# Patient Record
Sex: MALE | Race: WHITE | NOT HISPANIC OR LATINO | Employment: OTHER | URBAN - METROPOLITAN AREA
[De-identification: names, ages, dates, MRNs, and addresses within clinical notes are randomized per-mention and may not be internally consistent; named-entity substitution may affect disease eponyms.]

---

## 2017-03-31 ENCOUNTER — HISTORICAL (OUTPATIENT)
Dept: ADMINISTRATIVE | Facility: HOSPITAL | Age: 77
End: 2017-03-31

## 2017-06-07 ENCOUNTER — HISTORICAL (OUTPATIENT)
Dept: ADMINISTRATIVE | Facility: HOSPITAL | Age: 77
End: 2017-06-07

## 2017-06-07 LAB
ALBUMIN SERPL-MCNC: 3.3 GM/DL (ref 3.4–5)
APPEARANCE, UA: CLEAR
BACTERIA SPEC CULT: ABNORMAL /HPF
BILIRUB UR QL STRIP: NEGATIVE
BNP BLD-MCNC: 210 PG/ML (ref 0–125)
BUN SERPL-MCNC: 29 MG/DL (ref 7–18)
CALCIUM SERPL-MCNC: 9.3 MG/DL (ref 8.5–10.1)
CHLORIDE SERPL-SCNC: 107 MMOL/L (ref 98–107)
CO2 SERPL-SCNC: 24 MMOL/L (ref 21–32)
COLOR UR: YELLOW
CREAT SERPL-MCNC: 1.78 MG/DL (ref 0.7–1.3)
CREAT UR-MCNC: 152 MG/DL
ERYTHROCYTE [DISTWIDTH] IN BLOOD BY AUTOMATED COUNT: 13.7 % (ref 11.5–17)
GLUCOSE (UA): ABNORMAL
GLUCOSE SERPL-MCNC: 188 MG/DL (ref 74–106)
HCT VFR BLD AUTO: 38.2 % (ref 42–52)
HGB BLD-MCNC: 12.3 GM/DL (ref 14–18)
HGB UR QL STRIP: ABNORMAL
KETONES UR QL STRIP: NEGATIVE
LEUKOCYTE ESTERASE UR QL STRIP: NEGATIVE
MAGNESIUM SERPL-MCNC: 2.5 MG/DL (ref 1.8–2.4)
MCH RBC QN AUTO: 29.6 PG (ref 27–31)
MCHC RBC AUTO-ENTMCNC: 32.2 GM/DL (ref 33–36)
MCV RBC AUTO: 91.8 FL (ref 80–94)
NITRITE UR QL STRIP: NEGATIVE
PH UR STRIP: 5.5 [PH] (ref 5–9)
PHOSPHATE SERPL-MCNC: 3.2 MG/DL (ref 2.5–4.9)
PLATELET # BLD AUTO: 195 X10(3)/MCL (ref 130–400)
PMV BLD AUTO: 11 FL (ref 9.4–12.4)
POTASSIUM SERPL-SCNC: 4.5 MMOL/L (ref 3.5–5.1)
PROT UR QL STRIP: ABNORMAL
PROT UR STRIP-MCNC: 117.5 MG/DL
PROT/CREAT UR-RTO: 0.8 MG/DL
PTH-INTACT SERPL-MCNC: 31.6 PG/DL (ref 14–72)
RBC # BLD AUTO: 4.16 X10(6)/MCL (ref 4.7–6.1)
RBC #/AREA URNS HPF: ABNORMAL /[HPF]
SODIUM SERPL-SCNC: 144 MMOL/L (ref 136–145)
SP GR UR STRIP: 1.02 (ref 1–1.03)
SQUAMOUS EPITHELIAL, UA: ABNORMAL
URATE SERPL-MCNC: 4.3 MG/DL (ref 2.6–7.2)
UROBILINOGEN UR STRIP-ACNC: 0.2
WBC # SPEC AUTO: 7.9 X10(3)/MCL (ref 4.5–11.5)
WBC #/AREA URNS HPF: ABNORMAL /HPF

## 2017-06-29 ENCOUNTER — HISTORICAL (OUTPATIENT)
Dept: LAB | Facility: HOSPITAL | Age: 77
End: 2017-06-29

## 2017-06-29 LAB
ALBUMIN SERPL-MCNC: 3.1 GM/DL (ref 3.4–5)
ALBUMIN/GLOB SERPL: 0.9 RATIO (ref 1.1–2)
ALP SERPL-CCNC: 120 UNIT/L (ref 50–136)
ALT SERPL-CCNC: 27 UNIT/L (ref 12–78)
AST SERPL-CCNC: 13 UNIT/L (ref 15–37)
BILIRUB SERPL-MCNC: 0.3 MG/DL (ref 0.2–1)
BILIRUBIN DIRECT+TOT PNL SERPL-MCNC: 0.1 MG/DL (ref 0–0.5)
BILIRUBIN DIRECT+TOT PNL SERPL-MCNC: 0.2 MG/DL (ref 0–0.8)
BUN SERPL-MCNC: 34 MG/DL (ref 7–18)
CALCIUM SERPL-MCNC: 9 MG/DL (ref 8.5–10.1)
CHLORIDE SERPL-SCNC: 106 MMOL/L (ref 98–107)
CHOLEST SERPL-MCNC: 175 MG/DL (ref 0–200)
CHOLEST/HDLC SERPL: 5.1 {RATIO} (ref 0–5)
CO2 SERPL-SCNC: 26 MMOL/L (ref 21–32)
CREAT SERPL-MCNC: 1.96 MG/DL (ref 0.7–1.3)
EST. AVERAGE GLUCOSE BLD GHB EST-MCNC: 194 MG/DL
GLOBULIN SER-MCNC: 3.6 GM/DL (ref 2.4–3.5)
GLUCOSE SERPL-MCNC: 214 MG/DL (ref 74–106)
HBA1C MFR BLD: 8.4 % (ref 4.2–6.3)
HDLC SERPL-MCNC: 34 MG/DL (ref 35–60)
LDLC SERPL CALC-MCNC: 72 MG/DL (ref 0–129)
POTASSIUM SERPL-SCNC: 4.8 MMOL/L (ref 3.5–5.1)
PROT SERPL-MCNC: 6.7 GM/DL (ref 6.4–8.2)
SODIUM SERPL-SCNC: 141 MMOL/L (ref 136–145)
TRIGL SERPL-MCNC: 347 MG/DL (ref 30–150)
VLDLC SERPL CALC-MCNC: 69 MG/DL

## 2017-11-08 ENCOUNTER — HISTORICAL (OUTPATIENT)
Dept: ADMINISTRATIVE | Facility: HOSPITAL | Age: 77
End: 2017-11-08

## 2017-11-08 LAB
EST. AVERAGE GLUCOSE BLD GHB EST-MCNC: 157 MG/DL
HBA1C MFR BLD: 7.1 % (ref 4.2–6.3)

## 2017-11-30 ENCOUNTER — HISTORICAL (OUTPATIENT)
Dept: ADMINISTRATIVE | Facility: HOSPITAL | Age: 77
End: 2017-11-30

## 2017-11-30 LAB
ALBUMIN SERPL-MCNC: 3.4 GM/DL (ref 3.4–5)
ALBUMIN/GLOB SERPL: 1 RATIO (ref 1.1–2)
ALP SERPL-CCNC: 83 UNIT/L (ref 50–136)
ALT SERPL-CCNC: 42 UNIT/L (ref 12–78)
AST SERPL-CCNC: 16 UNIT/L (ref 15–37)
BILIRUB SERPL-MCNC: 0.5 MG/DL (ref 0.2–1)
BILIRUBIN DIRECT+TOT PNL SERPL-MCNC: 0.1 MG/DL (ref 0–0.5)
BILIRUBIN DIRECT+TOT PNL SERPL-MCNC: 0.4 MG/DL (ref 0–0.8)
BUN SERPL-MCNC: 28 MG/DL (ref 7–18)
CALCIUM SERPL-MCNC: 9.1 MG/DL (ref 8.5–10.1)
CHLORIDE SERPL-SCNC: 108 MMOL/L (ref 98–107)
CHOLEST SERPL-MCNC: 165 MG/DL (ref 0–200)
CHOLEST/HDLC SERPL: 4.2 {RATIO} (ref 0–5)
CO2 SERPL-SCNC: 26 MMOL/L (ref 21–32)
CREAT SERPL-MCNC: 1.75 MG/DL (ref 0.7–1.3)
GLOBULIN SER-MCNC: 3.4 GM/DL (ref 2.4–3.5)
GLUCOSE SERPL-MCNC: 156 MG/DL (ref 74–106)
HDLC SERPL-MCNC: 39 MG/DL (ref 35–60)
LDLC SERPL CALC-MCNC: 84 MG/DL (ref 0–129)
POTASSIUM SERPL-SCNC: 4.7 MMOL/L (ref 3.5–5.1)
PROT SERPL-MCNC: 6.8 GM/DL (ref 6.4–8.2)
SODIUM SERPL-SCNC: 142 MMOL/L (ref 136–145)
TRIGL SERPL-MCNC: 209 MG/DL (ref 30–150)
VLDLC SERPL CALC-MCNC: 42 MG/DL

## 2018-05-11 ENCOUNTER — HISTORICAL (OUTPATIENT)
Dept: ADMINISTRATIVE | Facility: HOSPITAL | Age: 78
End: 2018-05-11

## 2018-05-11 LAB
ABS NEUT (OLG): 4.92 X10(3)/MCL (ref 2.1–9.2)
ALBUMIN SERPL-MCNC: 3.1 GM/DL (ref 3.4–5)
ALBUMIN/GLOB SERPL: 0.9 {RATIO}
ALP SERPL-CCNC: 109 UNIT/L (ref 50–136)
ALT SERPL-CCNC: 30 UNIT/L (ref 12–78)
AST SERPL-CCNC: 10 UNIT/L (ref 15–37)
BASOPHILS # BLD AUTO: 0 X10(3)/MCL (ref 0–0.2)
BASOPHILS NFR BLD AUTO: 1 %
BILIRUB SERPL-MCNC: 0.3 MG/DL (ref 0.2–1)
BILIRUBIN DIRECT+TOT PNL SERPL-MCNC: 0.1 MG/DL (ref 0–0.2)
BILIRUBIN DIRECT+TOT PNL SERPL-MCNC: 0.2 MG/DL (ref 0–0.8)
BUN SERPL-MCNC: 29 MG/DL (ref 7–18)
CALCIUM SERPL-MCNC: 8.9 MG/DL (ref 8.5–10.1)
CHLORIDE SERPL-SCNC: 109 MMOL/L (ref 98–107)
CHOLEST SERPL-MCNC: 154 MG/DL (ref 0–200)
CHOLEST/HDLC SERPL: 3.4 {RATIO} (ref 0–5)
CO2 SERPL-SCNC: 26 MMOL/L (ref 21–32)
CREAT SERPL-MCNC: 1.6 MG/DL (ref 0.7–1.3)
EOSINOPHIL # BLD AUTO: 0.2 X10(3)/MCL (ref 0–0.9)
EOSINOPHIL NFR BLD AUTO: 2 %
ERYTHROCYTE [DISTWIDTH] IN BLOOD BY AUTOMATED COUNT: 14 % (ref 11.5–17)
EST. AVERAGE GLUCOSE BLD GHB EST-MCNC: 169 MG/DL
GLOBULIN SER-MCNC: 3.5 GM/DL (ref 2.4–3.5)
GLUCOSE SERPL-MCNC: 154 MG/DL (ref 74–106)
HBA1C MFR BLD: 7.5 % (ref 4.2–6.3)
HCT VFR BLD AUTO: 39.2 % (ref 42–52)
HDLC SERPL-MCNC: 45 MG/DL (ref 35–60)
HGB BLD-MCNC: 12.8 GM/DL (ref 14–18)
LDLC SERPL CALC-MCNC: 94 MG/DL (ref 0–129)
LYMPHOCYTES # BLD AUTO: 1.4 X10(3)/MCL (ref 0.6–4.6)
LYMPHOCYTES NFR BLD AUTO: 19 %
MCH RBC QN AUTO: 30.7 PG (ref 27–31)
MCHC RBC AUTO-ENTMCNC: 32.7 GM/DL (ref 33–36)
MCV RBC AUTO: 94 FL (ref 80–94)
MONOCYTES # BLD AUTO: 0.7 X10(3)/MCL (ref 0.1–1.3)
MONOCYTES NFR BLD AUTO: 10 %
NEUTROPHILS # BLD AUTO: 4.92 X10(3)/MCL (ref 1.4–7.9)
NEUTROPHILS NFR BLD AUTO: 67 %
PLATELET # BLD AUTO: 198 X10(3)/MCL (ref 130–400)
PMV BLD AUTO: 10.1 FL (ref 9.4–12.4)
POTASSIUM SERPL-SCNC: 4.2 MMOL/L (ref 3.5–5.1)
PROT SERPL-MCNC: 6.6 GM/DL (ref 6.4–8.2)
RBC # BLD AUTO: 4.17 X10(6)/MCL (ref 4.7–6.1)
SODIUM SERPL-SCNC: 143 MMOL/L (ref 136–145)
TRIGL SERPL-MCNC: 76 MG/DL (ref 30–150)
VLDLC SERPL CALC-MCNC: 15 MG/DL
WBC # SPEC AUTO: 7.3 X10(3)/MCL (ref 4.5–11.5)

## 2018-06-07 ENCOUNTER — HISTORICAL (OUTPATIENT)
Dept: ADMINISTRATIVE | Facility: HOSPITAL | Age: 78
End: 2018-06-07

## 2018-06-07 LAB
ALBUMIN SERPL-MCNC: 3.3 GM/DL (ref 3.4–5)
ALBUMIN SERPL-MCNC: 3.3 GM/DL (ref 3.4–5)
ALBUMIN/GLOB SERPL: 0.9 {RATIO}
ALP SERPL-CCNC: 89 UNIT/L (ref 50–136)
ALT SERPL-CCNC: 35 UNIT/L (ref 12–78)
APPEARANCE, UA: CLEAR
AST SERPL-CCNC: 13 UNIT/L (ref 15–37)
BACTERIA SPEC CULT: ABNORMAL /HPF
BILIRUB SERPL-MCNC: 0.4 MG/DL (ref 0.2–1)
BILIRUB UR QL STRIP: NEGATIVE
BILIRUBIN DIRECT+TOT PNL SERPL-MCNC: 0.1 MG/DL (ref 0–0.2)
BILIRUBIN DIRECT+TOT PNL SERPL-MCNC: 0.3 MG/DL (ref 0–0.8)
BUN SERPL-MCNC: 29 MG/DL (ref 7–18)
BUN SERPL-MCNC: 29 MG/DL (ref 7–18)
CALCIUM SERPL-MCNC: 8.8 MG/DL (ref 8.5–10.1)
CALCIUM SERPL-MCNC: 8.9 MG/DL (ref 8.5–10.1)
CHLORIDE SERPL-SCNC: 106 MMOL/L (ref 98–107)
CHLORIDE SERPL-SCNC: 106 MMOL/L (ref 98–107)
CHOLEST SERPL-MCNC: 178 MG/DL (ref 0–200)
CHOLEST/HDLC SERPL: 3.8 {RATIO} (ref 0–5)
CO2 SERPL-SCNC: 24 MMOL/L (ref 21–32)
CO2 SERPL-SCNC: 25 MMOL/L (ref 21–32)
COLOR UR: YELLOW
CREAT SERPL-MCNC: 1.64 MG/DL (ref 0.7–1.3)
CREAT SERPL-MCNC: 1.68 MG/DL (ref 0.7–1.3)
CREAT UR-MCNC: 86 MG/DL
ERYTHROCYTE [DISTWIDTH] IN BLOOD BY AUTOMATED COUNT: 13.5 % (ref 11.5–17)
EST. AVERAGE GLUCOSE BLD GHB EST-MCNC: 169 MG/DL
GLOBULIN SER-MCNC: 3.6 GM/DL (ref 2.4–3.5)
GLUCOSE (UA): ABNORMAL
GLUCOSE SERPL-MCNC: 180 MG/DL (ref 74–106)
GLUCOSE SERPL-MCNC: 180 MG/DL (ref 74–106)
HBA1C MFR BLD: 7.5 % (ref 4.2–6.3)
HCT VFR BLD AUTO: 41.8 % (ref 42–52)
HDLC SERPL-MCNC: 47 MG/DL (ref 35–60)
HGB BLD-MCNC: 13.3 GM/DL (ref 14–18)
HGB UR QL STRIP: NEGATIVE
KETONES UR QL STRIP: NEGATIVE
LDLC SERPL CALC-MCNC: 87 MG/DL (ref 0–129)
LEUKOCYTE ESTERASE UR QL STRIP: NEGATIVE
MCH RBC QN AUTO: 29.4 PG (ref 27–31)
MCHC RBC AUTO-ENTMCNC: 31.8 GM/DL (ref 33–36)
MCV RBC AUTO: 92.3 FL (ref 80–94)
NITRITE UR QL STRIP: NEGATIVE
PH UR STRIP: 5 [PH] (ref 5–9)
PHOSPHATE SERPL-MCNC: 3.1 MG/DL (ref 2.5–4.9)
PLATELET # BLD AUTO: 203 X10(3)/MCL (ref 130–400)
PMV BLD AUTO: 9.8 FL (ref 9.4–12.4)
POTASSIUM SERPL-SCNC: 4.8 MMOL/L (ref 3.5–5.1)
POTASSIUM SERPL-SCNC: 4.9 MMOL/L (ref 3.5–5.1)
PROT SERPL-MCNC: 6.9 GM/DL (ref 6.4–8.2)
PROT UR QL STRIP: ABNORMAL
PROT UR STRIP-MCNC: 67 MG/DL
PTH-INTACT SERPL-MCNC: 74.4 PG/ML (ref 18.4–80.1)
RBC # BLD AUTO: 4.53 X10(6)/MCL (ref 4.7–6.1)
RBC #/AREA URNS HPF: ABNORMAL /[HPF]
SODIUM SERPL-SCNC: 139 MMOL/L (ref 136–145)
SODIUM SERPL-SCNC: 140 MMOL/L (ref 136–145)
SP GR UR STRIP: 1.01 (ref 1–1.03)
SQUAMOUS EPITHELIAL, UA: ABNORMAL
TRIGL SERPL-MCNC: 222 MG/DL (ref 30–150)
TSH SERPL-ACNC: 1.39 MIU/L (ref 0.36–3.74)
URATE SERPL-MCNC: 4 MG/DL (ref 2.6–7.2)
UROBILINOGEN UR STRIP-ACNC: 0.2
VLDLC SERPL CALC-MCNC: 44 MG/DL
WBC # SPEC AUTO: 6 X10(3)/MCL (ref 4.5–11.5)
WBC #/AREA URNS HPF: ABNORMAL /HPF

## 2018-09-18 ENCOUNTER — HISTORICAL (OUTPATIENT)
Dept: ADMINISTRATIVE | Facility: HOSPITAL | Age: 78
End: 2018-09-18

## 2018-09-18 LAB
EST. AVERAGE GLUCOSE BLD GHB EST-MCNC: 220 MG/DL
HBA1C MFR BLD: 9.3 % (ref 4.2–6.3)

## 2018-12-06 ENCOUNTER — HISTORICAL (OUTPATIENT)
Dept: ADMINISTRATIVE | Facility: HOSPITAL | Age: 78
End: 2018-12-06

## 2018-12-06 LAB
BUN SERPL-MCNC: 32 MG/DL (ref 7–18)
CALCIUM SERPL-MCNC: 9.3 MG/DL (ref 8.5–10.1)
CHLORIDE SERPL-SCNC: 107 MMOL/L (ref 98–107)
CO2 SERPL-SCNC: 25 MMOL/L (ref 21–32)
CREAT SERPL-MCNC: 1.71 MG/DL (ref 0.7–1.3)
CREAT/UREA NIT SERPL: 18.7
EST. AVERAGE GLUCOSE BLD GHB EST-MCNC: 223 MG/DL
GLUCOSE SERPL-MCNC: 197 MG/DL (ref 74–106)
HBA1C MFR BLD: 9.4 % (ref 4.2–6.3)
POTASSIUM SERPL-SCNC: 4.4 MMOL/L (ref 3.5–5.1)
SODIUM SERPL-SCNC: 140 MMOL/L (ref 136–145)

## 2019-03-08 ENCOUNTER — HISTORICAL (OUTPATIENT)
Dept: ADMINISTRATIVE | Facility: HOSPITAL | Age: 79
End: 2019-03-08

## 2019-03-08 LAB
EST. AVERAGE GLUCOSE BLD GHB EST-MCNC: 169 MG/DL
HBA1C MFR BLD: 7.5 % (ref 4.2–6.3)

## 2019-03-12 ENCOUNTER — HISTORICAL (OUTPATIENT)
Dept: LAB | Facility: HOSPITAL | Age: 79
End: 2019-03-12

## 2019-03-12 LAB
CREAT UR-MCNC: 267 MG/DL
MICROALBUMIN UR-MCNC: 59.3 MG/DL
MICROALBUMIN/CREAT RATIO PNL UR: 222.1 MG/GM CR (ref 0–30)

## 2019-04-05 ENCOUNTER — HISTORICAL (OUTPATIENT)
Dept: ADMINISTRATIVE | Facility: HOSPITAL | Age: 79
End: 2019-04-05

## 2019-04-05 LAB
ALBUMIN SERPL-MCNC: 3.4 GM/DL (ref 3.4–5)
ALBUMIN/GLOB SERPL: 1 RATIO (ref 1.1–2)
ALP SERPL-CCNC: 85 UNIT/L (ref 50–136)
ALT SERPL-CCNC: 33 UNIT/L (ref 12–78)
AST SERPL-CCNC: 16 UNIT/L (ref 15–37)
BILIRUB SERPL-MCNC: 0.4 MG/DL (ref 0.2–1)
BILIRUBIN DIRECT+TOT PNL SERPL-MCNC: 0.1 MG/DL (ref 0–0.5)
BILIRUBIN DIRECT+TOT PNL SERPL-MCNC: 0.3 MG/DL (ref 0–0.8)
BUN SERPL-MCNC: 41 MG/DL (ref 7–18)
CALCIUM SERPL-MCNC: 9.3 MG/DL (ref 8.5–10.1)
CHLORIDE SERPL-SCNC: 111 MMOL/L (ref 98–107)
CHOLEST SERPL-MCNC: 137 MG/DL (ref 0–200)
CHOLEST/HDLC SERPL: 3.7 {RATIO} (ref 0–5)
CO2 SERPL-SCNC: 23 MMOL/L (ref 21–32)
CREAT SERPL-MCNC: 2.2 MG/DL (ref 0.7–1.3)
GLOBULIN SER-MCNC: 3.4 GM/DL (ref 2.4–3.5)
GLUCOSE SERPL-MCNC: 131 MG/DL (ref 74–106)
HDLC SERPL-MCNC: 37 MG/DL (ref 35–60)
LDLC SERPL CALC-MCNC: 75 MG/DL (ref 0–129)
POTASSIUM SERPL-SCNC: 4.5 MMOL/L (ref 3.5–5.1)
PROT SERPL-MCNC: 6.8 GM/DL (ref 6.4–8.2)
SODIUM SERPL-SCNC: 141 MMOL/L (ref 136–145)
TRIGL SERPL-MCNC: 127 MG/DL (ref 30–150)
VLDLC SERPL CALC-MCNC: 25 MG/DL

## 2019-05-15 ENCOUNTER — HISTORICAL (OUTPATIENT)
Dept: ADMINISTRATIVE | Facility: HOSPITAL | Age: 79
End: 2019-05-15

## 2019-05-15 LAB
ALBUMIN SERPL-MCNC: 3.1 GM/DL (ref 3.4–5)
BUN SERPL-MCNC: 27 MG/DL (ref 7–18)
CALCIUM SERPL-MCNC: 9 MG/DL (ref 8.5–10.1)
CHLORIDE SERPL-SCNC: 107 MMOL/L (ref 98–107)
CO2 SERPL-SCNC: 26 MMOL/L (ref 21–32)
CREAT SERPL-MCNC: 1.92 MG/DL (ref 0.7–1.3)
GLUCOSE SERPL-MCNC: 244 MG/DL (ref 74–106)
PHOSPHATE SERPL-MCNC: 3.5 MG/DL (ref 2.5–4.9)
POTASSIUM SERPL-SCNC: 4.4 MMOL/L (ref 3.5–5.1)
SODIUM SERPL-SCNC: 139 MMOL/L (ref 136–145)

## 2019-06-25 ENCOUNTER — HISTORICAL (OUTPATIENT)
Dept: LAB | Facility: HOSPITAL | Age: 79
End: 2019-06-25

## 2019-06-25 LAB
BUN SERPL-MCNC: 30 MG/DL (ref 7–18)
CALCIUM SERPL-MCNC: 9.4 MG/DL (ref 8.5–10.1)
CHLORIDE SERPL-SCNC: 112 MMOL/L (ref 98–107)
CO2 SERPL-SCNC: 25 MMOL/L (ref 21–32)
CREAT SERPL-MCNC: 1.97 MG/DL (ref 0.7–1.3)
CREAT/UREA NIT SERPL: 15
GLUCOSE SERPL-MCNC: 112 MG/DL (ref 74–106)
POTASSIUM SERPL-SCNC: 4.5 MMOL/L (ref 3.5–5.1)
SODIUM SERPL-SCNC: 144 MMOL/L (ref 136–145)

## 2019-08-10 ENCOUNTER — HISTORICAL (OUTPATIENT)
Dept: LAB | Facility: HOSPITAL | Age: 79
End: 2019-08-10

## 2019-08-10 LAB
CHOLEST SERPL-MCNC: 130 MG/DL (ref 0–199)
CHOLEST/HDLC SERPL: 3 MG/DL (ref 0–8)
HDLC SERPL-MCNC: 42 MG/DL
LDLC SERPL CALC-MCNC: 65 MG/DL (ref 0–129)
TRIGL SERPL-MCNC: 114 MG/DL (ref 0–149)
VLDLC SERPL CALC-MCNC: 23 MG/DL

## 2019-08-14 ENCOUNTER — HISTORICAL (OUTPATIENT)
Dept: LAB | Facility: HOSPITAL | Age: 79
End: 2019-08-14

## 2019-08-14 LAB
ABS NEUT (OLG): 4.47 X10(3)/MCL (ref 2.1–9.2)
BASOPHILS # BLD AUTO: 0.1 X10(3)/MCL (ref 0–0.2)
BASOPHILS NFR BLD AUTO: 1 %
EOSINOPHIL # BLD AUTO: 0.2 X10(3)/MCL (ref 0–0.9)
EOSINOPHIL NFR BLD AUTO: 3 %
ERYTHROCYTE [DISTWIDTH] IN BLOOD BY AUTOMATED COUNT: 14.4 % (ref 11.5–17)
EST. AVERAGE GLUCOSE BLD GHB EST-MCNC: 134 MG/DL
HBA1C MFR BLD: 6.3 % (ref 4.2–6.3)
HCT VFR BLD AUTO: 41.8 % (ref 42–52)
HGB BLD-MCNC: 13.2 GM/DL (ref 14–18)
LYMPHOCYTES # BLD AUTO: 1.6 X10(3)/MCL (ref 0.6–4.6)
LYMPHOCYTES NFR BLD AUTO: 22 %
MCH RBC QN AUTO: 29.8 PG (ref 27–31)
MCHC RBC AUTO-ENTMCNC: 31.6 GM/DL (ref 33–36)
MCV RBC AUTO: 94.4 FL (ref 80–94)
MONOCYTES # BLD AUTO: 0.7 X10(3)/MCL (ref 0.1–1.3)
MONOCYTES NFR BLD AUTO: 10 %
NEUTROPHILS # BLD AUTO: 4.47 X10(3)/MCL (ref 2.1–9.2)
NEUTROPHILS NFR BLD AUTO: 63 %
PLATELET # BLD AUTO: 213 X10(3)/MCL (ref 130–400)
PMV BLD AUTO: 11 FL (ref 9.4–12.4)
RBC # BLD AUTO: 4.43 X10(6)/MCL (ref 4.7–6.1)
WBC # SPEC AUTO: 7.1 X10(3)/MCL (ref 4.5–11.5)

## 2019-11-04 LAB
LEFT EYE DM RETINOPATHY: NEGATIVE
RIGHT EYE DM RETINOPATHY: NEGATIVE

## 2019-12-17 ENCOUNTER — HISTORICAL (OUTPATIENT)
Dept: ADMINISTRATIVE | Facility: HOSPITAL | Age: 79
End: 2019-12-17

## 2019-12-17 LAB
ALBUMIN SERPL-MCNC: 3.2 GM/DL (ref 3.4–5)
ALBUMIN/GLOB SERPL: 0.9 RATIO (ref 1.1–2)
ALP SERPL-CCNC: 83 UNIT/L (ref 50–136)
ALT SERPL-CCNC: 51 UNIT/L (ref 12–78)
AST SERPL-CCNC: 19 UNIT/L (ref 15–37)
BILIRUB SERPL-MCNC: 0.5 MG/DL (ref 0.2–1)
BILIRUBIN DIRECT+TOT PNL SERPL-MCNC: 0.1 MG/DL (ref 0–0.5)
BILIRUBIN DIRECT+TOT PNL SERPL-MCNC: 0.4 MG/DL (ref 0–0.8)
BUN SERPL-MCNC: 24 MG/DL (ref 7–18)
CALCIUM SERPL-MCNC: 9.6 MG/DL (ref 8.5–10.1)
CHLORIDE SERPL-SCNC: 107 MMOL/L (ref 98–107)
CO2 SERPL-SCNC: 28 MMOL/L (ref 21–32)
CREAT SERPL-MCNC: 1.69 MG/DL (ref 0.7–1.3)
EST. AVERAGE GLUCOSE BLD GHB EST-MCNC: 166 MG/DL
GLOBULIN SER-MCNC: 3.5 GM/DL (ref 2.4–3.5)
GLUCOSE SERPL-MCNC: 122 MG/DL (ref 74–106)
HBA1C MFR BLD: 7.4 % (ref 4.2–6.3)
POTASSIUM SERPL-SCNC: 4.8 MMOL/L (ref 3.5–5.1)
PROT SERPL-MCNC: 6.7 GM/DL (ref 6.4–8.2)
SODIUM SERPL-SCNC: 141 MMOL/L (ref 136–145)

## 2020-06-05 ENCOUNTER — HISTORICAL (OUTPATIENT)
Dept: LAB | Facility: HOSPITAL | Age: 80
End: 2020-06-05

## 2020-06-05 LAB
ABS NEUT (OLG): 5.92 X10(3)/MCL (ref 2.1–9.2)
ALBUMIN SERPL-MCNC: 3.6 GM/DL (ref 3.4–4.8)
ALBUMIN/GLOB SERPL: 1.1 RATIO (ref 1.1–2)
ALP SERPL-CCNC: 73 UNIT/L (ref 40–150)
ALT SERPL-CCNC: 22 UNIT/L (ref 0–55)
APPEARANCE, UA: ABNORMAL
AST SERPL-CCNC: 13 UNIT/L (ref 5–34)
BACTERIA SPEC CULT: ABNORMAL /HPF
BILIRUB SERPL-MCNC: 0.8 MG/DL (ref 0.2–1.2)
BILIRUB UR QL STRIP: NEGATIVE
BILIRUBIN DIRECT+TOT PNL SERPL-MCNC: 0.3 MG/DL (ref 0–0.5)
BILIRUBIN DIRECT+TOT PNL SERPL-MCNC: 0.5 MG/DL (ref 0–0.8)
BUN SERPL-MCNC: 30 MG/DL (ref 8.4–25.7)
CALCIUM SERPL-MCNC: 9.9 MG/DL (ref 8.8–10)
CHLORIDE SERPL-SCNC: 108 MMOL/L (ref 98–107)
CHOLEST SERPL-MCNC: 136 MG/DL
CHOLEST/HDLC SERPL: 4 {RATIO} (ref 0–5)
CO2 SERPL-SCNC: 22 MMOL/L (ref 23–31)
COLOR UR: YELLOW
CREAT SERPL-MCNC: 2.07 MG/DL (ref 0.72–1.25)
CREAT UR-MCNC: 386.6 MG/DL (ref 58–161)
ERYTHROCYTE [DISTWIDTH] IN BLOOD BY AUTOMATED COUNT: 14.5 % (ref 11.5–17)
EST. AVERAGE GLUCOSE BLD GHB EST-MCNC: 137 MG/DL
GLOBULIN SER-MCNC: 3.2 GM/DL (ref 2.4–3.5)
GLUCOSE (UA): NEGATIVE
GLUCOSE SERPL-MCNC: 118 MG/DL (ref 82–115)
HBA1C MFR BLD: 6.4 %
HCT VFR BLD AUTO: 39.2 % (ref 42–52)
HDLC SERPL-MCNC: 34 MG/DL (ref 40–60)
HGB BLD-MCNC: 12.8 GM/DL (ref 14–18)
HGB UR QL STRIP: NEGATIVE
HYALINE CASTS #/AREA URNS LPF: ABNORMAL /LPF
KETONES UR QL STRIP: ABNORMAL
LDLC SERPL CALC-MCNC: 76 MG/DL (ref 50–140)
LEUKOCYTE ESTERASE UR QL STRIP: NEGATIVE
MAGNESIUM SERPL-MCNC: 1.77 MG/DL (ref 1.6–2.6)
MCH RBC QN AUTO: 30.1 PG (ref 27–31)
MCHC RBC AUTO-ENTMCNC: 32.7 GM/DL (ref 33–36)
MCV RBC AUTO: 92.2 FL (ref 80–94)
NITRITE UR QL STRIP: NEGATIVE
NRBC BLD AUTO-RTO: 0 % (ref 0–0.2)
PH UR STRIP: 5 [PH] (ref 5–7)
PHOSPHATE SERPL-MCNC: 3.3 MG/DL (ref 2.3–4.7)
PLATELET # BLD AUTO: 220 X10(3)/MCL (ref 130–400)
PMV BLD AUTO: 10.4 FL (ref 7.4–10.4)
POTASSIUM SERPL-SCNC: 3.8 MMOL/L (ref 3.5–5.1)
PROT SERPL-MCNC: 6.8 GM/DL (ref 5.8–7.6)
PROT UR QL STRIP: ABNORMAL
PROT UR STRIP-MCNC: 173 MG/DL
PTH-INTACT SERPL-MCNC: 89.7 PG/ML (ref 8.7–77.1)
RBC # BLD AUTO: 4.25 X10(6)/MCL (ref 4.7–6.1)
RBC #/AREA URNS HPF: 0 /[HPF]
SODIUM SERPL-SCNC: 140 MMOL/L (ref 136–145)
SP GR UR STRIP: >=1.03 (ref 1–1.03)
SQUAMOUS EPITHELIAL, UA: ABNORMAL /LPF
TRIGL SERPL-MCNC: 132 MG/DL (ref 0–150)
TSH SERPL-ACNC: 1.81 UIU/ML (ref 0.35–4.94)
URATE SERPL-MCNC: 5 MG/DL (ref 3.5–7.2)
UROBILINOGEN UR STRIP-ACNC: NEGATIVE
VLDLC SERPL CALC-MCNC: 26 MG/DL
WBC # SPEC AUTO: 9.1 X10(3)/MCL (ref 4.5–11.5)
WBC #/AREA URNS HPF: ABNORMAL /HPF

## 2020-07-01 ENCOUNTER — HISTORICAL (OUTPATIENT)
Dept: ADMINISTRATIVE | Facility: HOSPITAL | Age: 80
End: 2020-07-01

## 2020-07-01 LAB
ALBUMIN SERPL-MCNC: 3.5 GM/DL (ref 3.4–5)
ALBUMIN/GLOB SERPL: 1.2 RATIO (ref 1.1–2)
ALP SERPL-CCNC: 76 UNIT/L (ref 40–150)
ALT SERPL-CCNC: 26 UNIT/L (ref 0–55)
AST SERPL-CCNC: 14 UNIT/L (ref 5–34)
BILIRUB SERPL-MCNC: 0.6 MG/DL
BILIRUBIN DIRECT+TOT PNL SERPL-MCNC: 0.2 MG/DL (ref 0–0.5)
BILIRUBIN DIRECT+TOT PNL SERPL-MCNC: 0.4 MG/DL (ref 0–0.8)
BUN SERPL-MCNC: 26.1 MG/DL (ref 8.4–25.7)
CALCIUM SERPL-MCNC: 9 MG/DL (ref 8.8–10)
CHLORIDE SERPL-SCNC: 108 MMOL/L (ref 98–107)
CHOLEST SERPL-MCNC: 128 MG/DL
CHOLEST/HDLC SERPL: 4 {RATIO} (ref 0–5)
CO2 SERPL-SCNC: 26 MMOL/L (ref 23–31)
CREAT SERPL-MCNC: 1.69 MG/DL (ref 0.73–1.18)
GLOBULIN SER-MCNC: 2.8 GM/DL (ref 2.4–3.5)
GLUCOSE SERPL-MCNC: 93 MG/DL (ref 82–115)
HDLC SERPL-MCNC: 32 MG/DL (ref 35–60)
LDLC SERPL CALC-MCNC: 76 MG/DL (ref 50–140)
POTASSIUM SERPL-SCNC: 4.2 MMOL/L (ref 3.5–5.1)
PROT SERPL-MCNC: 6.3 GM/DL (ref 5.8–7.6)
SODIUM SERPL-SCNC: 142 MMOL/L (ref 136–145)
TRIGL SERPL-MCNC: 98 MG/DL (ref 34–140)
VLDLC SERPL CALC-MCNC: 20 MG/DL

## 2020-07-13 ENCOUNTER — HISTORICAL (OUTPATIENT)
Dept: ADMINISTRATIVE | Facility: HOSPITAL | Age: 80
End: 2020-07-13

## 2020-07-13 LAB
EST. AVERAGE GLUCOSE BLD GHB EST-MCNC: 142.7 MG/DL
HBA1C MFR BLD: 6.6 %

## 2020-11-16 ENCOUNTER — HISTORICAL (OUTPATIENT)
Dept: LAB | Facility: HOSPITAL | Age: 80
End: 2020-11-16

## 2020-11-16 LAB
ALBUMIN SERPL-MCNC: 3.5 GM/DL (ref 3.4–4.8)
ALBUMIN/GLOB SERPL: 1 RATIO (ref 1.1–2)
ALP SERPL-CCNC: 90 UNIT/L (ref 40–150)
ALT SERPL-CCNC: 44 UNIT/L (ref 0–55)
AST SERPL-CCNC: 23 UNIT/L (ref 5–34)
BILIRUB SERPL-MCNC: 0.3 MG/DL (ref 0.2–1.2)
BILIRUBIN DIRECT+TOT PNL SERPL-MCNC: 0.1 MG/DL (ref 0–0.5)
BILIRUBIN DIRECT+TOT PNL SERPL-MCNC: 0.2 MG/DL (ref 0–0.8)
BUN SERPL-MCNC: 38 MG/DL (ref 8.4–25.7)
CALCIUM SERPL-MCNC: 9.8 MG/DL (ref 8.8–10)
CHLORIDE SERPL-SCNC: 109 MMOL/L (ref 98–107)
CHOLEST SERPL-MCNC: 113 MG/DL
CHOLEST/HDLC SERPL: 3 {RATIO} (ref 0–5)
CO2 SERPL-SCNC: 24 MMOL/L (ref 23–31)
CREAT SERPL-MCNC: 2.16 MG/DL (ref 0.72–1.25)
EST. AVERAGE GLUCOSE BLD GHB EST-MCNC: 142.7 MG/DL
GLOBULIN SER-MCNC: 3.5 GM/DL (ref 2.4–3.5)
GLUCOSE SERPL-MCNC: 115 MG/DL (ref 82–115)
HBA1C MFR BLD: 6.6 %
HDLC SERPL-MCNC: 38 MG/DL (ref 40–60)
LDLC SERPL CALC-MCNC: 58 MG/DL (ref 50–140)
POTASSIUM SERPL-SCNC: 4.5 MMOL/L (ref 3.5–5.1)
PROT SERPL-MCNC: 7 GM/DL (ref 5.8–7.6)
SODIUM SERPL-SCNC: 142 MMOL/L (ref 136–145)
TRIGL SERPL-MCNC: 83 MG/DL (ref 0–150)
VLDLC SERPL CALC-MCNC: 17 MG/DL

## 2021-02-01 ENCOUNTER — HISTORICAL (OUTPATIENT)
Dept: LAB | Facility: HOSPITAL | Age: 81
End: 2021-02-01

## 2021-02-01 LAB
ALBUMIN SERPL-MCNC: 3.2 GM/DL (ref 3.4–4.8)
ALBUMIN/GLOB SERPL: 0.9 RATIO (ref 1.1–2)
ALP SERPL-CCNC: 75 UNIT/L (ref 40–150)
ALT SERPL-CCNC: 31 UNIT/L (ref 0–55)
AST SERPL-CCNC: 16 UNIT/L (ref 5–34)
BILIRUB SERPL-MCNC: 0.5 MG/DL (ref 0.2–1.2)
BILIRUBIN DIRECT+TOT PNL SERPL-MCNC: 0.2 MG/DL (ref 0–0.5)
BILIRUBIN DIRECT+TOT PNL SERPL-MCNC: 0.3 MG/DL (ref 0–0.8)
BUN SERPL-MCNC: 27.6 MG/DL (ref 8.4–25.7)
CALCIUM SERPL-MCNC: 9.1 MG/DL (ref 8.8–10)
CHLORIDE SERPL-SCNC: 110 MMOL/L (ref 98–107)
CHOLEST SERPL-MCNC: 119 MG/DL
CHOLEST/HDLC SERPL: 4 {RATIO} (ref 0–5)
CO2 SERPL-SCNC: 25 MMOL/L (ref 23–31)
CREAT SERPL-MCNC: 1.52 MG/DL (ref 0.72–1.25)
GLOBULIN SER-MCNC: 3.5 GM/DL (ref 2.4–3.5)
GLUCOSE SERPL-MCNC: 121 MG/DL (ref 82–115)
HDLC SERPL-MCNC: 34 MG/DL (ref 40–60)
LDLC SERPL CALC-MCNC: 62 MG/DL (ref 50–140)
POTASSIUM SERPL-SCNC: 4.4 MMOL/L (ref 3.5–5.1)
PROT SERPL-MCNC: 6.7 GM/DL (ref 5.8–7.6)
SODIUM SERPL-SCNC: 142 MMOL/L (ref 136–145)
TRIGL SERPL-MCNC: 114 MG/DL (ref 0–150)
VLDLC SERPL CALC-MCNC: 23 MG/DL

## 2021-02-12 ENCOUNTER — HISTORICAL (OUTPATIENT)
Dept: LAB | Facility: HOSPITAL | Age: 81
End: 2021-02-12

## 2021-02-12 LAB
BUN SERPL-MCNC: 25.6 MG/DL (ref 8.4–25.7)
CALCIUM SERPL-MCNC: 9.3 MG/DL (ref 8.8–10)
CHLORIDE SERPL-SCNC: 106 MMOL/L (ref 98–107)
CO2 SERPL-SCNC: 25 MMOL/L (ref 23–31)
CREAT SERPL-MCNC: 1.56 MG/DL (ref 0.72–1.25)
CREAT/UREA NIT SERPL: 16
EST. AVERAGE GLUCOSE BLD GHB EST-MCNC: 139.8 MG/DL
GLUCOSE SERPL-MCNC: 135 MG/DL (ref 82–115)
HBA1C MFR BLD: 6.5 %
POTASSIUM SERPL-SCNC: 4.2 MMOL/L (ref 3.5–5.1)
SODIUM SERPL-SCNC: 140 MMOL/L (ref 136–145)

## 2021-06-04 ENCOUNTER — HISTORICAL (OUTPATIENT)
Dept: LAB | Facility: HOSPITAL | Age: 81
End: 2021-06-04

## 2021-06-04 LAB
ABS NEUT (OLG): 5.66 X10(3)/MCL (ref 2.1–9.2)
ALBUMIN SERPL-MCNC: 3.4 GM/DL (ref 3.4–4.8)
ALBUMIN/GLOB SERPL: 0.9 RATIO (ref 1.1–2)
ALP SERPL-CCNC: 69 UNIT/L (ref 40–150)
ALT SERPL-CCNC: 22 UNIT/L (ref 0–55)
APPEARANCE, UA: CLEAR
AST SERPL-CCNC: 14 UNIT/L (ref 5–34)
BACTERIA SPEC CULT: ABNORMAL
BILIRUB SERPL-MCNC: 0.7 MG/DL (ref 0.2–1.2)
BILIRUB UR QL STRIP: NEGATIVE
BILIRUBIN DIRECT+TOT PNL SERPL-MCNC: 0.3 MG/DL (ref 0–0.5)
BILIRUBIN DIRECT+TOT PNL SERPL-MCNC: 0.4 MG/DL (ref 0–0.8)
BUN SERPL-MCNC: 41 MG/DL (ref 8.4–25.7)
CALCIUM SERPL-MCNC: 9.7 MG/DL (ref 8.8–10)
CHLORIDE SERPL-SCNC: 110 MMOL/L (ref 98–107)
CHOLEST SERPL-MCNC: 109 MG/DL
CHOLEST/HDLC SERPL: 3 {RATIO} (ref 0–5)
CO2 SERPL-SCNC: 23 MMOL/L (ref 23–31)
COLOR UR: YELLOW
CREAT SERPL-MCNC: 2.37 MG/DL (ref 0.72–1.25)
CREAT UR-MCNC: 147.3 MG/DL (ref 58–161)
DEPRECATED CALCIDIOL+CALCIFEROL SERPL-MC: 34.7 NG/ML (ref 30–80)
ERYTHROCYTE [DISTWIDTH] IN BLOOD BY AUTOMATED COUNT: 14.2 % (ref 11.5–17)
EST. AVERAGE GLUCOSE BLD GHB EST-MCNC: 139.8 MG/DL
GLOBULIN SER-MCNC: 3.6 GM/DL (ref 2.4–3.5)
GLUCOSE (UA): NEGATIVE
GLUCOSE SERPL-MCNC: 113 MG/DL (ref 82–115)
HBA1C MFR BLD: 6.5 %
HCT VFR BLD AUTO: 41 % (ref 42–52)
HDLC SERPL-MCNC: 33 MG/DL (ref 40–60)
HGB BLD-MCNC: 13.2 GM/DL (ref 14–18)
HGB UR QL STRIP: NEGATIVE
HYALINE CASTS #/AREA URNS LPF: ABNORMAL /LPF
KETONES UR QL STRIP: NEGATIVE
LDLC SERPL CALC-MCNC: 57 MG/DL (ref 50–140)
LEUKOCYTE ESTERASE UR QL STRIP: NEGATIVE
MAGNESIUM SERPL-MCNC: 1.86 MG/DL (ref 1.6–2.6)
MCH RBC QN AUTO: 29.9 PG (ref 27–31)
MCHC RBC AUTO-ENTMCNC: 32.2 GM/DL (ref 33–36)
MCV RBC AUTO: 93 FL (ref 80–94)
MUCOUS THREADS URNS QL MICRO: ABNORMAL /LPF
NITRITE UR QL STRIP: NEGATIVE
NRBC BLD AUTO-RTO: 0 % (ref 0–0.2)
PH UR STRIP: 5.5 [PH] (ref 5–7)
PHOSPHATE SERPL-MCNC: 3.1 MG/DL (ref 2.3–4.7)
PLATELET # BLD AUTO: 209 X10(3)/MCL (ref 130–400)
PMV BLD AUTO: 9.9 FL (ref 7.4–10.4)
POTASSIUM SERPL-SCNC: 4.6 MMOL/L (ref 3.5–5.1)
PROT SERPL-MCNC: 7 GM/DL (ref 5.8–7.6)
PROT UR QL STRIP: ABNORMAL
PROT UR STRIP-MCNC: 43.4 MG/DL
PROT/CREAT UR-RTO: 294.6 MG/GM CR
PTH-INTACT SERPL-MCNC: 75.5 PG/ML (ref 8.7–77.1)
RBC # BLD AUTO: 4.41 X10(6)/MCL (ref 4.7–6.1)
RBC #/AREA URNS HPF: 0 /[HPF]
SODIUM SERPL-SCNC: 142 MMOL/L (ref 136–145)
SP GR UR STRIP: 1.02 (ref 1–1.03)
SQUAMOUS EPITHELIAL, UA: ABNORMAL /LPF
TRIGL SERPL-MCNC: 96 MG/DL (ref 0–150)
TSH SERPL-ACNC: 1.36 UIU/ML (ref 0.35–4.94)
URATE SERPL-MCNC: 5.9 MG/DL (ref 3.5–7.2)
UROBILINOGEN UR STRIP-ACNC: NEGATIVE
VLDLC SERPL CALC-MCNC: 19 MG/DL
WBC # SPEC AUTO: 8.4 X10(3)/MCL (ref 4.5–11.5)
WBC #/AREA URNS HPF: 0 /[HPF]

## 2021-07-08 ENCOUNTER — HISTORICAL (OUTPATIENT)
Dept: LAB | Facility: HOSPITAL | Age: 81
End: 2021-07-08

## 2021-07-08 LAB
ALBUMIN SERPL-MCNC: 3.5 GM/DL (ref 3.4–4.8)
BUN SERPL-MCNC: 42.6 MG/DL (ref 8.4–25.7)
CALCIUM SERPL-MCNC: 10.1 MG/DL (ref 8.8–10)
CHLORIDE SERPL-SCNC: 109 MMOL/L (ref 98–107)
CO2 SERPL-SCNC: 21 MMOL/L (ref 23–31)
CREAT SERPL-MCNC: 1.95 MG/DL (ref 0.72–1.25)
GLUCOSE SERPL-MCNC: 93 MG/DL (ref 82–115)
PHOSPHATE SERPL-MCNC: 3.7 MG/DL (ref 2.3–4.7)
POTASSIUM SERPL-SCNC: 4.3 MMOL/L (ref 3.5–5.1)
SODIUM SERPL-SCNC: 140 MMOL/L (ref 136–145)

## 2021-10-12 ENCOUNTER — HISTORICAL (OUTPATIENT)
Dept: LAB | Facility: HOSPITAL | Age: 81
End: 2021-10-12

## 2021-10-12 LAB
BUN SERPL-MCNC: 32.8 MG/DL (ref 8.4–25.7)
CALCIUM SERPL-MCNC: 10.1 MG/DL (ref 8.8–10)
CHLORIDE SERPL-SCNC: 109 MMOL/L (ref 98–107)
CO2 SERPL-SCNC: 24 MMOL/L (ref 23–31)
CREAT SERPL-MCNC: 1.6 MG/DL (ref 0.72–1.25)
CREAT/UREA NIT SERPL: 20
EST. AVERAGE GLUCOSE BLD GHB EST-MCNC: 122.6 MG/DL
GLUCOSE SERPL-MCNC: 99 MG/DL (ref 82–115)
HBA1C MFR BLD: 5.9 %
POTASSIUM SERPL-SCNC: 4.6 MMOL/L (ref 3.5–5.1)
SODIUM SERPL-SCNC: 143 MMOL/L (ref 136–145)

## 2022-03-19 ENCOUNTER — HISTORICAL (OUTPATIENT)
Dept: LAB | Facility: HOSPITAL | Age: 82
End: 2022-03-19

## 2022-03-19 LAB
ALBUMIN SERPL-MCNC: 3.2 G/DL (ref 3.4–4.8)
ALBUMIN/GLOB SERPL: 0.9 {RATIO} (ref 1.1–2)
ALP SERPL-CCNC: 82 U/L (ref 40–150)
ALT SERPL-CCNC: 34 U/L (ref 0–55)
AST SERPL-CCNC: 18 U/L (ref 5–34)
BILIRUB SERPL-MCNC: 0.5 MG/DL (ref 0.2–1.2)
BILIRUBIN DIRECT+TOT PNL SERPL-MCNC: 0.2 (ref 0–0.5)
BILIRUBIN DIRECT+TOT PNL SERPL-MCNC: 0.3 (ref 0–0.8)
BUN SERPL-MCNC: 37.3 MG/DL (ref 8.4–25.7)
CALCIUM SERPL-MCNC: 9.3 MG/DL (ref 8.8–10)
CHLORIDE SERPL-SCNC: 109 MMOL/L (ref 98–107)
CHOLEST SERPL-MCNC: 106 MG/DL
CHOLEST/HDLC SERPL: 3 {RATIO} (ref 0–5)
CO2 SERPL-SCNC: 24 MMOL/L (ref 23–31)
CREAT SERPL-MCNC: 1.55 MG/DL (ref 0.72–1.25)
GLOBULIN SER-MCNC: 3.6 G/DL (ref 2.4–3.5)
GLUCOSE SERPL-MCNC: 144 MG/DL (ref 82–115)
HDLC SERPL-MCNC: 33 MG/DL (ref 40–60)
HEMOLYSIS INTERF INDEX SERPL-ACNC: 3
ICTERIC INTERF INDEX SERPL-ACNC: 0
LDLC SERPL CALC-MCNC: 56 MG/DL (ref 50–140)
LIPEMIC INTERF INDEX SERPL-ACNC: -2
POTASSIUM SERPL-SCNC: 4.2 MMOL/L (ref 3.5–5.1)
PROT SERPL-MCNC: 6.8 G/DL (ref 5.8–7.6)
SODIUM SERPL-SCNC: 142 MMOL/L (ref 136–145)
TRIGL SERPL-MCNC: 86 MG/DL (ref 0–150)
VLDLC SERPL CALC-MCNC: 17 MG/DL

## 2022-04-05 ENCOUNTER — HISTORICAL (OUTPATIENT)
Dept: LAB | Facility: HOSPITAL | Age: 82
End: 2022-04-05

## 2022-04-05 LAB
ALBUMIN SERPL-MCNC: 3.4 G/DL (ref 3.4–4.8)
ALBUMIN/GLOB SERPL: 0.9 {RATIO} (ref 1.1–2)
ALP SERPL-CCNC: 88 U/L (ref 40–150)
ALT SERPL-CCNC: 64 U/L (ref 0–55)
AST SERPL-CCNC: 19 U/L (ref 5–34)
BILIRUB SERPL-MCNC: 0.5 MG/DL (ref 0.2–1.2)
BILIRUBIN DIRECT+TOT PNL SERPL-MCNC: 0.2 (ref 0–0.5)
BILIRUBIN DIRECT+TOT PNL SERPL-MCNC: 0.3 (ref 0–0.8)
BUN SERPL-MCNC: 61.2 MG/DL (ref 8.4–25.7)
CALCIUM SERPL-MCNC: 9.5 MG/DL (ref 8.8–10)
CHLORIDE SERPL-SCNC: 111 MMOL/L (ref 98–107)
CHOLEST SERPL-MCNC: 108 MG/DL
CHOLEST/HDLC SERPL: 3 {RATIO} (ref 0–5)
CO2 SERPL-SCNC: 21 MMOL/L (ref 23–31)
CREAT SERPL-MCNC: 1.78 MG/DL (ref 0.72–1.25)
EST. AVERAGE GLUCOSE BLD GHB EST-MCNC: 159.9 MG/DL
GLOBULIN SER-MCNC: 3.6 G/DL (ref 2.4–3.5)
GLUCOSE SERPL-MCNC: 130 MG/DL (ref 82–115)
HBA1C MFR BLD: 7.2 %
HDLC SERPL-MCNC: 35 MG/DL (ref 40–60)
HEMOLYSIS INTERF INDEX SERPL-ACNC: 6
ICTERIC INTERF INDEX SERPL-ACNC: 0
LDLC SERPL CALC-MCNC: 54 MG/DL (ref 50–140)
LIPEMIC INTERF INDEX SERPL-ACNC: -2
POTASSIUM SERPL-SCNC: 4.9 MMOL/L (ref 3.5–5.1)
PROT SERPL-MCNC: 7 G/DL (ref 5.8–7.6)
SODIUM SERPL-SCNC: 141 MMOL/L (ref 136–145)
TRIGL SERPL-MCNC: 93 MG/DL (ref 0–150)
VLDLC SERPL CALC-MCNC: 19 MG/DL

## 2022-04-11 ENCOUNTER — HISTORICAL (OUTPATIENT)
Dept: ADMINISTRATIVE | Facility: HOSPITAL | Age: 82
End: 2022-04-11

## 2022-04-19 ENCOUNTER — HISTORICAL (OUTPATIENT)
Dept: LAB | Facility: HOSPITAL | Age: 82
End: 2022-04-19
Payer: MEDICARE

## 2022-04-19 LAB
ABS NEUT (OLG): 4.89 (ref 2.1–9.2)
ALBUMIN SERPL-MCNC: 3.4 G/DL (ref 3.4–4.8)
ALBUMIN SERPL-MCNC: 3.4 G/DL (ref 3.4–4.8)
ALBUMIN/GLOB SERPL: 0.9 {RATIO} (ref 1.1–2)
ALP SERPL-CCNC: 87 U/L (ref 40–150)
ALP SERPL-CCNC: 88 U/L (ref 40–150)
ALT SERPL-CCNC: 49 U/L (ref 0–55)
ALT SERPL-CCNC: 52 U/L (ref 0–55)
AST SERPL-CCNC: 20 U/L (ref 5–34)
AST SERPL-CCNC: 22 U/L (ref 5–34)
BILIRUB SERPL-MCNC: 0.5 MG/DL (ref 0.2–1.2)
BILIRUB SERPL-MCNC: 0.5 MG/DL (ref 0.2–1.2)
BILIRUBIN DIRECT+TOT PNL SERPL-MCNC: 0.2 (ref 0–0.5)
BILIRUBIN DIRECT+TOT PNL SERPL-MCNC: 0.2 (ref 0–0.5)
BILIRUBIN DIRECT+TOT PNL SERPL-MCNC: 0.3 (ref 0–0.8)
BILIRUBIN DIRECT+TOT PNL SERPL-MCNC: 0.3 (ref 0–0.8)
BUN SERPL-MCNC: 42.2 MG/DL (ref 8.4–25.7)
CALCIUM SERPL-MCNC: 9.5 MG/DL (ref 8.8–10)
CHLORIDE SERPL-SCNC: 111 MMOL/L (ref 98–107)
CO2 SERPL-SCNC: 24 MMOL/L (ref 23–31)
CREAT SERPL-MCNC: 1.43 MG/DL (ref 0.72–1.25)
CREAT UR-MCNC: 96.1 MG/DL (ref 58–161)
DEPRECATED CALCIDIOL+CALCIFEROL SERPL-MC: 60 NG/ML (ref 30–80)
ERYTHROCYTE [DISTWIDTH] IN BLOOD BY AUTOMATED COUNT: 14 % (ref 11.5–17)
GLOBULIN SER-MCNC: 3.6 G/DL (ref 2.4–3.5)
GLUCOSE SERPL-MCNC: 142 MG/DL (ref 82–115)
HCT VFR BLD AUTO: 37.8 % (ref 42–52)
HEMOLYSIS INTERF INDEX SERPL-ACNC: 3
HGB BLD-MCNC: 11.9 G/DL (ref 14–18)
ICTERIC INTERF INDEX SERPL-ACNC: 0
ICTERIC INTERF INDEX SERPL-ACNC: 0
LIPEMIC INTERF INDEX SERPL-ACNC: -2
LIPEMIC INTERF INDEX SERPL-ACNC: 2
MAGNESIUM SERPL-MCNC: 2.2 MG/DL (ref 1.6–2.6)
MCH RBC QN AUTO: 30.4 PG (ref 27–31)
MCHC RBC AUTO-ENTMCNC: 31.5 G/DL (ref 33–36)
MCV RBC AUTO: 96.4 FL (ref 80–94)
NRBC BLD AUTO-RTO: 0 % (ref 0–0.2)
PHOSPHATE SERPL-MCNC: 3.4 MG/DL (ref 2.3–4.7)
PLATELET # BLD AUTO: 209 10*3/UL (ref 130–400)
PMV BLD AUTO: 10.4 FL (ref 7.4–10.4)
POTASSIUM SERPL-SCNC: 4.8 MMOL/L (ref 3.5–5.1)
PROT SERPL-MCNC: 7 G/DL (ref 5.8–7.6)
PROT SERPL-MCNC: 7 G/DL (ref 5.8–7.6)
PROT UR STRIP-MCNC: 81.6 MG/DL
PROT/CREAT UR-RTO: 849.1
PTH-INTACT SERPL-MCNC: 62 PG/ML (ref 8.7–77.1)
RBC # BLD AUTO: 3.92 10*6/UL (ref 4.7–6.1)
SODIUM SERPL-SCNC: 143 MMOL/L (ref 136–145)
URATE SERPL-MCNC: 4.7 MG/DL (ref 3.5–7.2)
WBC # SPEC AUTO: 8 10*3/UL (ref 4.5–11.5)

## 2022-04-25 VITALS
DIASTOLIC BLOOD PRESSURE: 62 MMHG | BODY MASS INDEX: 38.41 KG/M2 | WEIGHT: 225 LBS | SYSTOLIC BLOOD PRESSURE: 124 MMHG | HEIGHT: 64 IN | OXYGEN SATURATION: 96 %

## 2022-05-10 ENCOUNTER — LAB VISIT (OUTPATIENT)
Dept: LAB | Facility: HOSPITAL | Age: 82
End: 2022-05-10
Attending: INTERNAL MEDICINE
Payer: MEDICARE

## 2022-05-10 DIAGNOSIS — R60.9 EDEMA, UNSPECIFIED TYPE: ICD-10-CM

## 2022-05-10 DIAGNOSIS — R94.4 NONSPECIFIC ABNORMAL RESULTS OF KIDNEY FUNCTION STUDY: Primary | ICD-10-CM

## 2022-05-10 LAB
ALBUMIN SERPL-MCNC: 3.4 GM/DL (ref 3.4–4.8)
ALP SERPL-CCNC: 88 UNIT/L (ref 40–150)
ALT SERPL-CCNC: 45 UNIT/L (ref 0–55)
AST SERPL-CCNC: 22 UNIT/L (ref 5–34)
BILIRUBIN DIRECT+TOT PNL SERPL-MCNC: 0.2 MG/DL (ref 0–0.5)
BILIRUBIN DIRECT+TOT PNL SERPL-MCNC: 0.2 MG/DL (ref 0–0.8)
BILIRUBIN DIRECT+TOT PNL SERPL-MCNC: 0.4 MG/DL
PROT SERPL-MCNC: 6.8 GM/DL (ref 5.8–7.6)

## 2022-05-10 PROCEDURE — 36415 COLL VENOUS BLD VENIPUNCTURE: CPT

## 2022-05-10 PROCEDURE — 80076 HEPATIC FUNCTION PANEL: CPT

## 2022-06-16 PROBLEM — E11.22 TYPE 2 DIABETES MELLITUS WITH STAGE 3A CHRONIC KIDNEY DISEASE, WITHOUT LONG-TERM CURRENT USE OF INSULIN: Status: ACTIVE | Noted: 2022-06-16

## 2022-06-16 PROBLEM — Z87.442 HISTORY OF KIDNEY STONES: Status: ACTIVE | Noted: 2022-06-16

## 2022-06-16 PROBLEM — I47.19 ATRIAL PAROXYSMAL TACHYCARDIA: Status: ACTIVE | Noted: 2022-06-16

## 2022-06-16 PROBLEM — D50.9 IRON DEFICIENCY ANEMIA: Status: ACTIVE | Noted: 2022-06-16

## 2022-06-16 PROBLEM — E78.5 HYPERLIPIDEMIA: Status: ACTIVE | Noted: 2022-06-16

## 2022-06-16 PROBLEM — I11.9 LVH (LEFT VENTRICULAR HYPERTROPHY) DUE TO HYPERTENSIVE DISEASE, WITHOUT HEART FAILURE: Status: ACTIVE | Noted: 2022-06-16

## 2022-06-16 PROBLEM — I25.10 CAD (CORONARY ARTERY DISEASE): Status: ACTIVE | Noted: 2022-06-16

## 2022-06-16 PROBLEM — G47.33 OSA ON CPAP: Status: ACTIVE | Noted: 2022-06-16

## 2022-06-16 PROBLEM — M54.30 SCIATICA: Status: ACTIVE | Noted: 2022-06-16

## 2022-06-16 PROBLEM — E66.01 SEVERE OBESITY (BMI 35.0-39.9) WITH COMORBIDITY: Status: ACTIVE | Noted: 2022-06-16

## 2022-06-16 PROBLEM — I51.89 DIASTOLIC DYSFUNCTION: Status: ACTIVE | Noted: 2022-06-16

## 2022-06-16 PROBLEM — M17.9 OA (OSTEOARTHRITIS) OF KNEE: Status: ACTIVE | Noted: 2022-06-16

## 2022-06-16 PROBLEM — I10 HYPERTENSION: Status: ACTIVE | Noted: 2022-06-16

## 2022-06-16 PROBLEM — E29.1 MALE HYPOGONADISM: Status: ACTIVE | Noted: 2022-06-16

## 2022-06-16 PROBLEM — N18.31 TYPE 2 DIABETES MELLITUS WITH STAGE 3A CHRONIC KIDNEY DISEASE, WITHOUT LONG-TERM CURRENT USE OF INSULIN: Status: ACTIVE | Noted: 2022-06-16

## 2022-06-16 PROBLEM — C61 PROSTATE CANCER: Status: ACTIVE | Noted: 2022-06-16

## 2022-06-16 PROBLEM — H93.19 TINNITUS: Status: ACTIVE | Noted: 2022-06-16

## 2022-07-02 ENCOUNTER — DOCUMENTATION ONLY (OUTPATIENT)
Dept: INTERNAL MEDICINE | Facility: CLINIC | Age: 82
End: 2022-07-02
Payer: MEDICARE

## 2022-08-04 ENCOUNTER — TELEPHONE (OUTPATIENT)
Dept: INTERNAL MEDICINE | Facility: CLINIC | Age: 82
End: 2022-08-04
Payer: MEDICARE

## 2022-08-04 DIAGNOSIS — E11.9 TYPE 2 DIABETES MELLITUS WITHOUT COMPLICATION, UNSPECIFIED WHETHER LONG TERM INSULIN USE: Primary | ICD-10-CM

## 2022-08-04 NOTE — TELEPHONE ENCOUNTER
----- Message from Thuy Raza Patient Care Assistant sent at 8/4/2022  2:30 PM CDT -----  Regarding: RE: GILMA Lowery 8/11/22 @1:20p  Pt. Confirmed appointment and fasting labs.  ----- Message -----  From: Francisca Reynoso LPN  Sent: 8/4/2022  12:45 PM CDT  To: Thuy Raza Patient Care Assistant  Subject: GILMA Lowery 8/11/22 @1:20p                       Are there any outstanding tasks in the chart? Pt will need fasting labs    Is there any documentation of tasks? No    Has patient seen another physician, been to the ER, C, or admitted to hospital since last visit?    Has the patient done blood work or imaging since last visit?

## 2022-08-11 ENCOUNTER — OFFICE VISIT (OUTPATIENT)
Dept: INTERNAL MEDICINE | Facility: CLINIC | Age: 82
End: 2022-08-11
Payer: MEDICARE

## 2022-08-11 VITALS
OXYGEN SATURATION: 97 % | HEART RATE: 61 BPM | RESPIRATION RATE: 20 BRPM | HEIGHT: 64 IN | SYSTOLIC BLOOD PRESSURE: 124 MMHG | WEIGHT: 239 LBS | DIASTOLIC BLOOD PRESSURE: 64 MMHG | BODY MASS INDEX: 40.8 KG/M2

## 2022-08-11 DIAGNOSIS — I25.10 CORONARY ARTERY DISEASE INVOLVING NATIVE CORONARY ARTERY OF NATIVE HEART WITHOUT ANGINA PECTORIS: ICD-10-CM

## 2022-08-11 DIAGNOSIS — E78.5 HYPERLIPIDEMIA, UNSPECIFIED HYPERLIPIDEMIA TYPE: Primary | ICD-10-CM

## 2022-08-11 DIAGNOSIS — Z00.00 ENCOUNTER FOR MEDICARE ANNUAL WELLNESS EXAM: ICD-10-CM

## 2022-08-11 DIAGNOSIS — N18.31 TYPE 2 DIABETES MELLITUS WITH STAGE 3A CHRONIC KIDNEY DISEASE, WITHOUT LONG-TERM CURRENT USE OF INSULIN: ICD-10-CM

## 2022-08-11 DIAGNOSIS — E11.22 TYPE 2 DIABETES MELLITUS WITH STAGE 3A CHRONIC KIDNEY DISEASE, WITHOUT LONG-TERM CURRENT USE OF INSULIN: ICD-10-CM

## 2022-08-11 DIAGNOSIS — N18.30 STAGE 3 CHRONIC KIDNEY DISEASE, UNSPECIFIED WHETHER STAGE 3A OR 3B CKD: ICD-10-CM

## 2022-08-11 DIAGNOSIS — I10 PRIMARY HYPERTENSION: ICD-10-CM

## 2022-08-11 PROBLEM — N18.9 CKD (CHRONIC KIDNEY DISEASE): Status: ACTIVE | Noted: 2022-08-11

## 2022-08-11 PROCEDURE — G0439 PPPS, SUBSEQ VISIT: HCPCS | Mod: ,,, | Performed by: INTERNAL MEDICINE

## 2022-08-11 PROCEDURE — G0439 PR MEDICARE ANNUAL WELLNESS SUBSEQUENT VISIT: ICD-10-PCS | Mod: ,,, | Performed by: INTERNAL MEDICINE

## 2022-08-11 RX ORDER — LISINOPRIL 20 MG/1
20 TABLET ORAL NIGHTLY
Status: ON HOLD | COMMUNITY
End: 2023-01-11 | Stop reason: SDUPTHER

## 2022-08-11 NOTE — PATIENT INSTRUCTIONS
[place],     If you are due for any health screening(s) below please notify me so we can arrange them to be ordered and scheduled to maintain your health. Most healthy patients complete it. Don't lose out on improving your health.       Eye Exam: DUE  Blood Pressure <= 139/89: NO                Diabetic Retinal Eye Exam    Diabetes is the #1 cause of blindness in the [place] - early detection before signs or symptoms develop can prevent debilitating blindness.    Once-a-year screening is recommended for all diabetic patients. This exam can prevent and treat diabetes complications in the eye before developing symptoms. This can be done with a special camera is used to take photographs of the back of your eye without having to dilate them, or you can see an eye doctor for a full dilated exam.    Although you are still overdue for this important screening, due to the COVID-19 pandemic, we recommend scheduling it in the near future.

## 2022-08-11 NOTE — ASSESSMENT & PLAN NOTE
Health Maintenance Due   Topic Date Due    Foot Exam  Never done    TETANUS VACCINE  Never done    Shingles Vaccine (1 of 2) Never done    Diabetes Urine Screening  03/12/2020    Eye Exam  11/12/2020    COVID-19 Vaccine (3 - Booster for Pfizer series) 02/09/2022       I have reviewed the nurse notes and flow sheets to review the HRA, ADL, TUG, Whisper test, fall screening and mini-cog assessment.  A personalized 5-10 year written screening schedule and personal prevention plan has been developed using the USPSTF age appropriate recommendations and this was provided to the patient at the end of today's visit.  Please see the AVS for those details.

## 2022-08-11 NOTE — PROGRESS NOTES
Subjective:      Chief Complaint: Annual Exam (Discuss labs )      HPI:He is here for medicare wellness.  No complaints.  He doesn't check his BP at home.  He doesn't have any s/sx of hypoglycemia.    Problem Noted   Encounter for Medicare Annual Wellness Exam 8/11/2022   Ckd (Chronic Kidney Disease) 8/11/2022    He is followed by Dr. Stout     Type 2 Diabetes Mellitus With Stage 3a Chronic Kidney Disease, Without Long-Term Current Use of Insulin 6/16/2022   Severe Obesity (Bmi 35.0-39.9) With Comorbidity 6/16/2022   Hypertension 6/16/2022   Atrial Paroxysmal Tachycardia 6/16/2022   Hyperlipidemia 6/16/2022   Cad (Coronary Artery Disease) 6/16/2022    S/p CABG, followed by Dr. Gaston.     Male Hypogonadism 6/16/2022    Patient elected not to take hormone replacement.     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     Lb On Cpap 6/16/2022   Oa (Osteoarthritis) of Knee 6/16/2022   Tinnitus 6/16/2022   Sciatica 6/16/2022        The patient's Health Maintenance was reviewed and the following appears to be due:   Health Maintenance Due   Topic Date Due    Foot Exam  Never done    TETANUS VACCINE  Never done    Shingles Vaccine (1 of 2) Never done    Diabetes Urine Screening  03/12/2020    Eye Exam  11/12/2020    COVID-19 Vaccine (3 - Booster for Pfizer series) 02/09/2022       Past Medical History:  Past Medical History:   Diagnosis Date    Atrial paroxysmal tachycardia     Chronic kidney disease, stage 3     Coronary arteriosclerosis     Diastolic dysfunction     HTN (hypertension)     Iron deficiency anemia, unspecified     Obstructive sleep apnea syndrome     Prostate cancer     Pure hypercholesterolemia     Type 2 diabetes mellitus without complications       Past Surgical History:   Procedure Laterality Date    CBP      CORONARY ARTERY BYPASS GRAFT (CABG)      x 3    Repair of finger laceration      resection of atypical mole       Review of patient's allergies indicates:   Allergen Reactions    Ramipril      tinnitus    Rosiglitazone      Dyspnea      Rosuvastatin      Current Outpatient Medications on File Prior to Visit   Medication Sig Dispense Refill    allopurinoL (ZYLOPRIM) 300 MG tablet Take 150 mg by mouth once daily at 6am.      atorvastatin (LIPITOR) 80 MG tablet Take 80 mg by mouth once daily.      ezetimibe (ZETIA) 10 mg tablet Take 10 mg by mouth once daily.      glimepiride (AMARYL) 4 MG tablet Take 4 mg by mouth daily with breakfast.      isosorbide mononitrate (IMDUR) 30 MG 24 hr tablet Take 15 mg by mouth once daily.      JANUVIA 50 mg Tab 50 mg once daily.      lisinopriL (PRINIVIL,ZESTRIL) 20 MG tablet Take 20 mg by mouth once daily.      metoprolol succinate (TOPROL-XL) 50 MG 24 hr tablet Take 50 mg by mouth once daily.      [DISCONTINUED] lisinopriL 10 MG tablet Take 20 mg by mouth once daily.      [DISCONTINUED] triamterene-hydrochlorothiazide 37.5-25 mg (MAXZIDE-25) 37.5-25 mg per tablet Take 1 tablet by mouth once daily.       No current facility-administered medications on file prior to visit.     Social History     Socioeconomic History    Marital status:    Tobacco Use    Smoking status: Never Smoker    Smokeless tobacco: Never Used   Substance and Sexual Activity    Alcohol use: Never    Drug use: Never    Sexual activity: Not Currently     Family History   Problem Relation Age of Onset    Cancer Mother         cancer -- gastric and liver    Sudden death Mother     Cancer Father     Sudden death Father     Cancer Sister         breast    Breast cancer Sister     Coronary artery disease Paternal Grandfather        Review of Systems    Objective:   /64 (BP Location: Right arm, Patient Position:  "Sitting, BP Method: Small (Manual))   Pulse 61   Resp 20   Ht 5' 4.17" (1.63 m)   Wt 108.4 kg (239 lb)   SpO2 97%   BMI 40.80 kg/m²     Physical Exam  Constitutional:       General: He is not in acute distress.     Appearance: Normal appearance. He is not toxic-appearing.   HENT:      Head: Normocephalic and atraumatic.   Eyes:      General: No scleral icterus.  Pulmonary:      Effort: Pulmonary effort is normal.   Feet:      Right foot:      Skin integrity: Skin integrity normal.      Left foot:      Skin integrity: Skin integrity normal.      Comments: Protective Sensation (w/ 10 gram monofilament):  Right: Intact  Left: Intact    Visual Inspection:  Normal -  Bilateral    Pedal Pulses:   Right: Present  Left: Present    Posterior tibialis:   Right:Present  Left: Present      Skin:     General: Skin is warm and dry.   Neurological:      General: No focal deficit present.      Mental Status: He is alert and oriented to person, place, and time. Mental status is at baseline.      Gait: Gait is intact.   Psychiatric:         Mood and Affect: Mood normal.         Behavior: Behavior normal.         Thought Content: Thought content normal.         Judgment: Judgment normal.       Assessment and Plan:     Encounter for Medicare annual wellness exam  Health Maintenance Due   Topic Date Due    Foot Exam  Never done    TETANUS VACCINE  Never done    Shingles Vaccine (1 of 2) Never done    Diabetes Urine Screening  03/12/2020    Eye Exam  11/12/2020    COVID-19 Vaccine (3 - Booster for Pfizer series) 02/09/2022       I have reviewed the nurse notes and flow sheets to review the HRA, ADL, TUG, Whisper test, fall screening and mini-cog assessment.  A personalized 5-10 year written screening schedule and personal prevention plan has been developed using the USPSTF age appropriate recommendations and this was provided to the patient at the end of today's visit.  Please see the AVS for those details.          Type 2 " diabetes mellitus with stage 3a chronic kidney disease, without long-term current use of insulin  He declined referral for eye exam.    A1C isn't perfect.  But its stable.  CCM    CAD (coronary artery disease)  He is clinically stable.    Hypertension  Controlled, cont med.        Follow up in about 4 months (around 12/11/2022).       [unfilled]  No orders of the defined types were placed in this encounter.

## 2022-09-13 DIAGNOSIS — E11.22 TYPE 2 DIABETES MELLITUS WITH STAGE 3A CHRONIC KIDNEY DISEASE, WITHOUT LONG-TERM CURRENT USE OF INSULIN: Primary | ICD-10-CM

## 2022-09-13 DIAGNOSIS — N18.31 TYPE 2 DIABETES MELLITUS WITH STAGE 3A CHRONIC KIDNEY DISEASE, WITHOUT LONG-TERM CURRENT USE OF INSULIN: Primary | ICD-10-CM

## 2022-09-13 RX ORDER — GLIMEPIRIDE 4 MG/1
4 TABLET ORAL
Qty: 90 TABLET | Refills: 3 | Status: SHIPPED | OUTPATIENT
Start: 2022-09-13 | End: 2023-09-06 | Stop reason: SDUPTHER

## 2022-11-03 ENCOUNTER — LAB VISIT (OUTPATIENT)
Dept: LAB | Facility: HOSPITAL | Age: 82
End: 2022-11-03
Attending: INTERNAL MEDICINE
Payer: MEDICARE

## 2022-11-03 DIAGNOSIS — R80.9 PROTEINURIA, UNSPECIFIED TYPE: ICD-10-CM

## 2022-11-03 DIAGNOSIS — N18.9 CKD (CHRONIC KIDNEY DISEASE): Primary | ICD-10-CM

## 2022-11-03 LAB
ALBUMIN SERPL-MCNC: 3.3 GM/DL (ref 3.4–4.8)
BUN SERPL-MCNC: 44.4 MG/DL (ref 8.4–25.7)
CALCIUM SERPL-MCNC: 9.8 MG/DL (ref 8.8–10)
CHLORIDE SERPL-SCNC: 109 MMOL/L (ref 98–107)
CO2 SERPL-SCNC: 25 MMOL/L (ref 23–31)
CREAT SERPL-MCNC: 1.87 MG/DL (ref 0.73–1.18)
CREAT UR-MCNC: 118.9 MG/DL (ref 63–166)
GFR SERPLBLD CREATININE-BSD FMLA CKD-EPI: 35 MLS/MIN/1.73/M2
GLUCOSE SERPL-MCNC: 166 MG/DL (ref 82–115)
PHOSPHATE SERPL-MCNC: 3.8 MG/DL (ref 2.3–4.7)
POTASSIUM SERPL-SCNC: 4.8 MMOL/L (ref 3.5–5.1)
PROT UR STRIP-MCNC: 88.1 MG/DL
PROT/CREAT UR-RTO: 741 MG/GM CR
SODIUM SERPL-SCNC: 143 MMOL/L (ref 136–145)

## 2022-11-03 PROCEDURE — 82570 ASSAY OF URINE CREATININE: CPT

## 2022-11-03 PROCEDURE — 36415 COLL VENOUS BLD VENIPUNCTURE: CPT

## 2022-11-03 PROCEDURE — 80069 RENAL FUNCTION PANEL: CPT

## 2022-11-09 DIAGNOSIS — E11.22 TYPE 2 DIABETES MELLITUS WITH STAGE 3A CHRONIC KIDNEY DISEASE, WITHOUT LONG-TERM CURRENT USE OF INSULIN: Primary | ICD-10-CM

## 2022-11-09 DIAGNOSIS — N18.31 TYPE 2 DIABETES MELLITUS WITH STAGE 3A CHRONIC KIDNEY DISEASE, WITHOUT LONG-TERM CURRENT USE OF INSULIN: Primary | ICD-10-CM

## 2022-11-14 PROBLEM — Z00.00 ENCOUNTER FOR MEDICARE ANNUAL WELLNESS EXAM: Status: RESOLVED | Noted: 2022-08-11 | Resolved: 2022-11-14

## 2022-12-07 DIAGNOSIS — E11.22 TYPE 2 DIABETES MELLITUS WITH STAGE 3A CHRONIC KIDNEY DISEASE, WITHOUT LONG-TERM CURRENT USE OF INSULIN: Primary | ICD-10-CM

## 2022-12-07 DIAGNOSIS — N18.31 TYPE 2 DIABETES MELLITUS WITH STAGE 3A CHRONIC KIDNEY DISEASE, WITHOUT LONG-TERM CURRENT USE OF INSULIN: Primary | ICD-10-CM

## 2022-12-07 RX ORDER — SITAGLIPTIN 50 MG/1
TABLET, FILM COATED ORAL
Qty: 30 TABLET | Refills: 11 | Status: SHIPPED | OUTPATIENT
Start: 2022-12-07 | End: 2022-12-14

## 2022-12-07 NOTE — TELEPHONE ENCOUNTER
----- Message from Francisca Reynoso LPN sent at 12/7/2022  3:45 PM CST -----  Regarding: GILMA Lowery 12/14/22 @11:00a  Are there any outstanding tasks in the chart? Pt will need nonfasting labs    Is there any documentation of tasks? no    Has patient seen another physician, been to the ER, UCC, or admitted to hospital since last visit?    Has the patient done blood work or imaging since last visit?

## 2022-12-12 ENCOUNTER — LAB VISIT (OUTPATIENT)
Dept: LAB | Facility: HOSPITAL | Age: 82
End: 2022-12-12
Attending: INTERNAL MEDICINE
Payer: MEDICARE

## 2022-12-12 DIAGNOSIS — E11.22 TYPE 2 DIABETES MELLITUS WITH STAGE 3A CHRONIC KIDNEY DISEASE, WITHOUT LONG-TERM CURRENT USE OF INSULIN: ICD-10-CM

## 2022-12-12 DIAGNOSIS — N18.31 TYPE 2 DIABETES MELLITUS WITH STAGE 3A CHRONIC KIDNEY DISEASE, WITHOUT LONG-TERM CURRENT USE OF INSULIN: ICD-10-CM

## 2022-12-12 LAB
EST. AVERAGE GLUCOSE BLD GHB EST-MCNC: 162.8 MG/DL
HBA1C MFR BLD: 7.3 %

## 2022-12-12 PROCEDURE — 83036 HEMOGLOBIN GLYCOSYLATED A1C: CPT

## 2022-12-12 PROCEDURE — 36415 COLL VENOUS BLD VENIPUNCTURE: CPT

## 2022-12-14 ENCOUNTER — OFFICE VISIT (OUTPATIENT)
Dept: INTERNAL MEDICINE | Facility: CLINIC | Age: 82
End: 2022-12-14
Payer: MEDICARE

## 2022-12-14 DIAGNOSIS — I47.19 ATRIAL PAROXYSMAL TACHYCARDIA: ICD-10-CM

## 2022-12-14 DIAGNOSIS — E11.22 TYPE 2 DIABETES MELLITUS WITH STAGE 3A CHRONIC KIDNEY DISEASE, WITHOUT LONG-TERM CURRENT USE OF INSULIN: Primary | ICD-10-CM

## 2022-12-14 DIAGNOSIS — N18.31 TYPE 2 DIABETES MELLITUS WITH STAGE 3A CHRONIC KIDNEY DISEASE, WITHOUT LONG-TERM CURRENT USE OF INSULIN: Primary | ICD-10-CM

## 2022-12-14 DIAGNOSIS — E66.01 SEVERE OBESITY (BMI 35.0-39.9) WITH COMORBIDITY: ICD-10-CM

## 2022-12-14 PROCEDURE — 99214 PR OFFICE/OUTPT VISIT, EST, LEVL IV, 30-39 MIN: ICD-10-PCS | Mod: 95,,, | Performed by: NURSE PRACTITIONER

## 2022-12-14 PROCEDURE — 99214 OFFICE O/P EST MOD 30 MIN: CPT | Mod: 95,,, | Performed by: NURSE PRACTITIONER

## 2022-12-14 NOTE — PROGRESS NOTES
Patient ID: [id number]     Chief Complaint: Follow-up (Pt is here for f/u and he his doing okay)      HPI:     This is a telemedicine note. Patient was treated using telemedicine, real time audio and video, according to [facility] protocols. I, Bernadette REDDING, conducted the visit from the [facility]. The patient participated in the visit at a non-[facility] location selected by the patient, identified below. I am licensed in the state where the patient stated they are located. The patient stated that they understood and accepted the privacy and security risks to their information at their location. This visit is not recorded.    Patient was located at the patient's home with wife and daughter present. He is very hard of hearing.      Damon Moran is a 82 y.o. male here today for a telemedicine visit for T2DM recheck. Significant h/o CAD and CKD, as well. Currently on Januvia 50 mg daily.  HgA1c 7.3 and stable. Denies polyuria, polydipsia, polyphagia. No further complaints. Daughter concerned about need for diabetic urine screening, which I see that was done 11/3/22. Issues with affordability with Januvia. Will look for samples. Consider alternative if needed. He continues to refuse eye exam recommendations. Also, wife reports he eats a lot of oranges.         ----------------------------  Atrial paroxysmal tachycardia  Chronic kidney disease, stage 3  Coronary arteriosclerosis  Diastolic dysfunction  HTN (hypertension)  Iron deficiency anemia, unspecified  Obstructive sleep apnea syndrome  Prostate cancer  Pure hypercholesterolemia  Type 2 diabetes mellitus without complications     Past Surgical History:   Procedure Laterality Date    CBP      CORONARY ARTERY BYPASS GRAFT (CABG)      x 3    Repair of finger laceration      resection of atypical mole         Review of patient's allergies indicates:   Allergen Reactions    Ramipril      tinnitus    Rosiglitazone      Dyspnea      Rosuvastatin         Outpatient Medications Marked as Taking for the 12/14/22 encounter (Office Visit) with Bernadette Hernandez NP   Medication Sig Dispense Refill    allopurinoL (ZYLOPRIM) 300 MG tablet Take 150 mg by mouth once daily at 6am.      atorvastatin (LIPITOR) 80 MG tablet Take 80 mg by mouth once daily.      ezetimibe (ZETIA) 10 mg tablet Take 10 mg by mouth once daily.      glimepiride (AMARYL) 4 MG tablet Take 1 tablet (4 mg total) by mouth daily with breakfast. 90 tablet 3    isosorbide mononitrate (IMDUR) 30 MG 24 hr tablet Take 15 mg by mouth once daily.      lisinopriL (PRINIVIL,ZESTRIL) 20 MG tablet Take 20 mg by mouth once daily.      metoprolol succinate (TOPROL-XL) 50 MG 24 hr tablet Take 50 mg by mouth once daily.      [DISCONTINUED] SITagliptin phosphate (JANUVIA) 50 MG Tab Take 1 tablet (50 mg total) by mouth once daily. 30 tablet 0       Social History     Socioeconomic History    Marital status:    Tobacco Use    Smoking status: Never    Smokeless tobacco: Never   Substance and Sexual Activity    Alcohol use: Never    Drug use: Never    Sexual activity: Not Currently        Family History   Problem Relation Age of Onset    Cancer Mother         cancer -- gastric and liver    Sudden death Mother     Cancer Father     Sudden death Father     Cancer Sister         breast    Breast cancer Sister     Coronary artery disease Paternal Grandfather         Patient Care Team:  Renuka Lowery MD as PCP - General (Internal Medicine)  MD Eddei Basurto MD as Consulting Physician (Nephrology)  Gurinder Gaston MD as Consulting Physician (Cardiology)      Subjective:       ROS    See HPI for details    Constitutional: Denies Change in appetite.  Eye: Denies Blurred vision.   ENT: Admits Decreased hearing.  Respiratory: Denies Shortness of breath. Denies Shortness of breath with exertion.  Cardiovascular: Denies Chest pain at rest. Denies Chest pain with exertion. Denies Irregular heartbeat. Denies  Palpitations.    All Other ROS: Negative except as stated in HPI.       Objective:     There were no vitals taken for this visit.    Physical Exam    Physical Exam: LIMITED DUE TO TELEMEDICINE RESTRICTIONS.  General: Alert and oriented, No acute distress.  Head: Normocephalic, Atraumatic.  Eye: Sclera non-icteric.  Neck/Thyroid:  Full range of motion.  Respiratory: Non-labored respirations, Symmetrical chest wall expansion.  Musculoskeletal: Normal range of motion.  Integumentary: Warm, Dry, Intact, No visible suspicious lesions or rashes. No diaphoresis.   Neurologic: No focal deficits  Psychiatric: Normal interaction, Coherent speech, Euthymic mood, Appropriate affect       Assessment:       ICD-10-CM ICD-9-CM   1. Type 2 diabetes mellitus with stage 3a chronic kidney disease, without long-term current use of insulin  E11.22 250.40    N18.31 585.3   2. Atrial paroxysmal tachycardia  I47.1 427.0   3. Severe obesity (BMI 35.0-39.9) with comorbidity  E66.01 278.01        Plan:     1. Type 2 diabetes mellitus with stage 3a chronic kidney disease, without long-term current use of insulin  DIABETES RECOMMENDATIONS:  low cholesterol diet, weight control and daily exercise discussed with recommendation for 150 minutes per week, all medications, side effects and compliance discussed carefully, low carbohydrate diet , and continue diet and exercise for management of diabetes; F/u 4 months and as needed.          - SITagliptin phosphate (JANUVIA) 50 MG Tab; Take 1 tablet (50 mg total) by mouth once daily.  Dispense: 30 tablet; Refill: 5    2. Atrial paroxysmal tachycardia  Per Dr. Gaston    3. Severe obesity (BMI 35.0-39.9) with comorbidity  HEALTH EDUCATION RECOMMENDATIONS:  weight control, diet and cholesterol, exercise 150 minutes per week, stress reduction, and adequate sleep 6-8 hours per night        Medication List with Changes/Refills   Current Medications    ALLOPURINOL (ZYLOPRIM) 300 MG TABLET    Take 150 mg by  mouth once daily at 6am.       Start Date: 3/6/2022  End Date: --    ATORVASTATIN (LIPITOR) 80 MG TABLET    Take 80 mg by mouth once daily.       Start Date: 3/15/2022 End Date: --    EZETIMIBE (ZETIA) 10 MG TABLET    Take 10 mg by mouth once daily.       Start Date: 1/31/2022 End Date: --    GLIMEPIRIDE (AMARYL) 4 MG TABLET    Take 1 tablet (4 mg total) by mouth daily with breakfast.       Start Date: 9/13/2022 End Date: --    ISOSORBIDE MONONITRATE (IMDUR) 30 MG 24 HR TABLET    Take 15 mg by mouth once daily.       Start Date: 2/20/2022 End Date: --    LISINOPRIL (PRINIVIL,ZESTRIL) 20 MG TABLET    Take 20 mg by mouth once daily.       Start Date: --        End Date: --    METOPROLOL SUCCINATE (TOPROL-XL) 50 MG 24 HR TABLET    Take 50 mg by mouth once daily.       Start Date: 4/13/2022 End Date: --   Changed and/or Refilled Medications    Modified Medication Previous Medication    SITAGLIPTIN PHOSPHATE (JANUVIA) 50 MG TAB SITagliptin phosphate (JANUVIA) 50 MG Tab       Take 1 tablet (50 mg total) by mouth once daily.    Take 1 tablet (50 mg total) by mouth once daily.       Start Date: 12/14/2022End Date: --    Start Date: 11/9/2022 End Date: 12/14/2022   Discontinued Medications    SITAGLIPTIN PHOSPHATE (JANUVIA) 50 MG TAB    TAKE 1 TABLET BY MOUTH EVERY DAY       Start Date: 12/7/2022 End Date: 12/14/2022          Follow up in about 4 months (around 4/14/2023) for Diabetes Visit, With Dr. Lowery. In addition to their scheduled follow up, the patient has also been instructed to follow up on as needed basis.       Video Time Documentation:  Spent 10 minutes with patient face to face discussed health concerns. More than 50% of this time was spent in counseling and coordination of care.

## 2022-12-15 ENCOUNTER — TELEPHONE (OUTPATIENT)
Dept: INTERNAL MEDICINE | Facility: CLINIC | Age: 82
End: 2022-12-15
Payer: MEDICARE

## 2022-12-16 DIAGNOSIS — R53.81 DEBILITY: Primary | ICD-10-CM

## 2022-12-16 DIAGNOSIS — R53.1 WEAKNESS: ICD-10-CM

## 2022-12-16 NOTE — TELEPHONE ENCOUNTER
I spoke with daughter and wife. Pt is just dealing with some debility and weakness issues and they both want him to get stronger and have HH PT to make pt exercise more. I've ordered HH with [facility].

## 2022-12-21 PROCEDURE — G0180 PR HOME HEALTH MD CERTIFICATION: ICD-10-PCS | Mod: ,,, | Performed by: INTERNAL MEDICINE

## 2022-12-21 PROCEDURE — G0180 MD CERTIFICATION HHA PATIENT: HCPCS | Mod: ,,, | Performed by: INTERNAL MEDICINE

## 2023-01-08 ENCOUNTER — HOSPITAL ENCOUNTER (INPATIENT)
Facility: HOSPITAL | Age: 83
LOS: 3 days | Discharge: HOME HEALTH | DRG: 378 | End: 2023-01-11
Attending: EMERGENCY MEDICINE | Admitting: INTERNAL MEDICINE
Payer: MEDICARE

## 2023-01-08 DIAGNOSIS — K92.2 LOWER GI BLEED: Primary | ICD-10-CM

## 2023-01-08 DIAGNOSIS — K92.1 HEMATOCHEZIA: ICD-10-CM

## 2023-01-08 DIAGNOSIS — R55 NEAR SYNCOPE: ICD-10-CM

## 2023-01-08 DIAGNOSIS — R79.89 ELEVATED TROPONIN: ICD-10-CM

## 2023-01-08 LAB
ALBUMIN SERPL-MCNC: 3.1 G/DL (ref 3.4–4.8)
ALBUMIN/GLOB SERPL: 0.9 RATIO (ref 1.1–2)
ALP SERPL-CCNC: 104 UNIT/L (ref 40–150)
ALT SERPL-CCNC: 54 UNIT/L (ref 0–55)
APPEARANCE UR: CLEAR
APTT PPP: 30.9 SECONDS (ref 23.4–33.9)
AST SERPL-CCNC: 28 UNIT/L (ref 5–34)
BACTERIA #/AREA URNS AUTO: NORMAL /HPF
BASOPHILS # BLD AUTO: 0.04 X10(3)/MCL (ref 0–0.2)
BASOPHILS NFR BLD AUTO: 0.5 %
BILIRUB UR QL STRIP.AUTO: NEGATIVE MG/DL
BILIRUBIN DIRECT+TOT PNL SERPL-MCNC: 0.4 MG/DL
BUN SERPL-MCNC: 43.4 MG/DL (ref 8.4–25.7)
CALCIUM SERPL-MCNC: 9.3 MG/DL (ref 8.8–10)
CHLORIDE SERPL-SCNC: 111 MMOL/L (ref 98–107)
CO2 SERPL-SCNC: 24 MMOL/L (ref 23–31)
COLOR UR AUTO: ABNORMAL
CREAT SERPL-MCNC: 1.89 MG/DL (ref 0.73–1.18)
EOSINOPHIL # BLD AUTO: 0.39 X10(3)/MCL (ref 0–0.9)
EOSINOPHIL NFR BLD AUTO: 4.9 %
ERYTHROCYTE [DISTWIDTH] IN BLOOD BY AUTOMATED COUNT: 14.4 % (ref 11.6–14.4)
GFR SERPLBLD CREATININE-BSD FMLA CKD-EPI: 35 MLS/MIN/1.73/M2
GLOBULIN SER-MCNC: 3.3 GM/DL (ref 2.4–3.5)
GLUCOSE SERPL-MCNC: 161 MG/DL (ref 82–115)
GLUCOSE UR QL STRIP.AUTO: NEGATIVE MG/DL
HCT VFR BLD AUTO: 32.4 % (ref 42–52)
HCT VFR BLD AUTO: 32.8 % (ref 42–52)
HEMOCCULT SP2 STL QL: POSITIVE
HGB BLD-MCNC: 10.2 GM/DL (ref 14–18)
HGB BLD-MCNC: 10.4 GM/DL (ref 14–18)
IMM GRANULOCYTES # BLD AUTO: 0.1 X10(3)/MCL (ref 0–0.04)
IMM GRANULOCYTES NFR BLD AUTO: 1.3 %
INR BLD: 1.05 (ref 2–3)
KETONES UR QL STRIP.AUTO: NEGATIVE MG/DL
LEUKOCYTE ESTERASE UR QL STRIP.AUTO: NEGATIVE UNIT/L
LYMPHOCYTES # BLD AUTO: 1.45 X10(3)/MCL (ref 0.6–4.6)
LYMPHOCYTES NFR BLD AUTO: 18.1 %
MCH RBC QN AUTO: 30.1 PG
MCHC RBC AUTO-ENTMCNC: 31.5 MG/DL (ref 33–36)
MCV RBC AUTO: 95.6 FL (ref 80–94)
MONOCYTES # BLD AUTO: 0.95 X10(3)/MCL (ref 0.1–1.3)
MONOCYTES NFR BLD AUTO: 11.9 %
NEUTROPHILS # BLD AUTO: 5.07 X10(3)/MCL (ref 2.1–9.2)
NEUTROPHILS NFR BLD AUTO: 63.3 %
NITRITE UR QL STRIP.AUTO: NEGATIVE
NRBC BLD AUTO-RTO: 0 % (ref 0–1)
PH UR STRIP.AUTO: 6 [PH]
PLATELET # BLD AUTO: 281 X10(3)/MCL (ref 140–371)
PMV BLD AUTO: 9.8 FL (ref 9.4–12.4)
POCT GLUCOSE: 132 MG/DL (ref 70–110)
POTASSIUM SERPL-SCNC: 4.8 MMOL/L (ref 3.5–5.1)
PROT SERPL-MCNC: 6.4 GM/DL (ref 5.8–7.6)
PROT UR QL STRIP.AUTO: 30 MG/DL
PROTHROMBIN TIME: 13.5 SECONDS (ref 11.7–14.5)
RBC # BLD AUTO: 3.39 X10(6)/MCL (ref 4.7–6.1)
RBC #/AREA URNS AUTO: NORMAL /HPF
RBC UR QL AUTO: ABNORMAL UNIT/L
SODIUM SERPL-SCNC: 143 MMOL/L (ref 136–145)
SP GR UR STRIP.AUTO: 1.02
SQUAMOUS #/AREA URNS AUTO: NORMAL /HPF
TROPONIN I SERPL-MCNC: 0.06 NG/ML (ref 0–0.04)
TROPONIN I SERPL-MCNC: 0.07 NG/ML (ref 0–0.04)
TROPONIN I SERPL-MCNC: 0.08 NG/ML (ref 0–0.04)
UROBILINOGEN UR STRIP-ACNC: 0.2 MG/DL
WBC # SPEC AUTO: 8 X10(3)/MCL (ref 4.5–11.5)
WBC #/AREA URNS AUTO: NORMAL /HPF

## 2023-01-08 PROCEDURE — 81003 URINALYSIS AUTO W/O SCOPE: CPT | Performed by: EMERGENCY MEDICINE

## 2023-01-08 PROCEDURE — 80053 COMPREHEN METABOLIC PANEL: CPT | Performed by: EMERGENCY MEDICINE

## 2023-01-08 PROCEDURE — 85610 PROTHROMBIN TIME: CPT | Performed by: EMERGENCY MEDICINE

## 2023-01-08 PROCEDURE — 84484 ASSAY OF TROPONIN QUANT: CPT | Performed by: INTERNAL MEDICINE

## 2023-01-08 PROCEDURE — 63600175 PHARM REV CODE 636 W HCPCS: Performed by: INTERNAL MEDICINE

## 2023-01-08 PROCEDURE — 82272 OCCULT BLD FECES 1-3 TESTS: CPT

## 2023-01-08 PROCEDURE — 84484 ASSAY OF TROPONIN QUANT: CPT | Performed by: EMERGENCY MEDICINE

## 2023-01-08 PROCEDURE — 21400001 HC TELEMETRY ROOM

## 2023-01-08 PROCEDURE — 93005 ELECTROCARDIOGRAM TRACING: CPT

## 2023-01-08 PROCEDURE — 85730 THROMBOPLASTIN TIME PARTIAL: CPT | Performed by: EMERGENCY MEDICINE

## 2023-01-08 PROCEDURE — 93010 ELECTROCARDIOGRAM REPORT: CPT | Mod: ,,, | Performed by: INTERNAL MEDICINE

## 2023-01-08 PROCEDURE — 25500020 PHARM REV CODE 255: Performed by: EMERGENCY MEDICINE

## 2023-01-08 PROCEDURE — 25000003 PHARM REV CODE 250: Performed by: INTERNAL MEDICINE

## 2023-01-08 PROCEDURE — C9113 INJ PANTOPRAZOLE SODIUM, VIA: HCPCS | Performed by: INTERNAL MEDICINE

## 2023-01-08 PROCEDURE — 99285 EMERGENCY DEPT VISIT HI MDM: CPT | Mod: 25

## 2023-01-08 PROCEDURE — 82272 OCCULT BLD FECES 1-3 TESTS: CPT | Performed by: EMERGENCY MEDICINE

## 2023-01-08 PROCEDURE — 85025 COMPLETE CBC W/AUTO DIFF WBC: CPT | Performed by: EMERGENCY MEDICINE

## 2023-01-08 PROCEDURE — 93010 EKG 12-LEAD: ICD-10-PCS | Mod: ,,, | Performed by: INTERNAL MEDICINE

## 2023-01-08 PROCEDURE — 63600175 PHARM REV CODE 636 W HCPCS: Performed by: EMERGENCY MEDICINE

## 2023-01-08 PROCEDURE — 96374 THER/PROPH/DIAG INJ IV PUSH: CPT

## 2023-01-08 PROCEDURE — C9113 INJ PANTOPRAZOLE SODIUM, VIA: HCPCS | Performed by: EMERGENCY MEDICINE

## 2023-01-08 PROCEDURE — 85014 HEMATOCRIT: CPT | Performed by: INTERNAL MEDICINE

## 2023-01-08 RX ORDER — IBUPROFEN 200 MG
16 TABLET ORAL
Status: DISCONTINUED | OUTPATIENT
Start: 2023-01-08 | End: 2023-01-11 | Stop reason: HOSPADM

## 2023-01-08 RX ORDER — GLUCAGON 1 MG
1 KIT INJECTION
Status: DISCONTINUED | OUTPATIENT
Start: 2023-01-08 | End: 2023-01-11 | Stop reason: HOSPADM

## 2023-01-08 RX ORDER — INSULIN ASPART 100 [IU]/ML
0-5 INJECTION, SOLUTION INTRAVENOUS; SUBCUTANEOUS
Status: DISCONTINUED | OUTPATIENT
Start: 2023-01-08 | End: 2023-01-11 | Stop reason: HOSPADM

## 2023-01-08 RX ORDER — LABETALOL HCL 20 MG/4 ML
20 SYRINGE (ML) INTRAVENOUS EVERY 4 HOURS PRN
Status: DISCONTINUED | OUTPATIENT
Start: 2023-01-08 | End: 2023-01-11 | Stop reason: HOSPADM

## 2023-01-08 RX ORDER — IBUPROFEN 200 MG
24 TABLET ORAL
Status: DISCONTINUED | OUTPATIENT
Start: 2023-01-08 | End: 2023-01-11 | Stop reason: HOSPADM

## 2023-01-08 RX ORDER — LIDOCAINE HYDROCHLORIDE 10 MG/ML
100 INJECTION INFILTRATION; PERINEURAL
Status: COMPLETED | OUTPATIENT
Start: 2023-01-08 | End: 2023-01-08

## 2023-01-08 RX ORDER — METOPROLOL SUCCINATE 50 MG/1
50 TABLET, EXTENDED RELEASE ORAL DAILY
Status: DISCONTINUED | OUTPATIENT
Start: 2023-01-09 | End: 2023-01-11 | Stop reason: HOSPADM

## 2023-01-08 RX ORDER — LINAGLIPTIN 5 MG/1
5 TABLET, FILM COATED ORAL DAILY
COMMUNITY
End: 2023-01-23 | Stop reason: SDUPTHER

## 2023-01-08 RX ORDER — HYDRALAZINE HYDROCHLORIDE 20 MG/ML
20 INJECTION INTRAMUSCULAR; INTRAVENOUS
Status: DISCONTINUED | OUTPATIENT
Start: 2023-01-08 | End: 2023-01-11 | Stop reason: HOSPADM

## 2023-01-08 RX ORDER — ONDANSETRON 2 MG/ML
4 INJECTION INTRAMUSCULAR; INTRAVENOUS EVERY 4 HOURS PRN
Status: DISCONTINUED | OUTPATIENT
Start: 2023-01-08 | End: 2023-01-11 | Stop reason: HOSPADM

## 2023-01-08 RX ORDER — ACETAMINOPHEN 325 MG/1
325 TABLET ORAL EVERY 4 HOURS PRN
Status: DISCONTINUED | OUTPATIENT
Start: 2023-01-08 | End: 2023-01-11 | Stop reason: HOSPADM

## 2023-01-08 RX ORDER — PANTOPRAZOLE SODIUM 40 MG/10ML
40 INJECTION, POWDER, LYOPHILIZED, FOR SOLUTION INTRAVENOUS
Status: COMPLETED | OUTPATIENT
Start: 2023-01-08 | End: 2023-01-08

## 2023-01-08 RX ORDER — ISOSORBIDE MONONITRATE 30 MG/1
30 TABLET, EXTENDED RELEASE ORAL DAILY
Status: DISCONTINUED | OUTPATIENT
Start: 2023-01-08 | End: 2023-01-11 | Stop reason: HOSPADM

## 2023-01-08 RX ORDER — NAPROXEN SODIUM 220 MG/1
81 TABLET, FILM COATED ORAL DAILY
COMMUNITY

## 2023-01-08 RX ADMIN — ISOSORBIDE MONONITRATE 30 MG: 30 TABLET, EXTENDED RELEASE ORAL at 04:01

## 2023-01-08 RX ADMIN — LIDOCAINE HYDROCHLORIDE 100 MG: 10 INJECTION, SOLUTION INFILTRATION; PERINEURAL at 12:01

## 2023-01-08 RX ADMIN — PANTOPRAZOLE SODIUM 40 MG: 40 INJECTION, POWDER, LYOPHILIZED, FOR SOLUTION INTRAVENOUS at 02:01

## 2023-01-08 RX ADMIN — PANTOPRAZOLE SODIUM 8 MG/HR: 40 INJECTION, POWDER, FOR SOLUTION INTRAVENOUS at 09:01

## 2023-01-08 RX ADMIN — IOPAMIDOL 100 ML: 755 INJECTION, SOLUTION INTRAVENOUS at 01:01

## 2023-01-08 RX ADMIN — INSULIN ASPART 1 UNITS: 100 INJECTION, SOLUTION INTRAVENOUS; SUBCUTANEOUS at 09:01

## 2023-01-08 NOTE — Clinical Note
Diagnosis: Lower GI bleed [[id number]]   Admitting Provider:: LISA TURK [[id number]]   Future Attending Provider: DELMA SUNSHINE [[id number]]   Reason for IP Medical Treatment  (Clinical interventions that can only be accomplished in the IP setting? ) :: trending H/H and troponins   Estimated Length of Stay:: 2 midnights   I certify that Inpatient services for greater than or equal to 2 midnights are medically necessary:: Yes   Plans for Post-Acute care--if anticipated (pick the single best option):: A. No post acute care anticipated at this time

## 2023-01-08 NOTE — ED PROVIDER NOTES
Encounter Date: 1/8/2023       History     Chief Complaint   Patient presents with    Rectal Bleeding     Pt to er c/o 4 week hx of intermittent rectal bleeding and falling x 3.     Patient is an 81 yo M presenting with rectal bleeding and feeling lightheaded. First noticed it over the past month, intermittently having grossly bloody output into the toilet. They haven't noticed any definitive stool changes but they are uncertain if the stool is darker. Sometimes it is so much that it drips onto the floor as he is standing. He's also had a few falls over the past month that he has contributed to feeling lightheaded though he has not had any outright syncope. No vomiting. No abdominal pain. He is not on any blood thinners except for daily aspirin. Has  a hx of cardiac bypass. He has no other complaints except for a raised area of his R forearm that they are concerned for an abscess. No chest pain, fevers, dyspnea, leg swelling, or rashes. No urinary complaints.      Review of patient's allergies indicates:   Allergen Reactions    Ramipril      tinnitus    Rosiglitazone      Dyspnea      Rosuvastatin      Past Medical History:   Diagnosis Date    Atrial paroxysmal tachycardia     Chronic kidney disease, stage 3     Coronary arteriosclerosis     Diastolic dysfunction     HTN (hypertension)     Iron deficiency anemia, unspecified     Obstructive sleep apnea syndrome     Prostate cancer     Pure hypercholesterolemia     Type 2 diabetes mellitus without complications      Past Surgical History:   Procedure Laterality Date    CBP      CORONARY ARTERY BYPASS GRAFT (CABG)      x 3    Repair of finger laceration      resection of atypical mole       Family History   Problem Relation Age of Onset    Cancer Mother         cancer -- gastric and liver    Sudden death Mother     Cancer Father     Sudden death Father     Cancer Sister         breast    Breast cancer Sister     Coronary artery disease Paternal Grandfather      Social  History     Tobacco Use    Smoking status: Never    Smokeless tobacco: Never   Substance Use Topics    Alcohol use: Never    Drug use: Never     Review of Systems   Constitutional:  Negative for fever.   HENT:  Negative for sore throat.    Respiratory:  Negative for shortness of breath.    Cardiovascular:  Negative for chest pain.   Gastrointestinal:  Positive for anal bleeding and blood in stool. Negative for nausea.   Genitourinary:  Negative for dysuria.   Musculoskeletal:  Negative for back pain.   Skin:  Negative for rash.   Neurological:  Positive for light-headedness. Negative for weakness.   Hematological:  Does not bruise/bleed easily.     Physical Exam     Initial Vitals [01/08/23 1034]   BP Pulse Resp Temp SpO2   137/64 75 20 97.2 °F (36.2 °C) 96 %      MAP       --         Physical Exam    Nursing note and vitals reviewed.  Constitutional: He appears well-developed and well-nourished. He is not diaphoretic. No distress.   HENT:   Head: Normocephalic and atraumatic.   Eyes: Conjunctivae are normal.   Neck: Neck supple.   Cardiovascular:  Normal rate, regular rhythm and normal heart sounds.           Pulmonary/Chest: Breath sounds normal. No respiratory distress. He has no wheezes. He has no rhonchi.   Abdominal: Abdomen is soft. Bowel sounds are normal. He exhibits no distension. There is no abdominal tenderness. There is no rebound and no guarding.   Genitourinary:    Genitourinary Comments: Old hemmorhoids present on rectal exam there was dark brown stool with some bright red blood.     Musculoskeletal:         General: No edema. Normal range of motion.      Cervical back: Neck supple.     Neurological: He is alert and oriented to person, place, and time.   Skin: Skin is warm and dry.   Superficial abscess over R proximal fore arm   Psychiatric: He has a normal mood and affect. Thought content normal.       ED Course   I & D - Incision and Drainage    Date/Time: 1/8/2023 5:00 PM  Location procedure was  performed: [facility] EMERGENCY DEPARTMENT  Performed by: Bisi Dumont MD  Authorized by: Bisi Dumont MD   Pre-operative diagnosis: abscess  Post-operative diagnosis: abscess  Consent Done: Not Needed  Type: abscess  Body area: upper extremity  Location details: right arm  Anesthesia: local infiltration    Anesthesia:  Local Anesthetic: lidocaine 1% without epinephrine  Anesthetic total: 5 mL  Scalpel size: 11  Incision type: single straight  Complexity: simple  Drainage: pus and bloody  Drainage amount: scant  Wound treatment: wound left open  Complications: No  Patient tolerance: Patient tolerated the procedure well with no immediate complications      Labs Reviewed   COMPREHENSIVE METABOLIC PANEL - Abnormal; Notable for the following components:       Result Value    Chloride 111 (*)     Glucose Level 161 (*)     Blood Urea Nitrogen 43.4 (*)     Creatinine 1.89 (*)     Albumin Level 3.1 (*)     Albumin/Globulin Ratio 0.9 (*)     All other components within normal limits   TROPONIN I - Abnormal; Notable for the following components:    Troponin-I 0.070 (*)     All other components within normal limits   PROTIME-INR - Abnormal; Notable for the following components:    INR 1.05 (*)     All other components within normal limits   URINALYSIS, REFLEX TO URINE CULTURE - Abnormal; Notable for the following components:    Protein, UA 30 (*)     Blood, UA Trace (*)     All other components within normal limits    Narrative:      URINE STABILITY IS 2 HOURS AT ROOM TEMP OR    SIX HOURS REFRIGERATED. PERFORMING TESTING ON    SPECIMENS GREATER THAN THIS AGE MAY AFFECT THE    FOLLOWING TESTS:    PH          SPECIFIC GRAVITY           BLOOD    CLARITY     BILIRUBIN               UROBILINOGEN   CBC WITH DIFFERENTIAL - Abnormal; Notable for the following components:    RBC 3.39 (*)     Hgb 10.2 (*)     Hct 32.4 (*)     MCV 95.6 (*)     MCHC 31.5 (*)     IG# 0.10 (*)     All other components within normal limits   OCCULT BLOOD,  STOOL 2ND SPECIMEN - Abnormal; Notable for the following components:    Occult Blood Stool 2 Positive (*)     All other components within normal limits   APTT - Normal   URINALYSIS, MICROSCOPIC - Normal   CBC W/ AUTO DIFFERENTIAL    Narrative:     The following orders were created for panel order CBC auto differential.  Procedure                               Abnormality         Status                     ---------                               -----------         ------                     CBC with Differential[[id number]]        Abnormal            Final result                 Please view results for these tests on the individual orders.   OCCULT BLOOD,STOOL,DIAGNOSTIC (1-3)    Narrative:     The following orders were created for panel order Occult Blood, Stool, Diagnostic (1-3).  Procedure                               Abnormality         Status                     ---------                               -----------         ------                     Occult blood x 3, stool[[id number]]                                                     Occult Blood, Stool 2nd ...[[id number]]  Abnormal            Final result               Occult Blood, Stool 3rd ...[[id number]]                                                   Please view results for these tests on the individual orders.   OCCULT BLOOD X 3, STOOL   OCCULT BLOOD, STOOL 3RD SPECIMEN     EKG Readings: (Independently Interpreted)   EKG Interpretation 12:32 PM    Rate: 75  Rhythm: NSR  QRS: WNL  Qtc: WNL  1st degree AV block  Non specific T wave abnormalities       Imaging Results              CTA Abdomen and Pelvis (Final result)  Result time 01/08/23 13:53:28      Final result by Derik Faria MD (01/08/23 13:53:28)                   Impression:      1. No evidence for gastrointestinal bleed  2. Other secondary findings as above.      Electronically signed by: Derik Faria MD  Date:    01/08/2023  Time:    13:53               Narrative:    EXAMINATION:  CTA ABDOMEN AND  PELVIS    CLINICAL HISTORY:  GI bleed;    TECHNIQUE:  Multiple cross-sectional images of the abdomen and pelvis were obtained before and after the intravenous administration of contrast.  Coronal and sagittal reformatted images obtained.  An automated dose exposure technique was utilized.  This limits radiation does the patient.    COMPARISON:  None    FINDINGS:  Dependent atelectatic changes lungs are identified.  Heart size enlarged with coronary artery calcifications and likely postsurgical changes of CABG.    The liver, spleen, adrenals, and pancreas are normal.  Kidneys are atretic with Bosniak type 1 cyst.  Gallbladder is present with calcified gallstones.    Small hiatal hernia.  Small bowel is normal caliber with duodenal diverticulum.  Colon is also normal caliber with scattered colonic diverticula.  No adjacent inflammatory changes.  The appendix is not identified.  Involving the gastrointestinal tract no evidence for area contrast extravasation into the lumen is identified.  No evidence for hyperdense material on portal venous phase.  Likely prior clipping of the duodenum is identified.    The course and caliber of the abdominal aorta is normal with scattered calcified atheromatous disease.  Diffuse atheromatous disease of the renal arteries identified as well as of the splanchnic vasculature.    Bladder is enlarged with bladder wall thickening suggestive of chronic bladder outlet obstruction.  No free fluid in the abdomen pelvis.  No evidence for adenopathy.    No suspicious osseous lesions.  Scattered spondylotic changes are identified.  Postsurgical changes of prior sternotomy.                                       Medications   LIDOcaine HCL 10 mg/ml (1%) injection 100 mg (has no administration in time range)   iopamidoL (ISOVUE-370) injection 100 mL (100 mLs Intravenous Given 1/8/23 1330)   pantoprazole injection 40 mg (40 mg Intravenous Given 1/8/23 1425)                 ED Course as of 01/08/23  1441   Sun Jan 08, 2023   1431 Dr. Lowery's service paged. Dr. Coles on call.Plan to admit to monitor H/H, possible GI consult, and trend torponins.  [GM]   1440 8 months ago hemoglobin was 11.9 [GM]   1440 Creatinine stable [GM]      ED Course User Index  [GM] Bisi Dumont MD          Medical Decision Making  Problems Addressed:  Elevated troponin: acute illness or injury     Details: Will trend. May be due to bleed. He is not having any cardiac symptoms at this time beyond lightheadedness. Will not anticoagulate further with active lower GI bleed occuring. Patient takes ASA.  Hematochezia: undiagnosed new problem with uncertain prognosis     Details: Ongoing for the past 4 weeks but getting progressively worse  Lower GI bleed: undiagnosed new problem with uncertain prognosis  Near syncope: undiagnosed new problem with uncertain prognosis    Amount and/or Complexity of Data Reviewed  External Data Reviewed: labs, radiology and ECG.  Labs: ordered. Decision-making details documented in ED Course.  Radiology: ordered. Decision-making details documented in ED Course.  ECG/medicine tests: ordered and independent interpretation performed.  Discussion of management or test interpretation with external provider(s): H/H stable. No acute findings on EKG. Plan to admit for H/H and troponing trends and GI consult, possible cardiac consult.              Clinical Impression:   Final diagnoses:  [R55] Near syncope        ED Disposition Condition    Admit Stable                Bisi Dumont MD  01/11/23 2006

## 2023-01-08 NOTE — H&P
[facility] EMERGENCY DEPARTMENT    Hospital Medicine History & Physical Examination       Patient Name: Damon Moran  MRN: [id number]  Patient Class: IP- Inpatient   Admission Date: 1/8/2023   Admitting Physician: SHAUN Service   Length of Stay: 0  Attending Physician: Onesimo Stroud MD  Primary Care Provider: Renuka Lowery MD  Face-to-Face encounter date: 01/08/2023    Code Status: Not on file    Chief Complaint: Rectal Bleeding (Pt to er c/o 4 week hx of intermittent rectal bleeding and falling x 3.)          HISTORY OF PRESENT ILLNESS:   Damon Moran is a 82 y.o. male who  has a past medical history of Atrial paroxysmal tachycardia, Chronic kidney disease, stage 3, Coronary arteriosclerosis, Diastolic dysfunction, HTN (hypertension), Iron deficiency anemia, unspecified, Obstructive sleep apnea syndrome, Prostate cancer, Pure hypercholesterolemia, and Type 2 diabetes mellitus without complications.. The patient presented to [facility] on 1/8/2023 with a primary complaint of brbpr.  Patient having 1-2x/week brbpr for the last month, for the last several days has been daily, once per day.  Pt has fallen about 4 times in the last month.  Pt has dementia, difficult to discern if is getting dizzy or weakness.  No abdominal pain,n.v, family says appetite is decreased over last several days.  No cp, syncope.  Pt has a red lump on rt elbow from previous falll, mild pain, no drainage curently.    PAST MEDICAL HISTORY:     Past Medical History:   Diagnosis Date    Atrial paroxysmal tachycardia     Chronic kidney disease, stage 3     Coronary arteriosclerosis     Diastolic dysfunction     HTN (hypertension)     Iron deficiency anemia, unspecified     Obstructive sleep apnea syndrome     Prostate cancer     Pure hypercholesterolemia     Type 2 diabetes mellitus without complications        PAST SURGICAL HISTORY:     Past Surgical History:   Procedure Laterality Date    CBP      CORONARY ARTERY  BYPASS GRAFT (CABG)      x 3    Repair of finger laceration      resection of atypical mole         ALLERGIES:   Ramipril, Rosiglitazone, and Rosuvastatin    FAMILY HISTORY:   family history includes Breast cancer in his sister; Cancer in his father, mother, and sister; Coronary artery disease in his paternal grandfather; Sudden death in his father and mother.    SOCIAL HISTORY:     Social History     Tobacco Use    Smoking status: Never    Smokeless tobacco: Never   Substance Use Topics    Alcohol use: Never        HOME MEDICATIONS:     Prior to Admission medications    Medication Sig Start Date End Date Taking? Authorizing Provider   allopurinoL (ZYLOPRIM) 300 MG tablet Take 150 mg by mouth once daily at 6am. 3/6/22  Yes Historical Provider   aspirin 81 MG Chew Take 81 mg by mouth once daily.   Yes Historical Provider   atorvastatin (LIPITOR) 80 MG tablet Take 80 mg by mouth once daily. 3/15/22  Yes Historical Provider   ezetimibe (ZETIA) 10 mg tablet Take 10 mg by mouth once daily. 1/31/22  Yes Historical Provider   glimepiride (AMARYL) 4 MG tablet Take 1 tablet (4 mg total) by mouth daily with breakfast. 9/13/22  Yes Renuka Lowery MD   isosorbide mononitrate (IMDUR) 30 MG 24 hr tablet Take 15 mg by mouth once daily. 2/20/22  Yes Historical Provider   linaGLIPtin (TRADJENTA) 5 mg Tab tablet Take 5 mg by mouth once daily.   Yes Historical Provider   lisinopriL (PRINIVIL,ZESTRIL) 20 MG tablet Take 20 mg by mouth every evening.   Yes Historical Provider   metoprolol succinate (TOPROL-XL) 50 MG 24 hr tablet Take 50 mg by mouth once daily. 4/13/22  Yes Historical Provider   SITagliptin phosphate (JANUVIA) 50 MG Tab Take 1 tablet (50 mg total) by mouth once daily. 12/14/22   Bernadette Hernandez NP       REVIEW OF SYSTEMS:   Except as documented, all other systems reviewed and negative   ROS      PHYSICAL EXAM:     VITAL SIGNS: 24 HRS MIN & MAX LAST   Temp  Min: 97.2 °F (36.2 °C)  Max: 97.2 °F (36.2 °C) 97.2 °F (36.2 °C)    BP  Min: 137/64  Max: 196/87 (!) 196/87     Pulse  Min: 75  Max: 76  75   Resp  Min: 12  Max: 20 18   SpO2  Min: 96 %  Max: 99 % 98 %       General appearance: Obese male in no apparent distress.  HENT: Atraumatic head. Moist mucous membranes of oral cavity.  Eyes: Normal extraocular movements.   Neck: Supple.   Lungs: Clear to auscultation bilaterally. No wheezing present.   Heart: Regular rate and rhythm. S1 and S2 present with no murmurs/gallop/rub.  No JVD present.   Abdomen: Soft, non-distended, non-tender. No rebound tenderness/guarding. Bowel sounds are normal.   Extremities: No cyanosis, clubbing.  1+ LE edema  Skin: No Rash. Rt elbow erythematous swelling fluctuant   Neuro: Motor and sensory exams grossly intact. Good tone.   Psych/mental status: Appropriate mood and affect.     LABS AND IMAGING:     Recent Labs   Lab 01/08/23  1112   WBC 8.0   RBC 3.39*   HGB 10.2*   HCT 32.4*   MCV 95.6*   MCH 30.1   MCHC 31.5*   RDW 14.4      MPV 9.8       Recent Labs   Lab 01/08/23  1111      K 4.8   CO2 24   BUN 43.4*   CREATININE 1.89*   CALCIUM 9.3   ALBUMIN 3.1*   ALKPHOS 104   ALT 54   AST 28   BILITOT 0.4       Microbiology Results (last 7 days)       ** No results found for the last 168 hours. **             CTA Abdomen and Pelvis  Narrative: EXAMINATION:  CTA ABDOMEN AND PELVIS    CLINICAL HISTORY:  GI bleed;    TECHNIQUE:  Multiple cross-sectional images of the abdomen and pelvis were obtained before and after the intravenous administration of contrast.  Coronal and sagittal reformatted images obtained.  An automated dose exposure technique was utilized.  This limits radiation does the patient.    COMPARISON:  None    FINDINGS:  Dependent atelectatic changes lungs are identified.  Heart size enlarged with coronary artery calcifications and likely postsurgical changes of CABG.    The liver, spleen, adrenals, and pancreas are normal.  Kidneys are atretic with Bosniak type 1 cyst.  Gallbladder is  present with calcified gallstones.    Small hiatal hernia.  Small bowel is normal caliber with duodenal diverticulum.  Colon is also normal caliber with scattered colonic diverticula.  No adjacent inflammatory changes.  The appendix is not identified.  Involving the gastrointestinal tract no evidence for area contrast extravasation into the lumen is identified.  No evidence for hyperdense material on portal venous phase.  Likely prior clipping of the duodenum is identified.    The course and caliber of the abdominal aorta is normal with scattered calcified atheromatous disease.  Diffuse atheromatous disease of the renal arteries identified as well as of the splanchnic vasculature.    Bladder is enlarged with bladder wall thickening suggestive of chronic bladder outlet obstruction.  No free fluid in the abdomen pelvis.  No evidence for adenopathy.    No suspicious osseous lesions.  Scattered spondylotic changes are identified.  Postsurgical changes of prior sternotomy.  Impression: 1. No evidence for gastrointestinal bleed  2. Other secondary findings as above.    Electronically signed by: Derik Faria MD  Date:    01/08/2023  Time:    13:53        __________________________________________________________________________  INPATIENT LIST OF MEDICATIONS     Scheduled Meds:   isosorbide mononitrate  30 mg Oral Daily    LIDOcaine HCL 10 mg/ml (1%)  100 mg Intradermal ED 1 Time    [START ON 1/9/2023] metoprolol succinate  50 mg Oral Daily     Continuous Infusions:  PRN Meds:          ASSESSMENT & PLAN:   Lower gib  Rt elbow wound  Htn  Dm  Ckd  Cad/cabg  Dementia    Plan    Clear liquid diet  Home meds/iss  ED MD planning to I&D rt elbow    Gi proph: protonix  Dvt proph: scd          Onesimo Stroud MD   01/08/2023

## 2023-01-09 PROBLEM — K92.1 HEMATOCHEZIA: Status: ACTIVE | Noted: 2023-01-09

## 2023-01-09 PROBLEM — R79.89 ELEVATED TROPONIN: Status: ACTIVE | Noted: 2023-01-09

## 2023-01-09 LAB
GLUCOSE SERPL-MCNC: 104 MG/DL (ref 70–110)
POCT GLUCOSE: 225 MG/DL (ref 70–110)
TROPONIN I SERPL-MCNC: 0.07 NG/ML (ref 0–0.04)
TROPONIN I SERPL-MCNC: 0.09 NG/ML (ref 0–0.04)

## 2023-01-09 PROCEDURE — C9113 INJ PANTOPRAZOLE SODIUM, VIA: HCPCS | Performed by: INTERNAL MEDICINE

## 2023-01-09 PROCEDURE — 63600175 PHARM REV CODE 636 W HCPCS: Performed by: INTERNAL MEDICINE

## 2023-01-09 PROCEDURE — 99233 PR SUBSEQUENT HOSPITAL CARE,LEVL III: ICD-10-PCS | Mod: ,,, | Performed by: INTERNAL MEDICINE

## 2023-01-09 PROCEDURE — 25000003 PHARM REV CODE 250: Performed by: INTERNAL MEDICINE

## 2023-01-09 PROCEDURE — 21400001 HC TELEMETRY ROOM

## 2023-01-09 PROCEDURE — 84484 ASSAY OF TROPONIN QUANT: CPT | Performed by: INTERNAL MEDICINE

## 2023-01-09 PROCEDURE — 99233 SBSQ HOSP IP/OBS HIGH 50: CPT | Mod: ,,, | Performed by: INTERNAL MEDICINE

## 2023-01-09 RX ORDER — ALLOPURINOL 300 MG/1
150 TABLET ORAL DAILY
Status: DISCONTINUED | OUTPATIENT
Start: 2023-01-10 | End: 2023-01-11 | Stop reason: HOSPADM

## 2023-01-09 RX ORDER — ATORVASTATIN CALCIUM 40 MG/1
80 TABLET, FILM COATED ORAL DAILY
Status: DISCONTINUED | OUTPATIENT
Start: 2023-01-10 | End: 2023-01-11 | Stop reason: HOSPADM

## 2023-01-09 RX ORDER — EZETIMIBE 10 MG/1
10 TABLET ORAL DAILY
Status: DISCONTINUED | OUTPATIENT
Start: 2023-01-10 | End: 2023-01-11 | Stop reason: HOSPADM

## 2023-01-09 RX ORDER — PANTOPRAZOLE SODIUM 40 MG/1
40 TABLET, DELAYED RELEASE ORAL DAILY
Status: DISCONTINUED | OUTPATIENT
Start: 2023-01-10 | End: 2023-01-11 | Stop reason: HOSPADM

## 2023-01-09 RX ORDER — LISINOPRIL 20 MG/1
20 TABLET ORAL NIGHTLY
Status: DISCONTINUED | OUTPATIENT
Start: 2023-01-09 | End: 2023-01-11 | Stop reason: HOSPADM

## 2023-01-09 RX ORDER — HYDROCORTISONE ACETATE 25 MG/1
25 SUPPOSITORY RECTAL 2 TIMES DAILY
Status: DISCONTINUED | OUTPATIENT
Start: 2023-01-09 | End: 2023-01-11 | Stop reason: HOSPADM

## 2023-01-09 RX ADMIN — PANTOPRAZOLE SODIUM 8 MG/HR: 40 INJECTION, POWDER, FOR SOLUTION INTRAVENOUS at 02:01

## 2023-01-09 RX ADMIN — PANTOPRAZOLE SODIUM 8 MG/HR: 40 INJECTION, POWDER, FOR SOLUTION INTRAVENOUS at 11:01

## 2023-01-09 RX ADMIN — ISOSORBIDE MONONITRATE 30 MG: 30 TABLET, EXTENDED RELEASE ORAL at 08:01

## 2023-01-09 RX ADMIN — METOPROLOL SUCCINATE 50 MG: 50 TABLET, EXTENDED RELEASE ORAL at 08:01

## 2023-01-09 RX ADMIN — LISINOPRIL 20 MG: 20 TABLET ORAL at 09:01

## 2023-01-09 RX ADMIN — HYDROCORTISONE ACETATE 25 MG: 25 SUPPOSITORY RECTAL at 09:01

## 2023-01-09 RX ADMIN — PANTOPRAZOLE SODIUM 8 MG/HR: 40 INJECTION, POWDER, FOR SOLUTION INTRAVENOUS at 03:01

## 2023-01-09 NOTE — NURSING
Nurses Note -- 4 Eyes      1/9/2023   4:43 PM      Skin assessed during: Admit      [] No Pressure Injuries Present    []Prevention Measures Documented      [x] Yes- Altered Skin Integrity Present or Discovered   [x] LDA Added if Not in Epic (Describe Wound)   [x] New Altered Skin Integrity was Present on Admit and Documented in LDA   [x] Wound Image Taken    Wound Care Consulted? Yes    Attending Nurse:  Charis Olivas LPN     Second RN/Staff Member:  Kierra Martinez RN     '

## 2023-01-09 NOTE — PLAN OF CARE
Condition Code 44 Compete  Secondary review by EHR determination agrees with obs. Team physician agrees. Patient changed to observation.

## 2023-01-10 LAB
ANION GAP SERPL CALC-SCNC: 8 MEQ/L
BASOPHILS # BLD AUTO: 0.06 X10(3)/MCL (ref 0–0.2)
BASOPHILS NFR BLD AUTO: 0.7 %
BUN SERPL-MCNC: 28.3 MG/DL (ref 8.4–25.7)
CALCIUM SERPL-MCNC: 9.3 MG/DL (ref 8.8–10)
CHLORIDE SERPL-SCNC: 108 MMOL/L (ref 98–107)
CO2 SERPL-SCNC: 23 MMOL/L (ref 23–31)
CREAT SERPL-MCNC: 1.65 MG/DL (ref 0.73–1.18)
CREAT/UREA NIT SERPL: 17
EOSINOPHIL # BLD AUTO: 0.37 X10(3)/MCL (ref 0–0.9)
EOSINOPHIL NFR BLD AUTO: 4 %
ERYTHROCYTE [DISTWIDTH] IN BLOOD BY AUTOMATED COUNT: 14.6 % (ref 11.5–17)
FERRITIN SERPL-MCNC: 582.62 NG/ML (ref 21.81–274.66)
GFR SERPLBLD CREATININE-BSD FMLA CKD-EPI: 41 MLS/MIN/1.73/M2
GLUCOSE SERPL-MCNC: 115 MG/DL (ref 82–115)
HCT VFR BLD AUTO: 28.8 % (ref 42–52)
HGB BLD-MCNC: 9.3 GM/DL (ref 14–18)
IMM GRANULOCYTES # BLD AUTO: 0.09 X10(3)/MCL (ref 0–0.04)
IMM GRANULOCYTES NFR BLD AUTO: 1 %
IRON SATN MFR SERPL: 19 % (ref 20–50)
IRON SERPL-MCNC: 33 UG/DL (ref 65–175)
LYMPHOCYTES # BLD AUTO: 1.53 X10(3)/MCL (ref 0.6–4.6)
LYMPHOCYTES NFR BLD AUTO: 16.6 %
MCH RBC QN AUTO: 30 PG
MCHC RBC AUTO-ENTMCNC: 32.3 MG/DL (ref 33–36)
MCV RBC AUTO: 92.9 FL (ref 80–94)
MONOCYTES # BLD AUTO: 1.21 X10(3)/MCL (ref 0.1–1.3)
MONOCYTES NFR BLD AUTO: 13.1 %
NEUTROPHILS # BLD AUTO: 5.96 X10(3)/MCL (ref 2.1–9.2)
NEUTROPHILS NFR BLD AUTO: 64.6 %
NRBC BLD AUTO-RTO: 0 %
PLATELET # BLD AUTO: 264 X10(3)/MCL (ref 130–400)
PMV BLD AUTO: 10.4 FL (ref 7.4–10.4)
POCT GLUCOSE: 121 MG/DL (ref 70–110)
POCT GLUCOSE: 158 MG/DL (ref 70–110)
POCT GLUCOSE: 181 MG/DL (ref 70–110)
POTASSIUM SERPL-SCNC: 4.5 MMOL/L (ref 3.5–5.1)
RBC # BLD AUTO: 3.1 X10(6)/MCL (ref 4.7–6.1)
SODIUM SERPL-SCNC: 139 MMOL/L (ref 136–145)
TIBC SERPL-MCNC: 140 UG/DL (ref 69–240)
TIBC SERPL-MCNC: 173 UG/DL (ref 250–450)
TRANSFERRIN SERPL-MCNC: 156 MG/DL
TROPONIN I SERPL-MCNC: 0.06 NG/ML (ref 0–0.04)
WBC # SPEC AUTO: 9.2 X10(3)/MCL (ref 4.5–11.5)

## 2023-01-10 PROCEDURE — 21400001 HC TELEMETRY ROOM

## 2023-01-10 PROCEDURE — 36415 COLL VENOUS BLD VENIPUNCTURE: CPT | Performed by: INTERNAL MEDICINE

## 2023-01-10 PROCEDURE — 25000003 PHARM REV CODE 250: Performed by: NURSE PRACTITIONER

## 2023-01-10 PROCEDURE — 97162 PT EVAL MOD COMPLEX 30 MIN: CPT

## 2023-01-10 PROCEDURE — 93005 ELECTROCARDIOGRAM TRACING: CPT

## 2023-01-10 PROCEDURE — 85025 COMPLETE CBC W/AUTO DIFF WBC: CPT | Performed by: INTERNAL MEDICINE

## 2023-01-10 PROCEDURE — 84484 ASSAY OF TROPONIN QUANT: CPT | Performed by: INTERNAL MEDICINE

## 2023-01-10 PROCEDURE — 99232 PR SUBSEQUENT HOSPITAL CARE,LEVL II: ICD-10-PCS | Mod: ,,, | Performed by: INTERNAL MEDICINE

## 2023-01-10 PROCEDURE — 82728 ASSAY OF FERRITIN: CPT | Performed by: INTERNAL MEDICINE

## 2023-01-10 PROCEDURE — 25000003 PHARM REV CODE 250: Performed by: INTERNAL MEDICINE

## 2023-01-10 PROCEDURE — 93010 ELECTROCARDIOGRAM REPORT: CPT | Mod: ,,, | Performed by: INTERNAL MEDICINE

## 2023-01-10 PROCEDURE — 83550 IRON BINDING TEST: CPT | Performed by: INTERNAL MEDICINE

## 2023-01-10 PROCEDURE — 63600175 PHARM REV CODE 636 W HCPCS: Performed by: INTERNAL MEDICINE

## 2023-01-10 PROCEDURE — 93010 EKG 12-LEAD: ICD-10-PCS | Mod: ,,, | Performed by: INTERNAL MEDICINE

## 2023-01-10 PROCEDURE — 80048 BASIC METABOLIC PNL TOTAL CA: CPT | Performed by: INTERNAL MEDICINE

## 2023-01-10 PROCEDURE — 97165 OT EVAL LOW COMPLEX 30 MIN: CPT

## 2023-01-10 PROCEDURE — 99232 SBSQ HOSP IP/OBS MODERATE 35: CPT | Mod: ,,, | Performed by: INTERNAL MEDICINE

## 2023-01-10 RX ORDER — LACTOBACILLUS ACIDOPHILUS 500MM CELL
1 CAPSULE ORAL NIGHTLY
Status: DISCONTINUED | OUTPATIENT
Start: 2023-01-10 | End: 2023-01-11 | Stop reason: HOSPADM

## 2023-01-10 RX ADMIN — PANTOPRAZOLE SODIUM 40 MG: 40 TABLET, DELAYED RELEASE ORAL at 09:01

## 2023-01-10 RX ADMIN — METOPROLOL SUCCINATE 50 MG: 50 TABLET, EXTENDED RELEASE ORAL at 09:01

## 2023-01-10 RX ADMIN — DOCUSATE SODIUM 50 MG: 50 CAPSULE, LIQUID FILLED ORAL at 08:01

## 2023-01-10 RX ADMIN — EZETIMIBE 10 MG: 10 TABLET ORAL at 09:01

## 2023-01-10 RX ADMIN — HYDROCORTISONE ACETATE 25 MG: 25 SUPPOSITORY RECTAL at 08:01

## 2023-01-10 RX ADMIN — HYDRALAZINE HYDROCHLORIDE 20 MG: 20 INJECTION INTRAMUSCULAR; INTRAVENOUS at 08:01

## 2023-01-10 RX ADMIN — ATORVASTATIN CALCIUM 80 MG: 40 TABLET, FILM COATED ORAL at 09:01

## 2023-01-10 RX ADMIN — DOCUSATE SODIUM 50 MG: 50 CAPSULE, LIQUID FILLED ORAL at 10:01

## 2023-01-10 RX ADMIN — SITAGLIPTIN 50 MG: 50 TABLET, FILM COATED ORAL at 09:01

## 2023-01-10 RX ADMIN — ISOSORBIDE MONONITRATE 30 MG: 30 TABLET, EXTENDED RELEASE ORAL at 09:01

## 2023-01-10 RX ADMIN — Medication 1 CAPSULE: at 08:01

## 2023-01-10 RX ADMIN — ALLOPURINOL 150 MG: 300 TABLET ORAL at 09:01

## 2023-01-10 RX ADMIN — HYDROCORTISONE ACETATE 25 MG: 25 SUPPOSITORY RECTAL at 09:01

## 2023-01-10 RX ADMIN — LISINOPRIL 20 MG: 20 TABLET ORAL at 08:01

## 2023-01-10 NOTE — PROGRESS NOTES
[facility] - 4th Floor [facility] Medicine  Progress Note    Patient Name: Damon Moran  MRN: [id number]  Patient Class: IP- Inpatient   Admission Date: 1/8/2023  Length of Stay: 1 days  Attending Physician: Renuka Lowery MD  Primary Care Provider: Renuka Lowery MD        Subjective:     Principal Problem:Hematochezia        HPI:  No notes on file    Overview/Hospital Course:  No notes on file    Interval History: Mr. Moran was admitted with BRBPR.  He said its been going on for about a month.  He reports that it happens maybe 3 times per week and he thinks its mixed with the stool.  He hasn't noticed any change in the caliber or consistency or timing of his stool.  He hasn't noticed any since his arrival.  His FOB was + for blood.  He denies constipation.      His troponin was elevated, but he denies chest pain and shortness of breath.      He has no other complaints.  He doesn't every remember having a colonoscopy.    The H&P from Dr. Stroud was reviewed.    Review of Systems  Objective:     Vital Signs (Most Recent):  Temp: 97.8 °F (36.6 °C) (01/09/23 1602)  Pulse: 70 (01/09/23 1602)  Resp: 18 (01/09/23 1602)  BP: (!) 148/68 (01/09/23 1602)  SpO2: 97 % (01/09/23 1602)   Vital Signs (24h Range):  Temp:  [97.8 °F (36.6 °C)] 97.8 °F (36.6 °C)  Pulse:  [61-85] 70  Resp:  [0-23] 18  SpO2:  [93 %-100 %] 97 %  BP: ()/(48-95) 148/68     Weight: 102.4 kg (225 lb 12 oz)  Body mass index is 34.53 kg/m².  No intake or output data in the 24 hours ending 01/09/23 1945   Physical Exam  Vitals reviewed.   Constitutional:       Appearance: Normal appearance.   HENT:      Head: Normocephalic and atraumatic.   Eyes:      Conjunctiva/sclera: Conjunctivae normal.   Cardiovascular:      Rate and Rhythm: Normal rate and regular rhythm.      Pulses: Normal pulses.      Heart sounds: Normal heart sounds. No murmur heard.    No friction rub. No gallop.   Pulmonary:      Effort: Pulmonary effort is  normal. No respiratory distress.      Breath sounds: Normal breath sounds. No wheezing, rhonchi or rales.   Abdominal:      General: Abdomen is flat. There is no distension.      Palpations: Abdomen is soft. There is no mass.      Tenderness: There is no guarding or rebound.   Genitourinary:     Comments: 1 small external hemorrhoid, brown stool in vault, no masses, sl reduced rectal tone  Skin:     General: Skin is warm and dry.   Neurological:      General: No focal deficit present.      Mental Status: He is alert and oriented to person, place, and time.   Psychiatric:         Mood and Affect: Mood normal.         Behavior: Behavior normal.         Thought Content: Thought content normal.         Judgment: Judgment normal.       Significant Labs: All pertinent labs within the past 24 hours have been reviewed.    Significant Imaging: I have reviewed all pertinent imaging results/findings within the past 24 hours.      Assessment/Plan:      * Hematochezia  Hold asa for now.  I suspect this is hemorrhoidal.  The stool is brown.  Recheck hgb in the morning.  If downtrending, will consider GI consult.  Initiate rectal hemorrhoidal suppositories.        Elevated troponin  He isn't having any active cardiac symptoms.  Will recheck troponin and ekg in the am.  Consider cardiology consult.  He sees dr. Gaston routinely.  No lovenox because of hematochezia.      CKD (chronic kidney disease)  Renal function appears to be at baseline.      Iron deficiency anemia    Will check iron studies in the am.    Hyperlipidemia  Cont statin and other meds.      Hypertension  Cont home meds and monitor.      Type 2 diabetes mellitus with stage 3a chronic kidney disease, without long-term current use of insulin  Patient's FSGs are uncontrolled due to hyperglycemia on current medication regimen.  Last A1c reviewed-   Lab Results   Component Value Date    HGBA1C 7.3 (H) 12/12/2022     Most recent fingerstick glucose reviewed-   Recent Labs    Lab 01/08/23  2134   POCTGLUCOSE 225*     Current correctional scale  Medium  Maintain anti-hyperglycemic dose as follows-   Antihyperglycemics (From admission, onward)    Start     Stop Route Frequency Ordered    01/10/23 0900  SITagliptin phosphate tablet 50 mg         -- Oral Daily 01/09/23 1945    01/08/23 1803  insulin aspart U-100 injection 0-5 Units         -- SubQ Before meals & nightly PRN 01/08/23 1704        Hold Oral sulfonylurea while patient is in the hospital.      VTE Risk Mitigation (From admission, onward)         Ordered     Place sequential compression device  Until discontinued         01/08/23 1704                Discharge Planning   KURTIS:      Code Status: Not on file   Is the patient medically ready for discharge?:     Reason for patient still in hospital (select all that apply): Patient trending condition                     Renuka Lowery MD  Department of Hospital Medicine   [facility] - 4th Floor Medical Telemetry

## 2023-01-10 NOTE — ASSESSMENT & PLAN NOTE
Hold asa for now.  I suspect this is hemorrhoidal.  The stool is brown.  Recheck hgb in the morning.  If downtrending, will consider GI consult.  Initiate rectal hemorrhoidal suppositories.

## 2023-01-10 NOTE — PT/OT/SLP EVAL
Physical Therapy Evaluation    Patient Name:  Damon Moran   MRN:  [id number]    Recommendations:     Discharge Recommendations: home health PT   Discharge Equipment Recommendations: none   Barriers to discharge: None    Assessment:     Damon Moran is a 82 y.o. male admitted with a medical diagnosis of Hematochezia. Patient reports living with spouse in [facility]. He occasionally requires assistance with ADL's and ambulates with standard walker. Per chart, patient with multiple falls last month. At time of PT evaluation, patient presents with the following impairments/functional limitations: weakness, impaired endurance, impaired self care skills, impaired functional mobility, gait instability, impaired balance, decreased safety awareness. Patient is impulsive. He required MIN A for bed mobility. Ambulated 115 ft with RW & CGA. Poor safety awareness. He will need HH PT at discharge. Progress patient as tolerated.     Rehab Prognosis: Good; patient would benefit from acute skilled PT services to address these deficits and reach maximum level of function.    Recent Surgery: * No surgery found *      Plan:     During this hospitalization, patient to be seen 3 x/week to address the identified rehab impairments via therapeutic activities, gait training, therapeutic exercises, neuromuscular re-education and progress toward the following goals:    Plan of Care Expires:  02/10/23    Subjective     Chief Complaint: none  Patient/Family Comments/goals: none  Pain/Comfort:  Pain Rating 1: 0/10    Patients cultural, spiritual, Confucianism conflicts given the current situation: no    Living Environment:  Lives with spouse in [facility]  Prior to admission, patient occasionally required assistance with ADL's.  Equipment used at home: walker, standard, raised toilet.  DME owned (not currently used): none.  Upon discharge, patient will have assistance from spouse.    Objective:     Communicated with nurse prior to session.  Patient found supine  with telemetry  upon PT entry to room.    General Precautions: Standard, fall  Orthopedic Precautions:N/A   Braces: N/A  Respiratory Status: Room air    Exams:  Cognitive Exam:  Patient is oriented to Person, Place, Time, and Situation  Sensation:    -       Intact  RLE ROM: WFL  RLE Strength: -4/5 grossly  LLE ROM: WFL  LLE Strength: -4/5 grossly    Functional Mobility:  Bed Mobility:     Supine to Sit: minimum assistance  Transfers:     Sit to Stand:  contact guard assistance with rolling walker  Gait: 115 ft with RW & CGA. Very poor safety awareness  Balance: fair/poor      AM-PAC 6 CLICK MOBILITY  Total Score:19       Patient left up in chair with all lines intact, call button in reach, spouse present, and educated on calling for assistance when ready to return to bed .    GOALS:   Multidisciplinary Problems       Physical Therapy Goals          Problem: Physical Therapy    Goal Priority Disciplines Outcome Goal Variances Interventions   Physical Therapy Goal     PT, PT/OT Ongoing, Progressing     Description: Goals to be met by: 2/10/23     Patient will increase functional independence with mobility by performin. Supine to sit with [place]  2. Sit to supine with [place]  3. Gait  x 400 feet with Modified [place] using Rolling Walker.                          History:     Past Medical History:   Diagnosis Date    Atrial paroxysmal tachycardia     Chronic kidney disease, stage 3     Coronary arteriosclerosis     Diastolic dysfunction     HTN (hypertension)     Iron deficiency anemia, unspecified     Obstructive sleep apnea syndrome     Prostate cancer     Pure hypercholesterolemia     Type 2 diabetes mellitus without complications        Past Surgical History:   Procedure Laterality Date    CBP      CORONARY ARTERY BYPASS GRAFT (CABG)      x 3    Repair of finger laceration      resection of atypical mole         Time Tracking:     PT Received On: 01/10/23  PT Start Time: 902     PT Stop  Time: 0920  PT Total Time (min): 18 min     Billable Minutes: Evaluation 18 minutes      01/10/2023

## 2023-01-10 NOTE — ASSESSMENT & PLAN NOTE
He isn't having any active cardiac symptoms.  Will recheck troponin and ekg in the am.  Consider cardiology consult.  He sees dr. Gaston routinely.

## 2023-01-10 NOTE — PLAN OF CARE
Problem: Physical Therapy  Goal: Physical Therapy Goal  Description: Goals to be met by: 2/10/23     Patient will increase functional independence with mobility by performin. Supine to sit with [place]  2. Sit to supine with [place]  3. Gait  x 400 feet with Modified [place] using Rolling Walker.     Outcome: Ongoing, Progressing

## 2023-01-10 NOTE — PT/OT/SLP EVAL
Occupational Therapy   Evaluation and Discharge Note    Name: Damon Moran  MRN: [id number]  ED due to : rectal bleeding x 4 weeks intermittently, falls x 3   Admitting Diagnosis: Hematochezia, elevated troponin  Med Hx: dementia, tachycardia, CKD, CABG  Recent Surgery: * No surgery found *      Recommendations:     Discharge Recommendations: home  Discharge Equipment Recommendations:    Barriers to discharge:       Assessment:     Damon Moran is a 82 y.o. male with a medical diagnosis of Hematochezia. At this time, patient is functioning at their prior level of function and does not require further acute OT services.     Plan:     During this hospitalization, patient does not require further acute OT services.  Please re-consult if situation changes.    Plan of Care Reviewed with: patient, spouse    Subjective     Chief Complaint: no c/o  Patient/Family Comments/goals: to return home    Occupational Profile:  Living Environment: resides at home with spouse, no steps, walk in tub with seat hand held shower  Previous level of function: pt I with basic ADL tasks except bathing in which spouse assists, pt is never alone, spouse is always supervising, mostly sedentary lifestyle  Roles and Routines:   Equipment Used at home: walker, standard (BSC placed over toilet)  Assistance upon Discharge: spouse able to assist as needed    Pain/Comfort:  Pain Rating 1: 0/10    Patients cultural, spiritual, Yazidism conflicts given the current situation:      Objective:     Communicated with: RN prior to session.  Patient found up in chair with   upon OT entry to room.    General Precautions: Standard, fall  Orthopedic Precautions:    Braces:    Respiratory Status: Room air   HR 80  Occupational Performance:    Bed Mobility:    NT pt sitting uic upon arrival    Functional Mobility/Transfers:  Patient completed Sit <> Stand Transfer with supervision and stand by assistance  with  standard walker   Patient completed Toilet Transfer  Step Transfer technique with supervision and stand by assistance with  standard walker  Functional Mobility: pt ambulated chair > toilet joe 20 ft (and back) with standard walker SBA/supervision    Activities of Daily Living:  Upper Body Dressing: supervision .  Lower Body Dressing: supervision donned B socks, doffed hospital brief and donned pull on brief from home   Toileting: supervision .    Cognitive/Visual Perceptual:  Cognitive/Psychosocial Skills:     -       Oriented to: Person and pt with history of dementia, spouse at bedside reported pt at baseline with orientation, disoriented to place/month/situation   -       Follows Commands/attention:Follows two-step commands  -       Mood/Affect/Coping skills/emotional control: Cooperative and Pleasant    Physical Exam:  Upper Extremity Range of Motion:     -       Right Upper Extremity: WNL  -       Left Upper Extremity: WFL  Upper Extremity Strength:    -       Right Upper Extremity: WNL  -       Left Upper Extremity: WFL although slight weakness compared to R, wife states L UE little weaker due to pt history of fall on L side    AMPAC 6 Click ADL:  [facility] Total Score:      Treatment & Education:  Edcuation on OT dc, spouse agreed, pt functioning at baseline    Patient left up in chair with call button in reach and spouse present    GOALS:   Multidisciplinary Problems       Occupational Therapy Goals       Not on file                    History:     Past Medical History:   Diagnosis Date    Atrial paroxysmal tachycardia     Chronic kidney disease, stage 3     Coronary arteriosclerosis     Diastolic dysfunction     HTN (hypertension)     Iron deficiency anemia, unspecified     Obstructive sleep apnea syndrome     Prostate cancer     Pure hypercholesterolemia     Type 2 diabetes mellitus without complications          Past Surgical History:   Procedure Laterality Date    CBP      CORONARY ARTERY BYPASS GRAFT (CABG)      x 3    Repair of finger laceration       resection of atypical mole         Time Tracking:     OT Date of Treatment:    OT Start Time: 1007  OT Stop Time: 1026  OT Total Time (min): 19 min    Billable Minutes:Evaluation low comp    1/10/2023

## 2023-01-10 NOTE — ASSESSMENT & PLAN NOTE
Patient's FSGs are uncontrolled due to hyperglycemia on current medication regimen.  Last A1c reviewed-   Lab Results   Component Value Date    HGBA1C 7.3 (H) 12/12/2022     Most recent fingerstick glucose reviewed-   Recent Labs   Lab 01/10/23  0436   POCTGLUCOSE 121*     Current correctional scale  Medium  Maintain anti-hyperglycemic dose as follows-   Antihyperglycemics (From admission, onward)    Start     Stop Route Frequency Ordered    01/10/23 0900  SITagliptin phosphate tablet 50 mg         -- Oral Daily 01/09/23 1945    01/08/23 1803  insulin aspart U-100 injection 0-5 Units         -- SubQ Before meals & nightly PRN 01/08/23 1704        Hold Oral sulfonylurea while patient is in the hospital.

## 2023-01-10 NOTE — PLAN OF CARE
Problem: Adult Inpatient Plan of Care  Goal: Plan of Care Review  Outcome: Ongoing, Progressing  Goal: Patient-Specific Goal (Individualized)  Outcome: Ongoing, Progressing  Goal: Absence of Hospital-Acquired Illness or Injury  Outcome: Ongoing, Progressing  Goal: Optimal Comfort and Wellbeing  Outcome: Ongoing, Progressing  Goal: Readiness for Transition of Care  Outcome: Ongoing, Progressing     Problem: Impaired Wound Healing  Goal: Optimal Wound Healing  Outcome: Ongoing, Progressing     Problem: Diabetes Comorbidity  Goal: Blood Glucose Level Within Targeted Range  Outcome: Ongoing, Progressing

## 2023-01-10 NOTE — CONSULTS
Gastroenterology Consultation Note    Reason for Consult:  rectal bleeding    PCP:   Renuka Lowery MD    Referring MD:  Renuka Lowery MD    Hospital Day: 2     Initial History of Present Illness (HPI):  This is a 82 y.o. male unknown to our group with a PMH of EHSAN, prostate cancer, paroxysmal atrial tachycardia, CKD stage III, CAD, HTN, RUPA, HLD, T2DM, dementia. The patient presented to the ED on 01/08/23 with BRBPR for the past month. The frequency of this was up to 3 times per week, but the days prior to ED presentation the patient states it increased to daily. Patient also reports about 4 falls in the past month, but is unsure if it was due to dizziness or weakness. Denies abd pain, n/v, syncope, chest pain. Family does endorse decrease in appetite over the past few days. Upon arrival to the ED, H&H was 10.4/32.8. Rectal exam revealed old hemorrhoids and dark brown stool with some bright red blood. GI consulted for rectal bleeding.    Patient has received rectal hemorrhoidal suppositories. ASA held. Patient reports only one BM since admission that he describes as brown. Patient and wife deny current rectal bleeding. Wife is asking if patient can take probiotics, as he takes this daily at home and this keeps his Bms regular and soft.    Most recent labs:  H&H 9.3/28.8  Iron 33  TIBC 173  Ferritin 582.62  Iron sat 19  INR 1.05    Patient denies previous endoscopic history.    ROS:  Review of Systems   Constitutional:  Negative for malaise/fatigue.   Respiratory:  Negative for cough and shortness of breath.    Cardiovascular:  Negative for chest pain.   Gastrointestinal:  Negative for abdominal pain, blood in stool, constipation, diarrhea, melena, nausea and vomiting.   Neurological:  Negative for weakness.     Medical History:   Past Medical History:   Diagnosis Date    Atrial paroxysmal tachycardia     Chronic kidney disease, stage 3     Coronary arteriosclerosis     Diastolic dysfunction     HTN (hypertension)      Iron deficiency anemia, unspecified     Obstructive sleep apnea syndrome     Prostate cancer     Pure hypercholesterolemia     Type 2 diabetes mellitus without complications        Surgical History:   Past Surgical History:   Procedure Laterality Date    CBP      CORONARY ARTERY BYPASS GRAFT (CABG)      x 3    Repair of finger laceration      resection of atypical mole         Family History:   Family History   Problem Relation Age of Onset    Cancer Mother         cancer -- gastric and liver    Sudden death Mother     Cancer Father     Sudden death Father     Cancer Sister         breast    Breast cancer Sister     Coronary artery disease Paternal Grandfather    .     Social History:   Social History     Tobacco Use    Smoking status: Never    Smokeless tobacco: Never   Substance Use Topics    Alcohol use: Never       Allergies:  Review of patient's allergies indicates:   Allergen Reactions    Ramipril      tinnitus    Rosiglitazone      Dyspnea      Rosuvastatin        Medications Prior to Admission   Medication Sig Dispense Refill Last Dose    allopurinoL (ZYLOPRIM) 300 MG tablet Take 150 mg by mouth once daily at 6am.   1/7/2023    aspirin 81 MG Chew Take 81 mg by mouth once daily.   1/7/2023    atorvastatin (LIPITOR) 80 MG tablet Take 80 mg by mouth once daily.   1/7/2023    ezetimibe (ZETIA) 10 mg tablet Take 10 mg by mouth once daily.   1/7/2023    glimepiride (AMARYL) 4 MG tablet Take 1 tablet (4 mg total) by mouth daily with breakfast. 90 tablet 3 1/7/2023    isosorbide mononitrate (IMDUR) 30 MG 24 hr tablet Take 15 mg by mouth once daily.   1/7/2023    linaGLIPtin (TRADJENTA) 5 mg Tab tablet Take 5 mg by mouth once daily.   1/7/2023    lisinopriL (PRINIVIL,ZESTRIL) 20 MG tablet Take 20 mg by mouth every evening.   1/7/2023    metoprolol succinate (TOPROL-XL) 50 MG 24 hr tablet Take 50 mg by mouth once daily.   1/7/2023    [DISCONTINUED] SITagliptin phosphate (JANUVIA) 50 MG Tab Take 1 tablet (50 mg  "total) by mouth once daily. 30 tablet 5          Objective Findings:    Vital Signs:  BP (!) 145/77   Pulse 77   Temp 98.3 °F (36.8 °C) (Oral)   Resp 18   Ht 5' 7.8" (1.722 m)   Wt 102.4 kg (225 lb 12 oz)   SpO2 95%   BMI 34.53 kg/m²   Body mass index is 34.53 kg/m².    Physical Exam:  Physical Exam  Constitutional:       General: He is not in acute distress.     Appearance: He is obese. He is not ill-appearing.   HENT:      Head: Normocephalic and atraumatic.      Right Ear: External ear normal.      Left Ear: External ear normal.      Nose: Nose normal.      Mouth/Throat:      Pharynx: Oropharynx is clear.   Eyes:      Conjunctiva/sclera: Conjunctivae normal.   Pulmonary:      Effort: Pulmonary effort is normal. No respiratory distress.   Abdominal:      General: Abdomen is flat. Bowel sounds are normal. There is no distension.      Palpations: Abdomen is soft.      Tenderness: There is no abdominal tenderness. There is no guarding.   Musculoskeletal:         General: Normal range of motion.      Cervical back: Normal range of motion.   Skin:     General: Skin is warm and dry.   Neurological:      Mental Status: He is alert. Mental status is at baseline.      Motor: No weakness.   Psychiatric:         Mood and Affect: Mood normal.         Behavior: Behavior normal.       Labs:  Recent Results (from the past 24 hour(s))   Troponin I    Collection Time: 01/09/23 11:52 AM   Result Value Ref Range    Troponin-I 0.087 (H) 0.000 - 0.045 ng/mL   Basic Metabolic Panel    Collection Time: 01/10/23  3:50 AM   Result Value Ref Range    Sodium Level 139 136 - 145 mmol/L    Potassium Level 4.5 3.5 - 5.1 mmol/L    Chloride 108 (H) 98 - 107 mmol/L    Carbon Dioxide 23 23 - 31 mmol/L    Glucose Level 115 82 - 115 mg/dL    Blood Urea Nitrogen 28.3 (H) 8.4 - 25.7 mg/dL    Creatinine 1.65 (H) 0.73 - 1.18 mg/dL    BUN/Creatinine Ratio 17     Calcium Level Total 9.3 8.8 - 10.0 mg/dL    Anion Gap 8.0 mEq/L    eGFR 41 " mls/min/1.73/m2   Troponin I    Collection Time: 01/10/23  3:50 AM   Result Value Ref Range    Troponin-I 0.056 (H) 0.000 - 0.045 ng/mL   Ferritin    Collection Time: 01/10/23  3:50 AM   Result Value Ref Range    Ferritin Level 582.62 (H) 21.81 - 274.66 ng/mL   Iron and TIBC    Collection Time: 01/10/23  3:50 AM   Result Value Ref Range    Iron Binding Capacity Unsaturated 140 69 - 240 ug/dL    Iron Level 33 (L) 65 - 175 ug/dL    Transferrin 156 mg/dL    Iron Binding Capacity Total 173 (L) 250 - 450 ug/dL    Iron Saturation 19 (L) 20 - 50 %   CBC with Differential    Collection Time: 01/10/23  3:50 AM   Result Value Ref Range    WBC 9.2 4.5 - 11.5 x10(3)/mcL    RBC 3.10 (L) 4.70 - 6.10 x10(6)/mcL    Hgb 9.3 (L) 14.0 - 18.0 gm/dL    Hct 28.8 (L) 42.0 - 52.0 %    MCV 92.9 80.0 - 94.0 fL    MCH 30.0 pg    MCHC 32.3 (L) 33.0 - 36.0 mg/dL    RDW 14.6 11.5 - 17.0 %    Platelet 264 130 - 400 x10(3)/mcL    MPV 10.4 7.4 - 10.4 fL    Neut % 64.6 %    Lymph % 16.6 %    Mono % 13.1 %    Eos % 4.0 %    Basophil % 0.7 %    Lymph # 1.53 0.6 - 4.6 x10(3)/mcL    Neut # 5.96 2.1 - 9.2 x10(3)/mcL    Mono # 1.21 0.1 - 1.3 x10(3)/mcL    Eos # 0.37 0 - 0.9 x10(3)/mcL    Baso # 0.06 0 - 0.2 x10(3)/mcL    IG# 0.09 (H) 0 - 0.04 x10(3)/mcL    IG% 1.0 %    NRBC% 0.0 %   POCT glucose    Collection Time: 01/10/23  4:36 AM   Result Value Ref Range    POCT Glucose 121 (H) 70 - 110 mg/dL       CTA Abdomen and Pelvis   Final Result      1. No evidence for gastrointestinal bleed   2. Other secondary findings as above.         Electronically signed by: Derik Faria MD   Date:    01/08/2023   Time:    13:53        Assessment/Plan:  This is a 82 y.o. male unknown to our group with a PMH of EHSAN, prostate cancer, paroxysmal atrial tachycardia, CKD stage III, CAD s/p CABG, HTN, RUPA, HLD, T2DM, dementia. The patient presented to the ED on 01/08/23 with BRBPR for the past month. The frequency of this was up to 3 times per week, but the days prior to ED  presentation the patient states it increased to daily.  Upon arrival to the ED, H&H was 10.4/32.8. Rectal exam revealed old hemorrhoids and dark brown stool with some bright red blood. GI consulted for rectal bleeding.    Rectal bleeding - resolved  H/o EHSAN  - monitor and transfuse as needed to keep Hgb >7  - monitor stool for color and blood  - one brown BM since admission  - continue suppositories  - probiotics, colace ordered  - clear liquid diet  - will discuss inpatient vs outpatient colonoscopy with Dr. Javier, however in the absence of overt GI bleeding we will likely schedule this outpatient    Thank you for allowing us to participate in the care of Damon Moran.    Kallie Vargas NP/Caryn Dailey PA-C acting as scribe for Miguel Javier MD  Gastroenterology  [facility]

## 2023-01-10 NOTE — PLAN OF CARE
01/10/23 1037   Discharge Assessment   Assessment Type Discharge Planning Assessment   Confirmed/corrected address, phone number and insurance Yes   Confirmed Demographics Correct on Facesheet   Source of Information patient;family   Communicated KURTIS with patient/caregiver Date not available/Unable to determine   Reason For Admission Hematochezia   People in Home spouse   Do you expect to return to your current living situation? Yes   Do you have help at home or someone to help you manage your care at home? Yes   Who are your caregiver(s) and their phone number(s)? Chikis Moran (wife) [phone]   Prior to hospitilization cognitive status: Alert/Oriented   Current cognitive status: Alert/Oriented   Walking or Climbing Stairs ambulation difficulty, requires equipment   Mobility Management Walker   Dressing/Bathing bathing difficulty, assistance 1 person;dressing difficulty, assistance 1 person   Home Accessibility stairs to enter home;stairs within home   Number of Stairs, Within Home, Primary none   Number of Stairs, Main Entrance none   Home Layout Able to live on 1st floor   Equipment Currently Used at Home walker, standard;raised toilet   Do you currently have service(s) that help you manage your care at home? Yes   Name and Contact number of agency [facility] [phone]   Is the pt/caregiver preference to resume services with current agency Yes   Do you take prescription medications? Yes   Do you have prescription coverage? Yes   Coverage Wife unsure of name   Do you have any problems affording any of your prescribed medications? No   Is the patient taking medications as prescribed? yes   Who is going to help you get home at discharge? Daughter   How do you get to doctors appointments? family or friend will provide   Are you on dialysis? No   Do you take coumadin? No   Discharge Plan A Home Health   Discharge Plan B Home Health   Discharge Plan discussed with: Patient;Spouse/sig other   OTHER   Name(s)  of People in Home Wife Chikis

## 2023-01-10 NOTE — SUBJECTIVE & OBJECTIVE
Interval History: Mr. Moran was admitted with BRBPR.  He said its been going on for about a month.  He reports that it happens maybe 3 times per week and he thinks its mixed with the stool.  He hasn't noticed any change in the caliber or consistency or timing of his stool.  He hasn't noticed any since his arrival.  His FOB was + for blood.  He denies constipation.      His troponin was elevated, but he denies chest pain and shortness of breath.      He has no other complaints.  He doesn't every remember having a colonoscopy.    The H&P from Dr. Stroud was reviewed.    Review of Systems  Objective:     Vital Signs (Most Recent):  Temp: 97.8 °F (36.6 °C) (01/09/23 1602)  Pulse: 70 (01/09/23 1602)  Resp: 18 (01/09/23 1602)  BP: (!) 148/68 (01/09/23 1602)  SpO2: 97 % (01/09/23 1602)   Vital Signs (24h Range):  Temp:  [97.8 °F (36.6 °C)] 97.8 °F (36.6 °C)  Pulse:  [61-85] 70  Resp:  [0-23] 18  SpO2:  [93 %-100 %] 97 %  BP: ()/(48-95) 148/68     Weight: 102.4 kg (225 lb 12 oz)  Body mass index is 34.53 kg/m².  No intake or output data in the 24 hours ending 01/09/23 1945   Physical Exam  Vitals reviewed.   Constitutional:       Appearance: Normal appearance.   HENT:      Head: Normocephalic and atraumatic.   Eyes:      Conjunctiva/sclera: Conjunctivae normal.   Cardiovascular:      Rate and Rhythm: Normal rate and regular rhythm.      Pulses: Normal pulses.      Heart sounds: Normal heart sounds. No murmur heard.    No friction rub. No gallop.   Pulmonary:      Effort: Pulmonary effort is normal. No respiratory distress.      Breath sounds: Normal breath sounds. No wheezing, rhonchi or rales.   Abdominal:      General: Abdomen is flat. There is no distension.      Palpations: Abdomen is soft. There is no mass.      Tenderness: There is no guarding or rebound.   Genitourinary:     Comments: 1 small external hemorrhoid, brown stool in vault, no masses, sl reduced rectal tone  Skin:     General: Skin is warm and dry.    Neurological:      General: No focal deficit present.      Mental Status: He is alert and oriented to person, place, and time.   Psychiatric:         Mood and Affect: Mood normal.         Behavior: Behavior normal.         Thought Content: Thought content normal.         Judgment: Judgment normal.       Significant Labs: All pertinent labs within the past 24 hours have been reviewed.    Significant Imaging: I have reviewed all pertinent imaging results/findings within the past 24 hours.

## 2023-01-10 NOTE — ASSESSMENT & PLAN NOTE
He isn't having any active cardiac symptoms.  Repeat troponin is trending down.  He will need f/u with Dr. Gaston on this, but cardiology can't really address much until we know the GI bleeding is controlled.  This is likely type II vs. Just a sl elevated troponin from his CKD.

## 2023-01-10 NOTE — ASSESSMENT & PLAN NOTE
Patient's FSGs are uncontrolled due to hyperglycemia on current medication regimen.  Last A1c reviewed-   Lab Results   Component Value Date    HGBA1C 7.3 (H) 12/12/2022     Most recent fingerstick glucose reviewed-   Recent Labs   Lab 01/08/23 2134   POCTGLUCOSE 225*     Current correctional scale  Medium  Maintain anti-hyperglycemic dose as follows-   Antihyperglycemics (From admission, onward)    Start     Stop Route Frequency Ordered    01/10/23 0900  SITagliptin phosphate tablet 50 mg         -- Oral Daily 01/09/23 1945    01/08/23 1803  insulin aspart U-100 injection 0-5 Units         -- SubQ Before meals & nightly PRN 01/08/23 1704        Hold Oral sulfonylurea while patient is in the hospital.

## 2023-01-10 NOTE — PROGRESS NOTES
[facility] - 4th [facility] Medicine  Progress Note    Patient Name: Damon Moran  MRN: [id number]  Patient Class: IP- Inpatient   Admission Date: 1/8/2023  Length of Stay: 2 days  Attending Physician: Renuka Lowery MD  Primary Care Provider: Renuka Lowery MD        Subjective:     Principal Problem:Hematochezia        HPI:  No notes on file    Overview/Hospital Course:  No notes on file    Interval History: No new complaints.  No BM since admit.  He did get the hemorrhoidal suppository last night.    Review of Systems  Objective:     Vital Signs (Most Recent):  Temp: 98.3 °F (36.8 °C) (01/10/23 0804)  Pulse: 77 (01/10/23 0804)  Resp: 18 (01/09/23 1602)  BP: (!) 145/77 (01/10/23 0804)  SpO2: 95 % (01/10/23 0804)   Vital Signs (24h Range):  Temp:  [97.8 °F (36.6 °C)-99.3 °F (37.4 °C)] 98.3 °F (36.8 °C)  Pulse:  [62-83] 77  Resp:  [16-18] 18  SpO2:  [94 %-99 %] 95 %  BP: ()/(48-78) 145/77     Weight: 102.4 kg (225 lb 12 oz)  Body mass index is 34.53 kg/m².    Intake/Output Summary (Last 24 hours) at 1/10/2023 0844  Last data filed at 1/10/2023 0616  Gross per 24 hour   Intake --   Output 300 ml   Net -300 ml      Physical Exam    Significant Labs: All pertinent labs within the past 24 hours have been reviewed.  BMP:   Recent Labs   Lab 01/10/23  0350      K 4.5   CO2 23   BUN 28.3*   CREATININE 1.65*   CALCIUM 9.3     CBC:   Recent Labs   Lab 01/08/23  1112 01/08/23  1646 01/10/23  0350   WBC 8.0  --  9.2   HGB 10.2* 10.4* 9.3*   HCT 32.4* 32.8* 28.8*     --  264       Significant Imaging: I have reviewed all pertinent imaging results/findings within the past 24 hours.      Assessment/Plan:      * Hematochezia  Hold asa for now.  I suspect this is hemorrhoidal.  The stool is brown.  GI consult -- he's never had a colonoscopy.  Cont to trend h/h.  Initiated rectal hemorrhoidal suppositories.        Elevated troponin  He isn't having any active cardiac symptoms.   Repeat troponin is trending down.  He will need f/u with Dr. Gaston on this, but cardiology can't really address much until we know the GI bleeding is controlled.  This is likely type II vs. Just a sl elevated troponin from his CKD.    CKD (chronic kidney disease)  Renal function appears to be at baseline.      Iron deficiency anemia    Ferritin is elevated -- could be inflammatory.  TIBC low.      Hyperlipidemia  Cont statin and other meds.      Hypertension  Cont home meds and monitor.      Type 2 diabetes mellitus with stage 3a chronic kidney disease, without long-term current use of insulin  Patient's FSGs are uncontrolled due to hyperglycemia on current medication regimen.  Last A1c reviewed-   Lab Results   Component Value Date    HGBA1C 7.3 (H) 12/12/2022     Most recent fingerstick glucose reviewed-   Recent Labs   Lab 01/10/23  0436   POCTGLUCOSE 121*     Current correctional scale  Medium  Maintain anti-hyperglycemic dose as follows-   Antihyperglycemics (From admission, onward)    Start     Stop Route Frequency Ordered    01/10/23 0900  SITagliptin phosphate tablet 50 mg         -- Oral Daily 01/09/23 1945    01/08/23 1803  insulin aspart U-100 injection 0-5 Units         -- SubQ Before meals & nightly PRN 01/08/23 1704        Hold Oral sulfonylurea while patient is in the hospital.      VTE Risk Mitigation (From admission, onward)         Ordered     Place sequential compression device  Until discontinued         01/08/23 1704                Discharge Planning   KURTIS:      Code Status: Not on file   Is the patient medically ready for discharge?:     Reason for patient still in hospital (select all that apply): Patient trending condition                     Renuka Lowery MD  Department of Hospital Medicine   [facility] - 4th Floor Medical Telemetry

## 2023-01-10 NOTE — NURSING
[facility] 4th Floor Medical Telemetry  Wound Care    Patient Name:  Damon Moran   MRN:  [id number]  Date: 1/10/2023  Diagnosis: Hematochezia    History:     Past Medical History:   Diagnosis Date    Atrial paroxysmal tachycardia     Chronic kidney disease, stage 3     Coronary arteriosclerosis     Diastolic dysfunction     HTN (hypertension)     Iron deficiency anemia, unspecified     Obstructive sleep apnea syndrome     Prostate cancer     Pure hypercholesterolemia     Type 2 diabetes mellitus without complications        Social History     Socioeconomic History    Marital status:    Tobacco Use    Smoking status: Never    Smokeless tobacco: Never   Substance and Sexual Activity    Alcohol use: Never    Drug use: Never    Sexual activity: Not Currently       Precautions:     Allergies as of 01/08/2023 - Reviewed 01/08/2023   Allergen Reaction Noted    Ramipril  06/14/2022    Rosiglitazone  06/14/2022    Rosuvastatin  06/14/2022       WOC Assessment Details/Treatment   Patient and wife states that wound was result of fall that resulted in I&D by primary physician.       01/10/23 1030   WOCN Assessment   WOCN Total Time (mins) 45   Visit Date 01/10/23   Visit Time 1030   Consult Type New   WOCN Speciality Wound   Wound I&D   Number of Wounds 1   Intervention assessed;applied;chart review   Positioning   Body Position position changed independently        Altered Skin Integrity 01/09/23 1602 Right posterior Elbow Abscess   Date First Assessed/Time First Assessed: 01/09/23 1602   Altered Skin Integrity Present on Admission: yes  Side: Right  Orientation: posterior  Location: Elbow  Primary Wound Type: Abscess   Wound Image    Dressing Appearance Intact   Drainage Characteristics/Odor Purulent   Appearance Hypergranulation   Tissue loss description Full thickness   Red (%), Wound Tissue Color 100 %   Periwound Area Dry   Wound Edges Defined   Wound Length (cm) 0.5 cm   Wound Width (cm) 0.4 cm    Wound Depth (cm) 1 cm   Wound Volume (cm^3) 0.2 cm^3   Wound Surface Area (cm^2) 0.2 cm^2   Care Cleansed with:  (Vashe)   Dressing Applied  (Gauze, cloth tape)      (Insert flowsheet data here)    Recommendations made to primary team for wound care of cleansing with Vashe and covering with gauze and secure with clothe tape daily . Orders placed.     01/10/2023

## 2023-01-10 NOTE — SUBJECTIVE & OBJECTIVE
Interval History: No new complaints.  No BM since admit.  He did get the hemorrhoidal suppository last night.    Review of Systems  Objective:     Vital Signs (Most Recent):  Temp: 98.3 °F (36.8 °C) (01/10/23 0804)  Pulse: 77 (01/10/23 0804)  Resp: 18 (01/09/23 1602)  BP: (!) 145/77 (01/10/23 0804)  SpO2: 95 % (01/10/23 0804)   Vital Signs (24h Range):  Temp:  [97.8 °F (36.6 °C)-99.3 °F (37.4 °C)] 98.3 °F (36.8 °C)  Pulse:  [62-83] 77  Resp:  [16-18] 18  SpO2:  [94 %-99 %] 95 %  BP: ()/(48-78) 145/77     Weight: 102.4 kg (225 lb 12 oz)  Body mass index is 34.53 kg/m².    Intake/Output Summary (Last 24 hours) at 1/10/2023 0844  Last data filed at 1/10/2023 0616  Gross per 24 hour   Intake --   Output 300 ml   Net -300 ml      Physical Exam    Significant Labs: All pertinent labs within the past 24 hours have been reviewed.  BMP:   Recent Labs   Lab 01/10/23  0350      K 4.5   CO2 23   BUN 28.3*   CREATININE 1.65*   CALCIUM 9.3     CBC:   Recent Labs   Lab 01/08/23  1112 01/08/23  1646 01/10/23  0350   WBC 8.0  --  9.2   HGB 10.2* 10.4* 9.3*   HCT 32.4* 32.8* 28.8*     --  264       Significant Imaging: I have reviewed all pertinent imaging results/findings within the past 24 hours.

## 2023-01-10 NOTE — ASSESSMENT & PLAN NOTE
Hold asa for now.  I suspect this is hemorrhoidal.  The stool is brown.  GI consult.  Initiated rectal hemorrhoidal suppositories.

## 2023-01-11 VITALS
DIASTOLIC BLOOD PRESSURE: 63 MMHG | WEIGHT: 225.75 LBS | SYSTOLIC BLOOD PRESSURE: 133 MMHG | OXYGEN SATURATION: 98 % | RESPIRATION RATE: 18 BRPM | HEIGHT: 68 IN | BODY MASS INDEX: 34.21 KG/M2 | TEMPERATURE: 98 F | HEART RATE: 60 BPM

## 2023-01-11 LAB
BASOPHILS # BLD AUTO: 0.05 X10(3)/MCL (ref 0–0.2)
BASOPHILS NFR BLD AUTO: 0.7 %
EOSINOPHIL # BLD AUTO: 0.41 X10(3)/MCL (ref 0–0.9)
EOSINOPHIL NFR BLD AUTO: 5.8 %
ERYTHROCYTE [DISTWIDTH] IN BLOOD BY AUTOMATED COUNT: 14.3 % (ref 11.5–17)
HCT VFR BLD AUTO: 29.7 % (ref 42–52)
HGB BLD-MCNC: 9.5 GM/DL (ref 14–18)
IMM GRANULOCYTES # BLD AUTO: 0.08 X10(3)/MCL (ref 0–0.04)
IMM GRANULOCYTES NFR BLD AUTO: 1.1 %
LYMPHOCYTES # BLD AUTO: 1.4 X10(3)/MCL (ref 0.6–4.6)
LYMPHOCYTES NFR BLD AUTO: 19.7 %
MCH RBC QN AUTO: 30 PG
MCHC RBC AUTO-ENTMCNC: 32 MG/DL (ref 33–36)
MCV RBC AUTO: 93.7 FL (ref 80–94)
MONOCYTES # BLD AUTO: 0.92 X10(3)/MCL (ref 0.1–1.3)
MONOCYTES NFR BLD AUTO: 13 %
NEUTROPHILS # BLD AUTO: 4.24 X10(3)/MCL (ref 2.1–9.2)
NEUTROPHILS NFR BLD AUTO: 59.7 %
NRBC BLD AUTO-RTO: 0 %
PLATELET # BLD AUTO: 253 X10(3)/MCL (ref 130–400)
PMV BLD AUTO: 10.1 FL (ref 7.4–10.4)
POCT GLUCOSE: 136 MG/DL (ref 70–110)
POCT GLUCOSE: 162 MG/DL (ref 70–110)
POCT GLUCOSE: 212 MG/DL (ref 70–110)
RBC # BLD AUTO: 3.17 X10(6)/MCL (ref 4.7–6.1)
WBC # SPEC AUTO: 7.1 X10(3)/MCL (ref 4.5–11.5)

## 2023-01-11 PROCEDURE — 99238 PR HOSPITAL DISCHARGE DAY,<30 MIN: ICD-10-PCS | Mod: ,,, | Performed by: INTERNAL MEDICINE

## 2023-01-11 PROCEDURE — 25000003 PHARM REV CODE 250: Performed by: NURSE PRACTITIONER

## 2023-01-11 PROCEDURE — 25000003 PHARM REV CODE 250: Performed by: INTERNAL MEDICINE

## 2023-01-11 PROCEDURE — 97116 GAIT TRAINING THERAPY: CPT | Mod: CQ

## 2023-01-11 PROCEDURE — 97110 THERAPEUTIC EXERCISES: CPT | Mod: CQ

## 2023-01-11 PROCEDURE — 85025 COMPLETE CBC W/AUTO DIFF WBC: CPT | Performed by: INTERNAL MEDICINE

## 2023-01-11 PROCEDURE — 36415 COLL VENOUS BLD VENIPUNCTURE: CPT | Performed by: INTERNAL MEDICINE

## 2023-01-11 PROCEDURE — 99238 HOSP IP/OBS DSCHRG MGMT 30/<: CPT | Mod: ,,, | Performed by: INTERNAL MEDICINE

## 2023-01-11 PROCEDURE — 63600175 PHARM REV CODE 636 W HCPCS: Performed by: INTERNAL MEDICINE

## 2023-01-11 RX ORDER — LACTOBACILLUS ACIDOPHILUS 500MM CELL
1 CAPSULE ORAL NIGHTLY
Qty: 30 CAPSULE | Refills: 3 | Status: SHIPPED | OUTPATIENT
Start: 2023-01-11

## 2023-01-11 RX ORDER — HYDROCORTISONE ACETATE 25 MG/1
25 SUPPOSITORY RECTAL 2 TIMES DAILY
Qty: 10 SUPPOSITORY | Refills: 0 | Status: SHIPPED | OUTPATIENT
Start: 2023-01-11 | End: 2023-01-16

## 2023-01-11 RX ORDER — LISINOPRIL 40 MG/1
40 TABLET ORAL DAILY
Qty: 30 TABLET | Refills: 6 | Status: SHIPPED | OUTPATIENT
Start: 2023-01-11 | End: 2023-01-23 | Stop reason: SDUPTHER

## 2023-01-11 RX ADMIN — HYDROCORTISONE ACETATE 25 MG: 25 SUPPOSITORY RECTAL at 08:01

## 2023-01-11 RX ADMIN — DOCUSATE SODIUM 50 MG: 50 CAPSULE, LIQUID FILLED ORAL at 08:01

## 2023-01-11 RX ADMIN — ISOSORBIDE MONONITRATE 30 MG: 30 TABLET, EXTENDED RELEASE ORAL at 08:01

## 2023-01-11 RX ADMIN — PANTOPRAZOLE SODIUM 40 MG: 40 TABLET, DELAYED RELEASE ORAL at 08:01

## 2023-01-11 RX ADMIN — METOPROLOL SUCCINATE 50 MG: 50 TABLET, EXTENDED RELEASE ORAL at 08:01

## 2023-01-11 RX ADMIN — INSULIN ASPART 2 UNITS: 100 INJECTION, SOLUTION INTRAVENOUS; SUBCUTANEOUS at 12:01

## 2023-01-11 RX ADMIN — ATORVASTATIN CALCIUM 80 MG: 40 TABLET, FILM COATED ORAL at 08:01

## 2023-01-11 RX ADMIN — SITAGLIPTIN 50 MG: 50 TABLET, FILM COATED ORAL at 08:01

## 2023-01-11 RX ADMIN — ALLOPURINOL 150 MG: 300 TABLET ORAL at 08:01

## 2023-01-11 RX ADMIN — EZETIMIBE 10 MG: 10 TABLET ORAL at 08:01

## 2023-01-11 NOTE — HOSPITAL COURSE
Mr. Moran was monitored for ongoing s/sx of bleeding.  He didn't have any more hematochezia during his stay.  He was started on a probiotic, stool softener and hemorrhoidal suppositories bid.  His troponin was mildly elevated, but trended down.  He never did have any chest pain, shortness of breath or other anginal symptoms during his stay.  He sees Dr. Gaston chronically, and I've recommended he f/u with him regarding the mildly elevated troponins during this stay.  He wasn't anticoagulated because of the hematochezia.  But I did resume the baby asa upon discharge.  GI did see him and they plan on an outpatient colonoscpy.  His BP was elevated during his stay.  I've increased his lisinopril and will f/u on the pressures at home upon his return to clinic.  His HGB remained relatively stable in the 9-10 range.  There were no other issues during his stay, and he is being discharged in stable condition.

## 2023-01-11 NOTE — PT/OT/SLP PROGRESS
Physical Therapy Treatment    Patient Name:  Damon Moran   MRN:  [id number]    Recommendations:     Discharge Recommendations: home health PT  Discharge Equipment Recommendations: none  Barriers to discharge:  medical status    Assessment:     Damon Moran is a 82 y.o. male admitted with a medical diagnosis of Hematochezia.  He presents with the following impairments/functional limitations: weakness, impaired endurance, impaired functional mobility, gait instability, impaired balance .    Rehab Prognosis: Good; patient would benefit from acute skilled PT services to address these deficits and reach maximum level of function.    Recent Surgery: * No surgery found *      Plan:     During this hospitalization, patient to be seen 3 x/week (3-5x/week) to address the identified rehab impairments via gait training, therapeutic activities, therapeutic exercises, neuromuscular re-education and progress toward the following goals:    Plan of Care Expires:  02/10/23    Subjective     Chief Complaint: Pt had no complaints today.  Patient/Family Comments/goals:   Pain/Comfort:         Objective:     Communicated with NSG prior to session.  Patient found HOB elevated with telemetry and spouse at bedside upon PT entry to room.     General Precautions: Standard, fall  Orthopedic Precautions: N/A  Braces: N/A  Respiratory Status: Room air  Prior to mobility: 120/59 BP, 64 HR  After mobility: 128/57 BP, 60 HR     Functional Mobility:  Bed Mobility:     Scooting: stand by assistance  Supine to Sit: stand by assistance  Transfers:     Sit to Stand:  contact guard assistance with rolling walker  Gait: Pt amb ~125ft using RW at CGA with step through gait pattern. No LOB noted however still unsteady.  Balance: good dynamic standing      AM-PAC 6 CLICK MOBILITY          Treatment & Education:  Performed seated hip flex and LAQ, 2x10 reps with cues to engage core and maintain upright posture. Posterior lean noted however tolerated therx  well. Denied pain, weakness, fatigue, LH, dizziness throughout treatment.     Patient left up in chair with all lines intact, call button in reach, and spouse present..    GOALS:   Multidisciplinary Problems       Physical Therapy Goals          Problem: Physical Therapy    Goal Priority Disciplines Outcome Goal Variances Interventions   Physical Therapy Goal     PT, PT/OT Ongoing, Progressing     Description: Goals to be met by: 2/10/23     Patient will increase functional independence with mobility by performin. Supine to sit with Bicknell  2. Sit to supine with Bicknell  3. Gait  x 400 feet with Modified Bicknell using Rolling Walker.                          Time Tracking:     PT Received On:    PT Start Time: 947     PT Stop Time: 1013  PT Total Time (min): 26 min     Billable Minutes: Gait Training 13 and Therapeutic Exercise 13    Treatment Type: Treatment  PT/PTA: PTA     PTA Visit Number: 2023

## 2023-01-11 NOTE — DISCHARGE SUMMARY
[facility] - 4th [facility] Medicine  Discharge Summary      Patient Name: Damon Moran  MRN: [id number]  SAUNDRA: [id number]  Patient Class: IP- Inpatient  Admission Date: 1/8/2023  Hospital Length of Stay: 3 days  Discharge Date and Time:  01/11/2023 11:58 AM  Attending Physician: Renuka Lowery MD   Discharging Provider: Renuka Lowery MD  Primary Care Provider: Renuka Lowery MD    Primary Care Team: Networked reference to record PCT     HPI:   No notes on file    * No surgery found *      Hospital Course:   Mr. Moran was monitored for ongoing s/sx of bleeding.  He didn't have any more hematochezia during his stay.  He was started on a probiotic, stool softener and hemorrhoidal suppositories bid.  His troponin was mildly elevated, but trended down.  He never did have any chest pain, shortness of breath or other anginal symptoms during his stay.  He sees Dr. Gaston chronically, and I've recommended he f/u with him regarding the mildly elevated troponins during this stay.  He wasn't anticoagulated because of the hematochezia.  But I did resume the baby asa upon discharge.  GI did see him and they plan on an outpatient colonoscpy.  His BP was elevated during his stay.  I've increased his lisinopril and will f/u on the pressures at home upon his return to clinic.  His HGB remained relatively stable in the 9-10 range.  There were no other issues during his stay, and he is being discharged in stable condition.       Goals of Care Treatment Preferences:         Consults:   Consults (From admission, onward)        Status Ordering Provider     Inpatient consult to Gastroenterology  Once        Provider:  Miguel Javier MD    Completed RENUKA LOWERY          No new Assessment & Plan notes have been filed under this hospital service since the last note was generated.  Service: Hospital Medicine    Final Active Diagnoses:    Diagnosis Date Noted POA    PRINCIPAL PROBLEM:  Hematochezia  [K92.1] 01/09/2023 Yes    Elevated troponin [R77.8] 01/09/2023 Yes    Lower GI bleed [K92.2] 01/08/2023 Yes    CKD (chronic kidney disease) [N18.9] 08/11/2022 Yes    Type 2 diabetes mellitus with stage 3a chronic kidney disease, without long-term current use of insulin [E11.22, N18.31] 06/16/2022 Yes    Hypertension [I10] 06/16/2022 Yes    Atrial paroxysmal tachycardia [I47.1] 06/16/2022 Yes    Hyperlipidemia [E78.5] 06/16/2022 Yes    CAD (coronary artery disease) [I25.10] 06/16/2022 Yes    RUPA on CPAP [G47.33, Z99.89] 06/16/2022 Not Applicable    Iron deficiency anemia [D50.9] 06/16/2022 Yes      Problems Resolved During this Admission:       Discharged Condition: stable    Disposition: Home or Self Care    Follow Up:   Follow-up Information     [facility] Follow up.    Specialties: Home Health Services, Home Therapy Services, Home Living Aide Services  Why: This is your home health provider. You may contact the office for any questions or concerns regarding your home health services.  Contact information:  [address] [address]  [phone]           Miguel Javier MD Follow up in 1 week(s).    Specialty: Gastroenterology  Why: Patient needs colonoscopy, hosp f/u.  Contact information:  [address] [facility]  [address] [place] [address]  [phone]             Renuka Lowery MD Follow up in 2 week(s).    Specialty: Internal Medicine  Contact information:  [address] [address]  [phone]                       Patient Instructions:      Diet diabetic     Activity as tolerated       Significant Diagnostic Studies: Labs:   CBC   Recent Labs   Lab 01/10/23  0350 01/11/23  0346   WBC 9.2 7.1   HGB 9.3* 9.5*   HCT 28.8* 29.7*    253   , Troponin   Recent Labs   Lab 01/09/23  0447 01/09/23  1152 01/10/23  0350   TROPONINI 0.073* 0.087* 0.056*    and All labs within the past 24 hours have been reviewed    Pending  Diagnostic Studies:     Procedure Component Value Units Date/Time    Occult Blood, Stool 3rd Specimen [[id number]]     Order Status: Sent Lab Status: No result     Specimen: Stool     Occult Blood, Stool, Diagnostic (1-3) [[id number]]  (Abnormal) Collected: 01/08/23 1140    Order Status: Sent Lab Status: In process Updated: 01/09/23 0704    Specimen: Stool     Narrative:      The following orders were created for panel order Occult Blood, Stool, Diagnostic (1-3).  Procedure                               Abnormality         Status                     ---------                               -----------         ------                     Occult blood x 3, stool[[id number]]                                                     Occult Blood, Stool 2nd ...[[id number]]  Abnormal            Final result               Occult Blood, Stool 3rd ...[[id number]]                                                   Please view results for these tests on the individual orders.    Occult blood x 3, stool [[id number]]     Order Status: Sent Lab Status: No result     Specimen: Stool          Medications:  Reconciled Home Medications:      Medication List      START taking these medications    docusate sodium 50 MG capsule  Commonly known as: COLACE  Take 1 capsule (50 mg total) by mouth 2 (two) times daily.     hydrocortisone 25 mg suppository  Commonly known as: ANUSOL-HC  Place 1 suppository (25 mg total) rectally 2 (two) times daily. for 5 days     Lactobacillus acidophilus 500 million cell Cap  Take 1 capsule by mouth nightly.        CHANGE how you take these medications    lisinopriL 40 MG tablet  Commonly known as: PRINIVIL,ZESTRIL  Take 1 tablet (40 mg total) by mouth once daily.  What changed:   · medication strength  · how much to take  · when to take this        CONTINUE taking these medications    allopurinoL 300 MG tablet  Commonly known as: ZYLOPRIM  Take 150 mg by mouth once daily at 6am.     aspirin 81 MG Chew  Take 81 mg by mouth  once daily.     atorvastatin 80 MG tablet  Commonly known as: LIPITOR  Take 80 mg by mouth once daily.     ezetimibe 10 mg tablet  Commonly known as: ZETIA  Take 10 mg by mouth once daily.     glimepiride 4 MG tablet  Commonly known as: AMARYL  Take 1 tablet (4 mg total) by mouth daily with breakfast.     isosorbide mononitrate 30 MG 24 hr tablet  Commonly known as: IMDUR  Take 15 mg by mouth once daily.     metoprolol succinate 50 MG 24 hr tablet  Commonly known as: TOPROL-XL  Take 50 mg by mouth once daily.     TRADJENTA 5 mg Tab tablet  Generic drug: linaGLIPtin  Take 5 mg by mouth once daily.            Indwelling Lines/Drains at time of discharge:   Lines/Drains/Airways     None                 Time spent on the discharge of patient: 25 minutes         Renuka Lowery MD  Department of Hospital Medicine  [facility] - 4th Floor Medical Telemetry

## 2023-01-11 NOTE — PROGRESS NOTES
"Gastroenterology Progress Note    Subjective/Interval History:  H&H 9.5/29.7 - stable    Patient resting comfortably in bed. Denies BM or rectal bleeding, abd pain. He is ready to go home. Agreeable to outpatient colonoscopy.    ROS:  Review of Systems   Constitutional:  Negative for malaise/fatigue.   Respiratory:  Negative for cough and shortness of breath.    Cardiovascular:  Negative for chest pain.   Gastrointestinal:  Positive for constipation. Negative for abdominal pain, blood in stool, diarrhea, melena, nausea and vomiting.   Neurological:  Negative for weakness.     Vital Signs:  BP (!) 180/76   Pulse 69   Temp 98.8 °F (37.1 °C)   Resp 18   Ht 5' 7.8" (1.722 m)   Wt 102.4 kg (225 lb 12 oz)   SpO2 (!) 94%   BMI 34.53 kg/m²   Body mass index is 34.53 kg/m².    Physical Exam:  Physical Exam  Constitutional:       General: He is not in acute distress.     Appearance: He is obese. He is not ill-appearing.   HENT:      Head: Normocephalic and atraumatic.      Right Ear: External ear normal.      Left Ear: External ear normal.      Nose: Nose normal.      Mouth/Throat:      Pharynx: Oropharynx is clear.   Eyes:      Conjunctiva/sclera: Conjunctivae normal.   Pulmonary:      Effort: Pulmonary effort is normal. No respiratory distress.   Abdominal:      General: Bowel sounds are normal. There is no distension.      Palpations: Abdomen is soft.      Tenderness: There is no abdominal tenderness. There is no guarding.   Skin:     General: Skin is warm and dry.      Coloration: Skin is not pale.   Neurological:      Mental Status: He is alert and oriented to person, place, and time. Mental status is at baseline.      Motor: No weakness.   Psychiatric:         Mood and Affect: Mood normal.         Behavior: Behavior normal.         Thought Content: Thought content normal.       Labs:  Recent Results (from the past 24 hour(s))   POCT glucose    Collection Time: 01/10/23 11:41 AM   Result Value Ref Range    POCT " Glucose 181 (H) 70 - 110 mg/dL   POCT glucose    Collection Time: 01/10/23  5:04 PM   Result Value Ref Range    POCT Glucose 162 (H) 70 - 110 mg/dL   POCT glucose    Collection Time: 01/10/23  9:33 PM   Result Value Ref Range    POCT Glucose 158 (H) 70 - 110 mg/dL   CBC with Differential    Collection Time: 01/11/23  3:46 AM   Result Value Ref Range    WBC 7.1 4.5 - 11.5 x10(3)/mcL    RBC 3.17 (L) 4.70 - 6.10 x10(6)/mcL    Hgb 9.5 (L) 14.0 - 18.0 gm/dL    Hct 29.7 (L) 42.0 - 52.0 %    MCV 93.7 80.0 - 94.0 fL    MCH 30.0 pg    MCHC 32.0 (L) 33.0 - 36.0 mg/dL    RDW 14.3 11.5 - 17.0 %    Platelet 253 130 - 400 x10(3)/mcL    MPV 10.1 7.4 - 10.4 fL    Neut % 59.7 %    Lymph % 19.7 %    Mono % 13.0 %    Eos % 5.8 %    Basophil % 0.7 %    Lymph # 1.40 0.6 - 4.6 x10(3)/mcL    Neut # 4.24 2.1 - 9.2 x10(3)/mcL    Mono # 0.92 0.1 - 1.3 x10(3)/mcL    Eos # 0.41 0 - 0.9 x10(3)/mcL    Baso # 0.05 0 - 0.2 x10(3)/mcL    IG# 0.08 (H) 0 - 0.04 x10(3)/mcL    IG% 1.1 %    NRBC% 0.0 %   POCT glucose    Collection Time: 01/11/23  6:15 AM   Result Value Ref Range    POCT Glucose 136 (H) 70 - 110 mg/dL         Assessment/Plan:  This is a 82 y.o. male unknown to our group with a PMH of EHSAN, prostate cancer, paroxysmal atrial tachycardia, CKD stage III, CAD s/p CABG, HTN, RUPA, HLD, T2DM, dementia. The patient presented to the ED on 01/08/23 with BRBPR for the past month. The frequency of this was up to 3 times per week, but the days prior to ED presentation the patient states it increased to daily.  Upon arrival to the ED, H&H was 10.4/32.8. Rectal exam revealed old hemorrhoids and dark brown stool with some bright red blood. GI consulted for rectal bleeding.     Rectal bleeding - resolved  H/o EHSAN  - monitor and transfuse as needed to keep Hgb >7  - monitor stool for color and blood  - one brown BM since admission  - continue suppositories  - continue probiotics, colace   - can consider taking miralax at home if needed  - advance diet as  tolerated  - patient will need outpatient colonoscopy. Our office will call to schedule this.     No further GI recommendations at this time. GI will sign off. Please call with any questions.    Caryn Dailey PA-C  Gastroenterology  [facility]

## 2023-01-11 NOTE — PLAN OF CARE
01/11/23 1426   Final Note   Assessment Type Final Discharge Note   Anticipated Discharge Disposition Home-Health  ([facility])   Hospital Resources/Appts/Education Provided Post-Acute resouces added to AVS   Post-Acute Status   Post-Acute Authorization Home Health   Home Health Status Set-up Complete/Auth obtained     Discharge information sent via [employer].

## 2023-01-12 ENCOUNTER — EXTERNAL HOME HEALTH (OUTPATIENT)
Dept: HOME HEALTH SERVICES | Facility: HOSPITAL | Age: 83
End: 2023-01-12
Payer: MEDICARE

## 2023-01-12 ENCOUNTER — TELEPHONE (OUTPATIENT)
Dept: INTERNAL MEDICINE | Facility: CLINIC | Age: 83
End: 2023-01-12
Payer: MEDICARE

## 2023-01-12 ENCOUNTER — PATIENT OUTREACH (OUTPATIENT)
Dept: ADMINISTRATIVE | Facility: CLINIC | Age: 83
End: 2023-01-12
Payer: MEDICARE

## 2023-01-12 DIAGNOSIS — M10.9 GOUT, UNSPECIFIED CAUSE, UNSPECIFIED CHRONICITY, UNSPECIFIED SITE: Primary | ICD-10-CM

## 2023-01-12 RX ORDER — ALLOPURINOL 300 MG/1
150 TABLET ORAL DAILY
Qty: 90 TABLET | Refills: 3 | Status: SHIPPED | OUTPATIENT
Start: 2023-01-12 | End: 2023-12-27 | Stop reason: SDUPTHER

## 2023-01-12 NOTE — PROGRESS NOTES
C3 nurse attempted to contact Damon Moran   for a TCC post hospital discharge follow up call. No answer at phone number listed.     The patient does not have a scheduled HOSFU appointment. Message sent to PCP's staff to assist with HOSFU appointment scheduling.APR 11  Established Patient Visit  10:00 AM  Renuka Lowery MD  Internal Medicine [facility]

## 2023-01-13 DIAGNOSIS — R53.1 WEAKNESS: ICD-10-CM

## 2023-01-13 DIAGNOSIS — R53.81 DEBILITY: Primary | ICD-10-CM

## 2023-01-13 DIAGNOSIS — N18.30 STAGE 3 CHRONIC KIDNEY DISEASE, UNSPECIFIED WHETHER STAGE 3A OR 3B CKD: ICD-10-CM

## 2023-01-13 DIAGNOSIS — I25.10 CORONARY ARTERY DISEASE INVOLVING NATIVE CORONARY ARTERY OF NATIVE HEART WITHOUT ANGINA PECTORIS: ICD-10-CM

## 2023-01-13 NOTE — PROGRESS NOTES
C3 nurse spoke with Damon Moran   for a TCC post hospital discharge follow up call. The patient does not have a scheduled HOSFU appointment with 1/11/2023 within 5-7 days post hospital discharge date 01/18/2023. C3 nurse was unable to schedule HOSFU appointment in [facility].pt has an apt with Dr Lowery   On April 11 needs apt sooner for TCC visit     Message sent to PCP staff requesting they contact patient and schedule follow up appointment.

## 2023-01-19 ENCOUNTER — OFFICE VISIT (OUTPATIENT)
Dept: INTERNAL MEDICINE | Facility: CLINIC | Age: 83
End: 2023-01-19
Payer: MEDICARE

## 2023-01-19 VITALS
RESPIRATION RATE: 20 BRPM | DIASTOLIC BLOOD PRESSURE: 66 MMHG | WEIGHT: 222 LBS | BODY MASS INDEX: 33.65 KG/M2 | SYSTOLIC BLOOD PRESSURE: 136 MMHG | HEIGHT: 68 IN | HEART RATE: 74 BPM | OXYGEN SATURATION: 99 %

## 2023-01-19 DIAGNOSIS — M25.519 SHOULDER PAIN, UNSPECIFIED CHRONICITY, UNSPECIFIED LATERALITY: ICD-10-CM

## 2023-01-19 DIAGNOSIS — K92.2 LOWER GI BLEED: ICD-10-CM
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
## 2023-01-19 PROCEDURE — 99213 OFFICE O/P EST LOW 20 MIN: CPT | Mod: ,,, | Performed by: INTERNAL MEDICINE

## 2023-01-19 PROCEDURE — 99213 PR OFFICE/OUTPT VISIT, EST, LEVL III, 20-29 MIN: ICD-10-PCS | Mod: ,,, | Performed by: INTERNAL MEDICINE

## 2023-01-19 RX ORDER — CEPHALEXIN 500 MG/1
500 CAPSULE ORAL EVERY 12 HOURS
Qty: 14 CAPSULE | Refills: 0 | Status: SHIPPED | OUTPATIENT
Start: 2023-01-19 | End: 2023-04-12

## 2023-01-19 RX ORDER — CHOLECALCIFEROL (VITAMIN D3) 25 MCG
2000 TABLET ORAL DAILY
COMMUNITY
End: 2023-08-09

## 2023-01-19 NOTE — ASSESSMENT & PLAN NOTE
He has home health and is doing pt.  Will also ask them to complete a home safety evaluation.  Also a rollator with wheels was order by recommendation of the PT.

## 2023-01-19 NOTE — Clinical Note
I forgot to ask when he was here -- did he have f/u scheduled with Dr. Gaston and the gastro doctor for colonoscopy?

## 2023-01-19 NOTE — Clinical Note
Please send order to [facility] for nursing to monitor Vital signs and do a home safety evaluation.  Also, please send order for rollator with wheels and seat to [place].  Also ask the  PT to eval and treat for left shoulder pain.

## 2023-01-19 NOTE — PROGRESS NOTES
Subjective:      Chief Complaint: Hospital Follow Up (C/o L shoulder pain- X2mo ago. Worse since he fell awhile back)      HPI:He is here for f/u rectal bleeding.  He hasn't had any more episodes of rectal bleeding since discharge.  He is now c/o shoulder pain that started about a month ago after a fall.  He doesn't remember falling.     He had an abscess on his right elbow that the ER doc drained.  It is still draining a small amount of pus.     They were told  said he needs a rollator with wheels.  HH is doing PT, but there is no nurse coming to do vitals.  They are working with [employer].      He isn't having any s/sx of hypoglycemia.  We had changed him to Tradjenta because of cost.  They aren't checking CBG.    Transitional Care Note    Family and/or Caretaker present at visit?  Yes.  Diagnostic tests reviewed/disposition: No diagnosic tests pending after this hospitalization.  Disease/illness education:    Home health/community services discussion/referrals: [facility] .   Establishment or re-establishment of referral orders for community resources:  Order for rollator and more PT for shoulder pain. .   Discussion with other health care providers: No discussion with other health care providers necessary.             Problem Noted   Fall 1/19/2023   Shoulder Pain 1/19/2023   Elevated Troponin 1/9/2023   Hematochezia 1/9/2023   Lower GI Bleed 1/8/2023   Ckd (Chronic Kidney Disease) 8/11/2022    He is followed by Dr. Stout     Type 2 Diabetes Mellitus With Stage 3a Chronic Kidney Disease, Without Long-Term Current Use of Insulin 6/16/2022   Severe Obesity (Bmi 35.0-39.9) With Comorbidity 6/16/2022   Hypertension 6/16/2022   Atrial Paroxysmal Tachycardia 6/16/2022   Hyperlipidemia 6/16/2022   Cad (Coronary Artery Disease) 6/16/2022    S/p CABG, followed by Dr. Gaston.     Male Hypogonadism 6/16/2022    Patient elected not to take hormone replacement.     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     Lb On Cpap 6/16/2022   Oa (Osteoarthritis) of Knee 6/16/2022   Tinnitus 6/16/2022   Sciatica 6/16/2022   Encounter for Medicare Annual Wellness Exam (Resolved) 8/11/2022        The patient's Health Maintenance was reviewed and the following appears to be due:   Health Maintenance Due   Topic Date Due    TETANUS VACCINE  Never done    Shingles Vaccine (1 of 2) Never done    Eye Exam  11/04/2020    COVID-19 Vaccine (3 - Booster for Pfizer series) 11/04/2021       Past Medical History:  Past Medical History:   Diagnosis Date    Atrial paroxysmal tachycardia     Chronic kidney disease, stage 3     Coronary arteriosclerosis     Diastolic dysfunction     HTN (hypertension)     Iron deficiency anemia, unspecified     Obstructive sleep apnea syndrome     Prostate cancer     Pure hypercholesterolemia     Type 2 diabetes mellitus without complications      Past Surgical History:   Procedure Laterality Date    CBP      CORONARY ARTERY BYPASS GRAFT (CABG)      x 3    Repair of finger laceration      resection of atypical mole       Review of patient's allergies indicates:   Allergen Reactions    Ramipril      tinnitus    Rosiglitazone      Dyspnea      Rosuvastatin      Current Outpatient Medications on File Prior to Visit   Medication Sig Dispense Refill    allopurinoL (ZYLOPRIM) 300 MG tablet Take 0.5 tablets (150 mg total) by mouth once daily. 90 tablet 3    aspirin 81 MG Chew Take 81 mg by mouth once daily.      atorvastatin (LIPITOR) 80 MG tablet Take 80 mg by mouth once daily.      docusate sodium (COLACE) 50 MG capsule Take 1 capsule (50 mg total) by mouth 2 (two) times daily. 60 capsule 3    ezetimibe (ZETIA) 10 mg tablet Take 10  "mg by mouth once daily.      glimepiride (AMARYL) 4 MG tablet Take 1 tablet (4 mg total) by mouth daily with breakfast. 90 tablet 3    isosorbide mononitrate (IMDUR) 30 MG 24 hr tablet Take 15 mg by mouth once daily.      Lactobacillus acidophilus 500 million cell Cap Take 1 capsule by mouth nightly. 30 capsule 3    linaGLIPtin (TRADJENTA) 5 mg Tab tablet Take 5 mg by mouth once daily.      lisinopriL (PRINIVIL,ZESTRIL) 40 MG tablet Take 1 tablet (40 mg total) by mouth once daily. 30 tablet 6    metoprolol succinate (TOPROL-XL) 50 MG 24 hr tablet Take 50 mg by mouth once daily.      vitamin D (VITAMIN D3) 1000 units Tab Take 2,000 Units by mouth once daily.       No current facility-administered medications on file prior to visit.     Social History     Socioeconomic History    Marital status:    Tobacco Use    Smoking status: Never    Smokeless tobacco: Never   Substance and Sexual Activity    Alcohol use: Never    Drug use: Never    Sexual activity: Not Currently     Family History   Problem Relation Age of Onset    Cancer Mother         cancer -- gastric and liver    Sudden death Mother     Cancer Father     Sudden death Father     Cancer Sister         breast    Breast cancer Sister     Coronary artery disease Paternal Grandfather        Review of Systems    Objective:   /66 (BP Location: Right arm, Patient Position: Sitting, BP Method: Large (Manual))   Pulse 74   Resp 20   Ht 5' 7.8" (1.722 m)   Wt 100.7 kg (222 lb)   SpO2 99%   BMI 33.96 kg/m²     Physical Exam  Constitutional:       General: He is not in acute distress.     Appearance: Normal appearance. He is not toxic-appearing.   HENT:      Head: Normocephalic and atraumatic.   Eyes:      General: No scleral icterus.  Pulmonary:      Effort: Pulmonary effort is normal.   Skin:     General: Skin is warm and dry.   Neurological:      General: No focal deficit present.      Mental Status: He is alert and oriented to person, place, and " time. Mental status is at baseline.      Gait: Gait is intact.   Psychiatric:         Mood and Affect: Mood normal.         Behavior: Behavior normal.         Thought Content: Thought content normal.         Judgment: Judgment normal.     Assessment and Plan:     Lower GI bleed  His sx resolved with rectal suppositories.  He was supposed to f/u with gastro as an outpatient for colonoscopy.    Fall  He has home health and is doing pt.  Will also ask them to complete a home safety evaluation.  Also a rollator with wheels was order by recommendation of the PT.    Shoulder pain  There  Is no ttp, rom is sl limited on exam.  Will ask pt to evaluate and treat.     Elevated troponin:  Will f/u to make sure he has an appt with cardiology.      No follow-ups on file.    Medications Ordered This Encounter   Medications    cephALEXin (KEFLEX) 500 MG capsule     Sig: Take 1 capsule (500 mg total) by mouth every 12 (twelve) hours.     Dispense:  14 capsule     Refill:  0     [unfilled]  No orders of the defined types were placed in this encounter.

## 2023-01-20 DIAGNOSIS — R53.81 DEBILITY: Primary | ICD-10-CM

## 2023-01-23 DIAGNOSIS — E11.22 TYPE 2 DIABETES MELLITUS WITH STAGE 3A CHRONIC KIDNEY DISEASE, WITHOUT LONG-TERM CURRENT USE OF INSULIN: ICD-10-CM

## 2023-01-23 DIAGNOSIS — I10 PRIMARY HYPERTENSION: Primary | ICD-10-CM

## 2023-01-23 DIAGNOSIS — N18.31 TYPE 2 DIABETES MELLITUS WITH STAGE 3A CHRONIC KIDNEY DISEASE, WITHOUT LONG-TERM CURRENT USE OF INSULIN: ICD-10-CM

## 2023-01-23 RX ORDER — LISINOPRIL 40 MG/1
40 TABLET ORAL DAILY
Qty: 90 TABLET | Refills: 3 | Status: SHIPPED | OUTPATIENT
Start: 2023-01-23 | End: 2023-08-09

## 2023-01-23 RX ORDER — LINAGLIPTIN 5 MG/1
5 TABLET, FILM COATED ORAL DAILY
Qty: 90 TABLET | Refills: 3 | Status: SHIPPED | OUTPATIENT
Start: 2023-01-23 | End: 2023-08-16 | Stop reason: SDUPTHER

## 2023-01-30 ENCOUNTER — TELEPHONE (OUTPATIENT)
Dept: INTERNAL MEDICINE | Facility: CLINIC | Age: 83
End: 2023-01-30
Payer: MEDICARE

## 2023-01-31 ENCOUNTER — TELEPHONE (OUTPATIENT)
Dept: INTERNAL MEDICINE | Facility: CLINIC | Age: 83
End: 2023-01-31
Payer: MEDICARE

## 2023-02-01 ENCOUNTER — DOCUMENT SCAN (OUTPATIENT)
Dept: HOME HEALTH SERVICES | Facility: HOSPITAL | Age: 83
End: 2023-02-01
Payer: MEDICARE

## 2023-02-06 ENCOUNTER — DOCUMENT SCAN (OUTPATIENT)
Dept: HOME HEALTH SERVICES | Facility: HOSPITAL | Age: 83
End: 2023-02-06
Payer: MEDICARE

## 2023-02-16 ENCOUNTER — DOCUMENT SCAN (OUTPATIENT)
Dept: HOME HEALTH SERVICES | Facility: HOSPITAL | Age: 83
End: 2023-02-16
Payer: MEDICARE

## 2023-02-19 PROCEDURE — G0179 PR HOME HEALTH MD RECERTIFICATION: ICD-10-PCS | Mod: ,,, | Performed by: INTERNAL MEDICINE

## 2023-02-19 PROCEDURE — G0179 MD RECERTIFICATION HHA PT: HCPCS | Mod: ,,, | Performed by: INTERNAL MEDICINE

## 2023-02-28 ENCOUNTER — DOCUMENT SCAN (OUTPATIENT)
Dept: HOME HEALTH SERVICES | Facility: HOSPITAL | Age: 83
End: 2023-02-28
Payer: MEDICARE

## 2023-03-13 ENCOUNTER — TELEPHONE (OUTPATIENT)
Dept: INTERNAL MEDICINE | Facility: CLINIC | Age: 83
End: 2023-03-13
Payer: MEDICARE

## 2023-03-13 ENCOUNTER — PATIENT OUTREACH (OUTPATIENT)
Dept: HOME HEALTH SERVICES | Facility: HOSPITAL | Age: 83
End: 2023-03-13
Payer: MEDICARE

## 2023-03-13 ENCOUNTER — EXTERNAL HOME HEALTH (OUTPATIENT)
Dept: HOME HEALTH SERVICES | Facility: HOSPITAL | Age: 83
End: 2023-03-13
Payer: MEDICARE

## 2023-03-28 ENCOUNTER — TELEPHONE (OUTPATIENT)
Dept: INTERNAL MEDICINE | Facility: CLINIC | Age: 83
End: 2023-03-28
Payer: MEDICARE

## 2023-03-29 ENCOUNTER — LAB VISIT (OUTPATIENT)
Dept: LAB | Facility: HOSPITAL | Age: 83
End: 2023-03-29
Attending: INTERNAL MEDICINE
Payer: MEDICARE

## 2023-03-29 DIAGNOSIS — E11.22 TYPE 2 DIABETES MELLITUS WITH STAGE 3A CHRONIC KIDNEY DISEASE, WITHOUT LONG-TERM CURRENT USE OF INSULIN: ICD-10-CM

## 2023-03-29 DIAGNOSIS — N18.31 TYPE 2 DIABETES MELLITUS WITH STAGE 3A CHRONIC KIDNEY DISEASE, WITHOUT LONG-TERM CURRENT USE OF INSULIN: ICD-10-CM

## 2023-03-29 LAB
ALBUMIN SERPL-MCNC: 3.5 G/DL (ref 3.4–4.8)
ALBUMIN/GLOB SERPL: 0.9 RATIO (ref 1.1–2)
ALP SERPL-CCNC: 88 UNIT/L (ref 40–150)
ALT SERPL-CCNC: 27 UNIT/L (ref 0–55)
AST SERPL-CCNC: 16 UNIT/L (ref 5–34)
BILIRUBIN DIRECT+TOT PNL SERPL-MCNC: 0.4 MG/DL
BUN SERPL-MCNC: 47.5 MG/DL (ref 8.4–25.7)
CALCIUM SERPL-MCNC: 10.1 MG/DL (ref 8.8–10)
CHLORIDE SERPL-SCNC: 111 MMOL/L (ref 98–107)
CO2 SERPL-SCNC: 26 MMOL/L (ref 23–31)
CREAT SERPL-MCNC: 1.88 MG/DL (ref 0.73–1.18)
EST. AVERAGE GLUCOSE BLD GHB EST-MCNC: 139.9 MG/DL
GFR SERPLBLD CREATININE-BSD FMLA CKD-EPI: 35 MLS/MIN/1.73/M2
GLOBULIN SER-MCNC: 3.7 GM/DL (ref 2.4–3.5)
GLUCOSE SERPL-MCNC: 129 MG/DL (ref 82–115)
HBA1C MFR BLD: 6.5 %
POTASSIUM SERPL-SCNC: 4.8 MMOL/L (ref 3.5–5.1)
PROT SERPL-MCNC: 7.2 GM/DL (ref 5.8–7.6)
SODIUM SERPL-SCNC: 142 MMOL/L (ref 136–145)

## 2023-03-29 PROCEDURE — 83036 HEMOGLOBIN GLYCOSYLATED A1C: CPT

## 2023-03-29 PROCEDURE — 80053 COMPREHEN METABOLIC PANEL: CPT

## 2023-03-29 PROCEDURE — 36415 COLL VENOUS BLD VENIPUNCTURE: CPT

## 2023-03-30 ENCOUNTER — TELEPHONE (OUTPATIENT)
Dept: INTERNAL MEDICINE | Facility: CLINIC | Age: 83
End: 2023-03-30
Payer: MEDICARE

## 2023-03-30 DIAGNOSIS — N18.9 CHRONIC KIDNEY DISEASE, UNSPECIFIED CKD STAGE: Primary | ICD-10-CM

## 2023-03-30 NOTE — TELEPHONE ENCOUNTER
Wife has been trying to get him to drink more water but she will keep trying. Wife denies he is taking any NSAIDS

## 2023-03-30 NOTE — TELEPHONE ENCOUNTER
----- Message from Bernadette Hernandez NP sent at 3/30/2023  9:42 AM CDT -----  Please let pt/family know that labs reviewed noting consistent elevation in renal function, although slightly worsened.  Has been drinking enough water or taking NSAIDs OTC? I would rec he inc water intake to 64 oz/day. Also, HOLD all forms of NSAIDs. I would like to repeat nonfasting BMP in 2 weeks prior to NOV with Dr. Lowery. Consider dec in lisinopril 40mg and changing HTN meds at that time.  F/u as scheduled.

## 2023-03-30 NOTE — TELEPHONE ENCOUNTER
Called daughter and she asked me to call wife, wife expressed understanding   Patient aware of results below.

## 2023-04-04 ENCOUNTER — TELEPHONE (OUTPATIENT)
Dept: INTERNAL MEDICINE | Facility: CLINIC | Age: 83
End: 2023-04-04
Payer: MEDICARE

## 2023-04-04 NOTE — TELEPHONE ENCOUNTER
----- Message from Francisca Reynoso LPN sent at 4/4/2023  9:12 AM CDT -----  Regarding: GILMA Lowery 4/12/23 @11:20a  Are there any outstanding tasks in the chart? no    Is there any documentation of tasks? no    Has patient seen another physician, been to the ER, C, or admitted to hospital since last visit?    Has the patient done blood work or imaging since last visit?

## 2023-04-10 PROBLEM — K92.2 LOWER GI BLEED: Status: RESOLVED | Noted: 2023-01-08 | Resolved: 2023-04-10

## 2023-04-12 ENCOUNTER — OFFICE VISIT (OUTPATIENT)
Dept: INTERNAL MEDICINE | Facility: CLINIC | Age: 83
End: 2023-04-12
Payer: MEDICARE

## 2023-04-12 ENCOUNTER — TELEPHONE (OUTPATIENT)
Dept: INTERNAL MEDICINE | Facility: CLINIC | Age: 83
End: 2023-04-12

## 2023-04-12 VITALS
DIASTOLIC BLOOD PRESSURE: 64 MMHG | BODY MASS INDEX: 33.49 KG/M2 | WEIGHT: 221 LBS | HEIGHT: 68 IN | OXYGEN SATURATION: 99 % | SYSTOLIC BLOOD PRESSURE: 162 MMHG | HEART RATE: 63 BPM | RESPIRATION RATE: 18 BRPM

## 2023-04-12 DIAGNOSIS — I10 PRIMARY HYPERTENSION: ICD-10-CM

## 2023-04-12 DIAGNOSIS — E78.5 HYPERLIPIDEMIA, UNSPECIFIED HYPERLIPIDEMIA TYPE: ICD-10-CM

## 2023-04-12 DIAGNOSIS — N18.32 STAGE 3B CHRONIC KIDNEY DISEASE: ICD-10-CM

## 2023-04-12 DIAGNOSIS — N18.31 TYPE 2 DIABETES MELLITUS WITH STAGE 3A CHRONIC KIDNEY DISEASE, WITHOUT LONG-TERM CURRENT USE OF INSULIN: Primary | ICD-10-CM

## 2023-04-12 DIAGNOSIS — E11.22 TYPE 2 DIABETES MELLITUS WITH STAGE 3A CHRONIC KIDNEY DISEASE, WITHOUT LONG-TERM CURRENT USE OF INSULIN: Primary | ICD-10-CM

## 2023-04-12 DIAGNOSIS — L98.9 SKIN LESION: ICD-10-CM

## 2023-04-12 PROCEDURE — 99214 OFFICE O/P EST MOD 30 MIN: CPT | Mod: ,,, | Performed by: INTERNAL MEDICINE

## 2023-04-12 PROCEDURE — 99214 PR OFFICE/OUTPT VISIT, EST, LEVL IV, 30-39 MIN: ICD-10-PCS | Mod: ,,, | Performed by: INTERNAL MEDICINE

## 2023-04-12 RX ORDER — INSULIN PUMP SYRINGE, 3 ML
EACH MISCELLANEOUS
Qty: 1 EACH | Refills: 0 | Status: SHIPPED | OUTPATIENT
Start: 2023-04-12 | End: 2024-04-11

## 2023-04-12 RX ORDER — LANCING DEVICE
1 EACH MISCELLANEOUS 2 TIMES DAILY WITH MEALS
Qty: 1 EACH | Refills: 0 | Status: SHIPPED | OUTPATIENT
Start: 2023-04-12 | End: 2024-04-11

## 2023-04-12 RX ORDER — LANCETS
1 EACH MISCELLANEOUS 2 TIMES DAILY WITH MEALS
Qty: 100 EACH | Refills: 11 | Status: SHIPPED | OUTPATIENT
Start: 2023-04-12

## 2023-04-12 NOTE — ASSESSMENT & PLAN NOTE
Stable on current regimen.  He is tolerating the glimepiride despite his renal dysfunction -- no hypoglycemia.  Will continue current regimen.

## 2023-04-12 NOTE — PROGRESS NOTES
Subjective:      Chief Complaint: Follow-up (Discuss labs /C/o bilateral shoulder pain- more on L side )      HPI:  He is here for f/u htn, dm, hld.  He feels good.  No complaints.  He isn't checking his BP at home.  His daughter states he checks but he doesn't really know what it runs.  He isn't having any s/sx of hypoglycemia  He doesn't check his CBG.    Problem Noted   Fall 1/19/2023   Shoulder Pain 1/19/2023   Elevated Troponin 1/9/2023   Hematochezia 1/9/2023   Ckd (Chronic Kidney Disease) 8/11/2022    He is followed by Dr. Stout       Type 2 Diabetes Mellitus With Stage 3a Chronic Kidney Disease, Without Long-Term Current Use of Insulin 6/16/2022   Severe Obesity (Bmi 35.0-39.9) With Comorbidity 6/16/2022   Hypertension 6/16/2022   Atrial Paroxysmal Tachycardia 6/16/2022   Hyperlipidemia 6/16/2022   Cad (Coronary Artery Disease) 6/16/2022    S/p CABG, followed by Dr. Gaston.       Male Hypogonadism 6/16/2022    Patient elected not to take hormone replacement.       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       Lb On Cpap 6/16/2022   Oa (Osteoarthritis) of Knee 6/16/2022   Tinnitus 6/16/2022   Sciatica 6/16/2022   Lower GI Bleed (Resolved) 1/8/2023   Encounter for Medicare Annual Wellness Exam (Resolved) 8/11/2022        The patient's Health Maintenance was reviewed and the following appears to be due:   Health Maintenance Due   Topic Date Due    TETANUS VACCINE  Never done    Shingles Vaccine (1 of 2) Never done    Eye Exam  11/04/2020    COVID-19 Vaccine (3 - Booster for Pfizer series) 11/04/2021    Lipid Panel  04/05/2023       Past Medical History:  Past Medical History:   Diagnosis Date    Atrial paroxysmal tachycardia     Chronic kidney disease, stage 3      Coronary arteriosclerosis     Diastolic dysfunction     HTN (hypertension)     Iron deficiency anemia, unspecified     Obstructive sleep apnea syndrome     Prostate cancer     Pure hypercholesterolemia     Type 2 diabetes mellitus without complications      Review of patient's allergies indicates:   Allergen Reactions    Ramipril      tinnitus    Rosiglitazone      Dyspnea      Rosuvastatin      Current Outpatient Medications on File Prior to Visit   Medication Sig Dispense Refill    allopurinoL (ZYLOPRIM) 300 MG tablet Take 0.5 tablets (150 mg total) by mouth once daily. 90 tablet 3    aspirin 81 MG Chew Take 81 mg by mouth once daily.      atorvastatin (LIPITOR) 80 MG tablet Take 80 mg by mouth once daily.      docusate sodium (COLACE) 50 MG capsule Take 1 capsule (50 mg total) by mouth 2 (two) times daily. 60 capsule 3    ezetimibe (ZETIA) 10 mg tablet Take 10 mg by mouth once daily.      glimepiride (AMARYL) 4 MG tablet Take 1 tablet (4 mg total) by mouth daily with breakfast. 90 tablet 3    isosorbide mononitrate (IMDUR) 30 MG 24 hr tablet Take 15 mg by mouth once daily.      Lactobacillus acidophilus 500 million cell Cap Take 1 capsule by mouth nightly. 30 capsule 3    linaGLIPtin (TRADJENTA) 5 mg Tab tablet Take 1 tablet (5 mg total) by mouth once daily. 90 tablet 3    lisinopriL (PRINIVIL,ZESTRIL) 40 MG tablet Take 1 tablet (40 mg total) by mouth once daily. 90 tablet 3    metoprolol succinate (TOPROL-XL) 50 MG 24 hr tablet Take 50 mg by mouth once daily.      vitamin D (VITAMIN D3) 1000 units Tab Take 2,000 Units by mouth once daily.      [DISCONTINUED] cephALEXin (KEFLEX) 500 MG capsule Take 1 capsule (500 mg total) by mouth every 12 (twelve) hours. (Patient not taking: Reported on 4/12/2023) 14 capsule 0     No current facility-administered medications on file prior to visit.       Review of Systems    Objective:   BP (!) 162/64 (BP Location: Right arm, Patient Position: Sitting, BP Method: Large  "(Manual))   Pulse 63   Resp 18   Ht 5' 7.8" (1.722 m)   Wt 100.2 kg (221 lb)   SpO2 99%   BMI 33.81 kg/m²     Physical Exam  Constitutional:       General: He is not in acute distress.     Appearance: Normal appearance. He is not toxic-appearing.   HENT:      Head: Normocephalic and atraumatic.   Eyes:      General: No scleral icterus.  Neck:      Vascular: No carotid bruit.   Cardiovascular:      Rate and Rhythm: Normal rate and regular rhythm.      Heart sounds: Normal heart sounds.   Pulmonary:      Effort: Pulmonary effort is normal.      Breath sounds: Normal breath sounds.   Musculoskeletal:      Right lower leg: No edema.      Left lower leg: No edema.   Skin:     General: Skin is warm and dry.   Neurological:      General: No focal deficit present.      Mental Status: He is alert and oriented to person, place, and time. Mental status is at baseline.      Gait: Gait is intact.   Psychiatric:         Mood and Affect: Mood normal.         Behavior: Behavior normal.         Thought Content: Thought content normal.         Judgment: Judgment normal.     Assessment and Plan:     Type 2 diabetes mellitus with stage 3a chronic kidney disease, without long-term current use of insulin  Stable on current regimen.  He is tolerating the glimepiride despite his renal dysfunction -- no hypoglycemia.  Will continue current regimen.    Hypertension  Sounds like his BP at home is a little better.  Cont lisinopril, imdur, toprol xl    Hyperlipidemia  Cont statin, recheck 4 mos.    CKD (chronic kidney disease)  Relatively stable.  Followed by Dr. Yan.      Follow up in about 4 months (around 2023).    Medications Ordered This Encounter   Medications    blood sugar diagnostic Strp     Si strip by Misc.(Non-Drug; Combo Route) route 2 (two) times daily with meals.     Dispense:  100 strip     Refill:  11    blood-glucose meter kit     Sig: Use as instructed     Dispense:  1 each     Refill:  0    lancets Misc     " Si lancet by Misc.(Non-Drug; Combo Route) route 2 (two) times daily with meals.     Dispense:  100 each     Refill:  11    lancing device Misc     Si Device by Misc.(Non-Drug; Combo Route) route 2 (two) times daily with meals.     Dispense:  1 each     Refill:  0     [unfilled]  Orders Placed This Encounter   Procedures    Hemoglobin A1C     Standing Status:   Future     Standing Expiration Date:   10/12/2024    Microalbumin/Creatinine Ratio, Urine     Standing Status:   Future     Standing Expiration Date:   6/10/2024     Order Specific Question:   Specimen Source     Answer:   Urine    CBC Auto Differential     Standing Status:   Future     Standing Expiration Date:   6/10/2024    Comprehensive Metabolic Panel     Standing Status:   Future     Standing Expiration Date:   6/10/2024    Lipid Panel     Standing Status:   Future     Standing Expiration Date:   10/12/2024    Ambulatory referral/consult to Dermatology     Standing Status:   Future     Standing Expiration Date:   2024     Referral Priority:   Routine     Referral Type:   Consultation     Referral Reason:   Specialty Services Required     Referred to Provider:   Derik Schaeffer MD     Requested Specialty:   Dermatology     Number of Visits Requested:   1

## 2023-05-02 ENCOUNTER — LAB VISIT (OUTPATIENT)
Dept: LAB | Facility: HOSPITAL | Age: 83
End: 2023-05-02
Attending: UROLOGY
Payer: MEDICARE

## 2023-05-02 DIAGNOSIS — Z12.5 SPECIAL SCREENING FOR MALIGNANT NEOPLASM OF PROSTATE: ICD-10-CM

## 2023-05-02 DIAGNOSIS — R60.9 EDEMA, UNSPECIFIED TYPE: ICD-10-CM

## 2023-05-02 DIAGNOSIS — R94.4 NONSPECIFIC ABNORMAL RESULTS OF KIDNEY FUNCTION STUDY: ICD-10-CM

## 2023-05-02 DIAGNOSIS — N18.9 CKD (CHRONIC KIDNEY DISEASE): ICD-10-CM

## 2023-05-02 DIAGNOSIS — R80.9 PROTEINURIA, UNSPECIFIED TYPE: ICD-10-CM

## 2023-05-02 DIAGNOSIS — N18.31 TYPE 2 DIABETES MELLITUS WITH STAGE 3A CHRONIC KIDNEY DISEASE, WITHOUT LONG-TERM CURRENT USE OF INSULIN: Primary | ICD-10-CM

## 2023-05-02 DIAGNOSIS — E11.22 TYPE 2 DIABETES MELLITUS WITH STAGE 3A CHRONIC KIDNEY DISEASE, WITHOUT LONG-TERM CURRENT USE OF INSULIN: Primary | ICD-10-CM

## 2023-05-02 LAB
ALBUMIN SERPL-MCNC: 3.4 G/DL (ref 3.4–4.8)
ALBUMIN/GLOB SERPL: 0.9 RATIO (ref 1.1–2)
ALP SERPL-CCNC: 100 UNIT/L (ref 40–150)
ALT SERPL-CCNC: 64 UNIT/L (ref 0–55)
APPEARANCE UR: CLEAR
AST SERPL-CCNC: 30 UNIT/L (ref 5–34)
BACTERIA #/AREA URNS AUTO: NORMAL /HPF
BASOPHILS # BLD AUTO: 0.07 X10(3)/MCL
BASOPHILS NFR BLD AUTO: 0.9 %
BILIRUB UR QL STRIP.AUTO: NEGATIVE MG/DL
BILIRUBIN DIRECT+TOT PNL SERPL-MCNC: 0.3 MG/DL
BUN SERPL-MCNC: 37.3 MG/DL (ref 8.4–25.7)
CALCIUM SERPL-MCNC: 9.8 MG/DL (ref 8.8–10)
CHLORIDE SERPL-SCNC: 110 MMOL/L (ref 98–107)
CHOLEST SERPL-MCNC: 104 MG/DL
CHOLEST SERPL-MSCNC: 104 MG/DL (ref 0–200)
CHOLEST/HDLC SERPL: 3 {RATIO} (ref 0–5)
CO2 SERPL-SCNC: 25 MMOL/L (ref 23–31)
COLOR UR AUTO: YELLOW
CREAT SERPL-MCNC: 1.83 MG/DL (ref 0.73–1.18)
CREAT UR-MCNC: 140.7 MG/DL (ref 63–166)
DEPRECATED CALCIDIOL+CALCIFEROL SERPL-MC: 60.5 NG/ML (ref 30–80)
EOSINOPHIL # BLD AUTO: 0.36 X10(3)/MCL (ref 0–0.9)
EOSINOPHIL NFR BLD AUTO: 4.6 %
ERYTHROCYTE [DISTWIDTH] IN BLOOD BY AUTOMATED COUNT: 14.1 % (ref 11.5–17)
EST. AVERAGE GLUCOSE BLD GHB EST-MCNC: 148.5 MG/DL
GFR SERPLBLD CREATININE-BSD FMLA CKD-EPI: 36 MLS/MIN/1.73/M2
GLOBULIN SER-MCNC: 3.6 GM/DL (ref 2.4–3.5)
GLUCOSE SERPL-MCNC: 126 MG/DL (ref 82–115)
GLUCOSE UR QL STRIP.AUTO: NEGATIVE MG/DL
HBA1C MFR BLD: 6.8 %
HCT VFR BLD AUTO: 36.2 % (ref 42–52)
HDLC SERPL-MCNC: 33 MG/DL (ref 35–60)
HDLC SERPL-MCNC: 33 MG/DL (ref 35–70)
HGB BLD-MCNC: 11.8 G/DL (ref 14–18)
IMM GRANULOCYTES # BLD AUTO: 0.03 X10(3)/MCL (ref 0–0.04)
IMM GRANULOCYTES NFR BLD AUTO: 0.4 %
KETONES UR QL STRIP.AUTO: NEGATIVE MG/DL
LDLC SERPL CALC-MCNC: 53 MG/DL (ref 0–160)
LDLC SERPL CALC-MCNC: 53 MG/DL (ref 50–140)
LEUKOCYTE ESTERASE UR QL STRIP.AUTO: NEGATIVE UNIT/L
LYMPHOCYTES # BLD AUTO: 2.05 X10(3)/MCL (ref 0.6–4.6)
LYMPHOCYTES NFR BLD AUTO: 26 %
MAGNESIUM SERPL-MCNC: 2.1 MG/DL (ref 1.6–2.6)
MCH RBC QN AUTO: 30.6 PG (ref 27–31)
MCHC RBC AUTO-ENTMCNC: 32.6 G/DL (ref 33–36)
MCV RBC AUTO: 93.8 FL (ref 80–94)
MONOCYTES # BLD AUTO: 0.82 X10(3)/MCL (ref 0.1–1.3)
MONOCYTES NFR BLD AUTO: 10.4 %
NEUTROPHILS # BLD AUTO: 4.56 X10(3)/MCL (ref 2.1–9.2)
NEUTROPHILS NFR BLD AUTO: 57.7 %
NITRITE UR QL STRIP.AUTO: NEGATIVE
NRBC BLD AUTO-RTO: 0 %
PH UR STRIP.AUTO: 6 [PH]
PHOSPHATE SERPL-MCNC: 3.6 MG/DL (ref 2.3–4.7)
PLATELET # BLD AUTO: 192 X10(3)/MCL (ref 130–400)
PMV BLD AUTO: 9.6 FL (ref 7.4–10.4)
POTASSIUM SERPL-SCNC: 5.1 MMOL/L (ref 3.5–5.1)
PROT SERPL-MCNC: 7 GM/DL (ref 5.8–7.6)
PROT UR QL STRIP.AUTO: 30 MG/DL
PROT UR STRIP-MCNC: 49.3 MG/DL
PSA SERPL-MCNC: 0.21 NG/ML
PTH-INTACT SERPL-MCNC: 65.5 PG/ML (ref 8.7–77)
RBC # BLD AUTO: 3.86 X10(6)/MCL (ref 4.7–6.1)
RBC #/AREA URNS AUTO: NORMAL /HPF
RBC UR QL AUTO: NEGATIVE UNIT/L
SODIUM SERPL-SCNC: 142 MMOL/L (ref 136–145)
SP GR UR STRIP.AUTO: 1.02
SQUAMOUS #/AREA URNS AUTO: NORMAL /HPF
TRIGL SERPL-MCNC: 90 MG/DL (ref 34–140)
TRIGL SERPL-MCNC: 90 MG/DL (ref 40–160)
TSH SERPL-ACNC: 1.51 UIU/ML (ref 0.35–4.94)
URINE PROTEIN/CREATININE RATIO (OHS): 0.4
UROBILINOGEN UR STRIP-ACNC: 0.2 MG/DL
VLDLC SERPL CALC-MCNC: 18 MG/DL
WBC # SPEC AUTO: 7.89 X10(3)/MCL (ref 4.5–11.5)
WBC #/AREA URNS AUTO: NORMAL /HPF

## 2023-05-02 PROCEDURE — 82570 ASSAY OF URINE CREATININE: CPT

## 2023-05-02 PROCEDURE — 36415 COLL VENOUS BLD VENIPUNCTURE: CPT

## 2023-05-02 PROCEDURE — 82306 VITAMIN D 25 HYDROXY: CPT

## 2023-05-02 PROCEDURE — 83970 ASSAY OF PARATHORMONE: CPT

## 2023-05-02 PROCEDURE — 84443 ASSAY THYROID STIM HORMONE: CPT

## 2023-05-02 PROCEDURE — 83036 HEMOGLOBIN GLYCOSYLATED A1C: CPT

## 2023-05-02 PROCEDURE — 83735 ASSAY OF MAGNESIUM: CPT

## 2023-05-02 PROCEDURE — 85025 COMPLETE CBC W/AUTO DIFF WBC: CPT

## 2023-05-02 PROCEDURE — 84153 ASSAY OF PSA TOTAL: CPT

## 2023-05-02 PROCEDURE — 81001 URINALYSIS AUTO W/SCOPE: CPT

## 2023-05-02 PROCEDURE — 80061 LIPID PANEL: CPT

## 2023-05-02 PROCEDURE — 84100 ASSAY OF PHOSPHORUS: CPT

## 2023-05-02 PROCEDURE — 80053 COMPREHEN METABOLIC PANEL: CPT

## 2023-05-23 ENCOUNTER — DOCUMENTATION ONLY (OUTPATIENT)
Dept: INTERNAL MEDICINE | Facility: CLINIC | Age: 83
End: 2023-05-23
Payer: MEDICARE

## 2023-08-02 ENCOUNTER — TELEPHONE (OUTPATIENT)
Dept: INTERNAL MEDICINE | Facility: CLINIC | Age: 83
End: 2023-08-02
Payer: MEDICARE

## 2023-08-02 NOTE — TELEPHONE ENCOUNTER
----- Message from Francisca Reynoso LPN sent at 8/2/2023  1:47 PM CDT -----  Regarding: GILMA Lowery 8/9/23 @10:00a  Are there any outstanding tasks in the chart? Pt will need fasting labs     Is there any documentation of tasks? no    Has patient seen another physician, been to the ER, C, or admitted to hospital since last visit?    Has the patient done blood work or imaging since last visit?

## 2023-08-04 ENCOUNTER — LAB VISIT (OUTPATIENT)
Dept: LAB | Facility: HOSPITAL | Age: 83
End: 2023-08-04
Attending: INTERNAL MEDICINE
Payer: MEDICARE

## 2023-08-04 DIAGNOSIS — E11.22 TYPE 2 DIABETES MELLITUS WITH STAGE 3A CHRONIC KIDNEY DISEASE, WITHOUT LONG-TERM CURRENT USE OF INSULIN: ICD-10-CM

## 2023-08-04 DIAGNOSIS — N18.31 TYPE 2 DIABETES MELLITUS WITH STAGE 3A CHRONIC KIDNEY DISEASE, WITHOUT LONG-TERM CURRENT USE OF INSULIN: ICD-10-CM

## 2023-08-04 DIAGNOSIS — E78.5 HYPERLIPIDEMIA, UNSPECIFIED HYPERLIPIDEMIA TYPE: ICD-10-CM

## 2023-08-04 DIAGNOSIS — I10 PRIMARY HYPERTENSION: ICD-10-CM

## 2023-08-04 LAB
ALBUMIN SERPL-MCNC: 3.4 G/DL (ref 3.4–4.8)
ALBUMIN/GLOB SERPL: 1 RATIO (ref 1.1–2)
ALP SERPL-CCNC: 79 UNIT/L (ref 40–150)
ALT SERPL-CCNC: 25 UNIT/L (ref 0–55)
AST SERPL-CCNC: 17 UNIT/L (ref 5–34)
BASOPHILS # BLD AUTO: 0.07 X10(3)/MCL
BASOPHILS NFR BLD AUTO: 0.9 %
BILIRUBIN DIRECT+TOT PNL SERPL-MCNC: 0.2 MG/DL
BUN SERPL-MCNC: 49 MG/DL (ref 8.4–25.7)
CALCIUM SERPL-MCNC: 9.4 MG/DL (ref 8.8–10)
CHLORIDE SERPL-SCNC: 112 MMOL/L (ref 98–107)
CHOLEST SERPL-MCNC: 205 MG/DL
CHOLEST/HDLC SERPL: 6 {RATIO} (ref 0–5)
CO2 SERPL-SCNC: 22 MMOL/L (ref 23–31)
CREAT SERPL-MCNC: 1.99 MG/DL (ref 0.73–1.18)
CREAT UR-MCNC: 111.4 MG/DL (ref 63–166)
EOSINOPHIL # BLD AUTO: 0.46 X10(3)/MCL (ref 0–0.9)
EOSINOPHIL NFR BLD AUTO: 6.2 %
ERYTHROCYTE [DISTWIDTH] IN BLOOD BY AUTOMATED COUNT: 14.5 % (ref 11.5–17)
EST. AVERAGE GLUCOSE BLD GHB EST-MCNC: 128.4 MG/DL
GFR SERPLBLD CREATININE-BSD FMLA CKD-EPI: 32 MLS/MIN/1.73/M2
GLOBULIN SER-MCNC: 3.5 GM/DL (ref 2.4–3.5)
GLUCOSE SERPL-MCNC: 122 MG/DL (ref 82–115)
HBA1C MFR BLD: 6.1 %
HCT VFR BLD AUTO: 37.2 % (ref 42–52)
HDLC SERPL-MCNC: 36 MG/DL (ref 35–60)
HGB BLD-MCNC: 12 G/DL (ref 14–18)
IMM GRANULOCYTES # BLD AUTO: 0.04 X10(3)/MCL (ref 0–0.04)
IMM GRANULOCYTES NFR BLD AUTO: 0.5 %
LDLC SERPL CALC-MCNC: 138 MG/DL (ref 50–140)
LYMPHOCYTES # BLD AUTO: 1.92 X10(3)/MCL (ref 0.6–4.6)
LYMPHOCYTES NFR BLD AUTO: 25.9 %
MCH RBC QN AUTO: 30.7 PG (ref 27–31)
MCHC RBC AUTO-ENTMCNC: 32.3 G/DL (ref 33–36)
MCV RBC AUTO: 95.1 FL (ref 80–94)
MICROALBUMIN UR-MCNC: 197.7 UG/ML
MICROALBUMIN/CREAT RATIO PNL UR: 177.5 MG/GM CR (ref 0–30)
MONOCYTES # BLD AUTO: 0.67 X10(3)/MCL (ref 0.1–1.3)
MONOCYTES NFR BLD AUTO: 9 %
NEUTROPHILS # BLD AUTO: 4.25 X10(3)/MCL (ref 2.1–9.2)
NEUTROPHILS NFR BLD AUTO: 57.5 %
NRBC BLD AUTO-RTO: 0 %
PLATELET # BLD AUTO: 219 X10(3)/MCL (ref 130–400)
PMV BLD AUTO: 10.1 FL (ref 7.4–10.4)
POTASSIUM SERPL-SCNC: 4.7 MMOL/L (ref 3.5–5.1)
PROT SERPL-MCNC: 6.9 GM/DL (ref 5.8–7.6)
RBC # BLD AUTO: 3.91 X10(6)/MCL (ref 4.7–6.1)
SODIUM SERPL-SCNC: 143 MMOL/L (ref 136–145)
TRIGL SERPL-MCNC: 157 MG/DL (ref 34–140)
VLDLC SERPL CALC-MCNC: 31 MG/DL
WBC # SPEC AUTO: 7.41 X10(3)/MCL (ref 4.5–11.5)

## 2023-08-04 PROCEDURE — 36415 COLL VENOUS BLD VENIPUNCTURE: CPT

## 2023-08-04 PROCEDURE — 85025 COMPLETE CBC W/AUTO DIFF WBC: CPT

## 2023-08-04 PROCEDURE — 82043 UR ALBUMIN QUANTITATIVE: CPT

## 2023-08-04 PROCEDURE — 80053 COMPREHEN METABOLIC PANEL: CPT

## 2023-08-04 PROCEDURE — 83036 HEMOGLOBIN GLYCOSYLATED A1C: CPT

## 2023-08-04 PROCEDURE — 80061 LIPID PANEL: CPT

## 2023-08-09 ENCOUNTER — OFFICE VISIT (OUTPATIENT)
Dept: INTERNAL MEDICINE | Facility: CLINIC | Age: 83
End: 2023-08-09
Payer: MEDICARE

## 2023-08-09 VITALS
DIASTOLIC BLOOD PRESSURE: 74 MMHG | HEIGHT: 68 IN | OXYGEN SATURATION: 96 % | HEART RATE: 51 BPM | RESPIRATION RATE: 18 BRPM | SYSTOLIC BLOOD PRESSURE: 132 MMHG | WEIGHT: 214 LBS | BODY MASS INDEX: 32.43 KG/M2

## 2023-08-09 DIAGNOSIS — E11.22 TYPE 2 DIABETES MELLITUS WITH STAGE 3A CHRONIC KIDNEY DISEASE, WITHOUT LONG-TERM CURRENT USE OF INSULIN: Primary | ICD-10-CM

## 2023-08-09 DIAGNOSIS — I50.32 CHRONIC DIASTOLIC HEART FAILURE: ICD-10-CM

## 2023-08-09 DIAGNOSIS — E21.3 HYPERPARATHYROIDISM, UNSPECIFIED: ICD-10-CM

## 2023-08-09 DIAGNOSIS — E78.5 HYPERLIPIDEMIA, UNSPECIFIED HYPERLIPIDEMIA TYPE: Primary | ICD-10-CM

## 2023-08-09 DIAGNOSIS — E66.01 SEVERE OBESITY (BMI 35.0-39.9) WITH COMORBIDITY: ICD-10-CM

## 2023-08-09 DIAGNOSIS — N18.31 TYPE 2 DIABETES MELLITUS WITH STAGE 3A CHRONIC KIDNEY DISEASE, WITHOUT LONG-TERM CURRENT USE OF INSULIN: Primary | ICD-10-CM

## 2023-08-09 DIAGNOSIS — E78.5 HYPERLIPIDEMIA, UNSPECIFIED HYPERLIPIDEMIA TYPE: ICD-10-CM

## 2023-08-09 DIAGNOSIS — N18.32 STAGE 3B CHRONIC KIDNEY DISEASE: ICD-10-CM

## 2023-08-09 DIAGNOSIS — I10 PRIMARY HYPERTENSION: ICD-10-CM

## 2023-08-09 PROCEDURE — 99214 OFFICE O/P EST MOD 30 MIN: CPT | Mod: ,,, | Performed by: INTERNAL MEDICINE

## 2023-08-09 PROCEDURE — 99214 PR OFFICE/OUTPT VISIT, EST, LEVL IV, 30-39 MIN: ICD-10-PCS | Mod: ,,, | Performed by: INTERNAL MEDICINE

## 2023-08-09 RX ORDER — ATORVASTATIN CALCIUM 80 MG/1
80 TABLET, FILM COATED ORAL DAILY
Qty: 90 TABLET | Refills: 3 | Status: SHIPPED | OUTPATIENT
Start: 2023-08-09 | End: 2023-12-27 | Stop reason: SDUPTHER

## 2023-08-09 RX ORDER — LISINOPRIL 20 MG/1
20 TABLET ORAL
COMMUNITY
Start: 2023-05-10

## 2023-08-09 RX ORDER — FUROSEMIDE 40 MG/1
40 TABLET ORAL
COMMUNITY
Start: 2023-07-05 | End: 2023-12-27

## 2023-08-09 RX ORDER — POTASSIUM CHLORIDE 20 MEQ/1
20 TABLET, EXTENDED RELEASE ORAL
COMMUNITY
Start: 2023-07-05 | End: 2023-12-27

## 2023-08-09 NOTE — PROGRESS NOTES
Subjective:      Chief Complaint: Follow-up (Discuss labs )      HPI: He is here for f/u hld, ckd, htn, dm.  He doesn't check bp or cbg at home.  But he has a machine.  He has no complaints.  His breathing is fine.  No swelling.  He isn't having any s/sx of hypoglycemia.  Problem Noted   Hyperparathyroidism, Unspecified 8/9/2023   Chronic Diastolic Heart Failure 8/9/2023   Fall 1/19/2023   Shoulder Pain 1/19/2023   Hematochezia 1/9/2023   Ckd (Chronic Kidney Disease) 8/11/2022    He is followed by Dr. Yan     Type 2 Diabetes Mellitus With Stage 3a Chronic Kidney Disease, Without Long-Term Current Use of Insulin 6/16/2022   Severe Obesity (Bmi 35.0-39.9) With Comorbidity 6/16/2022   Hypertension 6/16/2022   Atrial Paroxysmal Tachycardia 6/16/2022   Hyperlipidemia 6/16/2022   Cad (Coronary Artery Disease) 6/16/2022    S/p CABG, followed by Dr. Gaston.     Male Hypogonadism 6/16/2022    Patient elected not to take hormone replacement.     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     Lb On Cpap 6/16/2022   Oa (Osteoarthritis) of Knee 6/16/2022   Tinnitus 6/16/2022   Sciatica 6/16/2022   Lower GI Bleed (Resolved) 1/8/2023   Encounter for Medicare Annual Wellness Exam (Resolved) 8/11/2022        The patient's Health Maintenance was reviewed and the following appears to be due:   Health Maintenance Due   Topic Date Due    TETANUS VACCINE  Never done    Shingles Vaccine (1 of 2) Never done    Eye Exam  11/04/2020    COVID-19 Vaccine (3 - Pfizer series) 11/04/2021       Past Medical History:  Past Medical History:   Diagnosis Date    Atrial paroxysmal tachycardia     Chronic kidney disease, stage 3     Coronary arteriosclerosis     Diastolic dysfunction     HTN  (hypertension)     Iron deficiency anemia, unspecified     Obstructive sleep apnea syndrome     Prostate cancer     Pure hypercholesterolemia     Type 2 diabetes mellitus without complications      Review of patient's allergies indicates:   Allergen Reactions    Ramipril      tinnitus    Rosiglitazone      Dyspnea      Rosuvastatin      Current Outpatient Medications on File Prior to Visit   Medication Sig Dispense Refill    allopurinoL (ZYLOPRIM) 300 MG tablet Take 0.5 tablets (150 mg total) by mouth once daily. 90 tablet 3    aspirin 81 MG Chew Take 81 mg by mouth once daily.      blood sugar diagnostic Strp 1 strip by Misc.(Non-Drug; Combo Route) route 2 (two) times daily with meals. 100 strip 11    blood-glucose meter kit Use as instructed 1 each 0    docusate sodium (COLACE) 50 MG capsule Take 1 capsule (50 mg total) by mouth 2 (two) times daily. 60 capsule 3    ezetimibe (ZETIA) 10 mg tablet Take 10 mg by mouth once daily.      furosemide (LASIX) 40 MG tablet Take 40 mg by mouth.      glimepiride (AMARYL) 4 MG tablet Take 1 tablet (4 mg total) by mouth daily with breakfast. 90 tablet 3    isosorbide mononitrate (IMDUR) 30 MG 24 hr tablet Take 15 mg by mouth once daily.      Lactobacillus acidophilus 500 million cell Cap Take 1 capsule by mouth nightly. 30 capsule 3    lancets Misc 1 lancet by Misc.(Non-Drug; Combo Route) route 2 (two) times daily with meals. 100 each 11    lancing device Misc 1 Device by Misc.(Non-Drug; Combo Route) route 2 (two) times daily with meals. 1 each 0    linaGLIPtin (TRADJENTA) 5 mg Tab tablet Take 1 tablet (5 mg total) by mouth once daily. 90 tablet 3    lisinopriL (PRINIVIL,ZESTRIL) 20 MG tablet Take 20 mg by mouth.      metoprolol succinate (TOPROL-XL) 50 MG 24 hr tablet Take 50 mg by mouth once daily.      potassium chloride SA (K-DUR,KLOR-CON) 20 MEQ tablet Take 20 mEq by mouth.      [DISCONTINUED] atorvastatin (LIPITOR) 80 MG tablet Take 80 mg by mouth once daily.       "[DISCONTINUED] vitamin D (VITAMIN D3) 1000 units Tab Take 2,000 Units by mouth once daily.      [DISCONTINUED] lisinopriL (PRINIVIL,ZESTRIL) 40 MG tablet Take 1 tablet (40 mg total) by mouth once daily. 90 tablet 3     No current facility-administered medications on file prior to visit.       Review of Systems    Objective:   /74 (BP Location: Left arm, Patient Position: Sitting, BP Method: Small (Manual))   Pulse (!) 51   Resp 18   Ht 5' 7.8" (1.722 m)   Wt 97.1 kg (214 lb)   SpO2 96%   BMI 32.74 kg/m²     Physical Exam  Constitutional:       General: He is not in acute distress.     Appearance: Normal appearance. He is not toxic-appearing.   HENT:      Head: Normocephalic and atraumatic.   Eyes:      General: No scleral icterus.  Pulmonary:      Effort: Pulmonary effort is normal.   Skin:     General: Skin is warm and dry.   Neurological:      General: No focal deficit present.      Mental Status: He is alert and oriented to person, place, and time. Mental status is at baseline.      Gait: Gait is intact.   Psychiatric:         Mood and Affect: Mood normal.         Behavior: Behavior normal.         Thought Content: Thought content normal.         Judgment: Judgment normal.       Assessment and Plan:     Hyperlipidemia  The atorvastatin was refilled today.  It looks like he ran out.    Hypertension  Stable, continue lisinopril and toprol.    Type 2 diabetes mellitus with stage 3a chronic kidney disease, without long-term current use of insulin  Stable, continue glimepiride and tradjenta.    CKD (chronic kidney disease)  I encouraged him to drink 64 oz of fluid daily.    Hyperparathyroidism, unspecified  Calcium is normal.    Chronic diastolic heart failure  Stable on lasix.    Severe obesity (BMI 35.0-39.9) with comorbidity  He is noncompliant with diet.        Follow up in about 4 months (around 12/9/2023).       [unfilled]  No orders of the defined types were placed in this encounter.      "

## 2023-08-16 ENCOUNTER — TELEPHONE (OUTPATIENT)
Dept: INTERNAL MEDICINE | Facility: CLINIC | Age: 83
End: 2023-08-16
Payer: MEDICARE

## 2023-08-16 DIAGNOSIS — N18.31 TYPE 2 DIABETES MELLITUS WITH STAGE 3A CHRONIC KIDNEY DISEASE, WITHOUT LONG-TERM CURRENT USE OF INSULIN: ICD-10-CM

## 2023-08-16 DIAGNOSIS — I10 PRIMARY HYPERTENSION: ICD-10-CM

## 2023-08-16 DIAGNOSIS — E11.22 TYPE 2 DIABETES MELLITUS WITH STAGE 3A CHRONIC KIDNEY DISEASE, WITHOUT LONG-TERM CURRENT USE OF INSULIN: ICD-10-CM

## 2023-08-16 RX ORDER — LINAGLIPTIN 5 MG/1
5 TABLET, FILM COATED ORAL DAILY
Qty: 90 TABLET | Refills: 3 | Status: SHIPPED | OUTPATIENT
Start: 2023-08-16 | End: 2023-12-27 | Stop reason: SDUPTHER

## 2023-08-16 NOTE — TELEPHONE ENCOUNTER
----- Message from Chanell Cruz sent at 8/16/2023  3:09 PM CDT -----  Regarding: Refill  .Type:  RX Refill Request    Who Called: Chikis  Refill or New Rx:Refill  RX Name and Strength:linaGLIPtin (TRADJENTA) 5 mg Tab tablet  How is the patient currently taking it? (ex. 1XDay):1/ day  Is this a 30 day or 90 day RX:90  Preferred Pharmacy with phone number:[facility]  Local or Mail Order:mail order  Ordering Provider:Beverley  Would the patient rather a call back or a response via [facility]?   Best Call Back Number:[phone]  Additional Information: linaGLIPtin (TRADJENTA) 5 mg Tab tablet

## 2023-09-06 ENCOUNTER — TELEPHONE (OUTPATIENT)
Dept: INTERNAL MEDICINE | Facility: CLINIC | Age: 83
End: 2023-09-06
Payer: MEDICARE

## 2023-09-06 DIAGNOSIS — N18.31 TYPE 2 DIABETES MELLITUS WITH STAGE 3A CHRONIC KIDNEY DISEASE, WITHOUT LONG-TERM CURRENT USE OF INSULIN: ICD-10-CM

## 2023-09-06 DIAGNOSIS — E11.22 TYPE 2 DIABETES MELLITUS WITH STAGE 3A CHRONIC KIDNEY DISEASE, WITHOUT LONG-TERM CURRENT USE OF INSULIN: ICD-10-CM

## 2023-09-06 RX ORDER — GLIMEPIRIDE 4 MG/1
4 TABLET ORAL
Qty: 90 TABLET | Refills: 3 | Status: SHIPPED | OUTPATIENT
Start: 2023-09-06 | End: 2023-12-27 | Stop reason: SDUPTHER

## 2023-09-06 NOTE — TELEPHONE ENCOUNTER
----- Message from Chanell Cruz sent at 9/6/2023  2:43 PM CDT -----  Regarding: Refill  .Type:  RX Refill Request    Who Called: Chikis wife  Refill or New Rx:Refill  RX Name and Strength:glimepiride (AMARYL) 4 MG tablet  How is the patient currently taking it? (ex. 1XDay):1/ day  Is this a 30 day or 90 day RX:90  Preferred Pharmacy with phone number:[facility]  Local or Mail Order:Mail Order  Ordering Provider:Beverley  Would the patient rather a call back or a response via [facility]?   Best Call Back Number:[phone]  Additional Information: glimepiride (AMARYL) 4 MG tablet

## 2023-11-14 ENCOUNTER — LAB VISIT (OUTPATIENT)
Dept: LAB | Facility: HOSPITAL | Age: 83
End: 2023-11-14
Attending: INTERNAL MEDICINE
Payer: MEDICARE

## 2023-11-14 DIAGNOSIS — N18.32 CHRONIC KIDNEY DISEASE (CKD) STAGE G3B/A1, MODERATELY DECREASED GLOMERULAR FILTRATION RATE (GFR) BETWEEN 30-44 ML/MIN/1.73 SQUARE METER AND ALBUMINURIA CREATININE RATIO LESS THAN 30 MG/G: Primary | ICD-10-CM

## 2023-11-14 DIAGNOSIS — R80.9 PROTEINURIA, UNSPECIFIED TYPE: ICD-10-CM

## 2023-11-14 LAB
ALBUMIN SERPL-MCNC: 3.1 G/DL (ref 3.4–4.8)
APPEARANCE UR: CLEAR
BACTERIA #/AREA URNS AUTO: ABNORMAL /HPF
BILIRUB UR QL STRIP.AUTO: NEGATIVE
BUN SERPL-MCNC: 32.5 MG/DL (ref 8.4–25.7)
CALCIUM SERPL-MCNC: 9.3 MG/DL (ref 8.8–10)
CHLORIDE SERPL-SCNC: 112 MMOL/L (ref 98–107)
CO2 SERPL-SCNC: 23 MMOL/L (ref 23–31)
COLOR UR AUTO: ABNORMAL
CREAT SERPL-MCNC: 1.76 MG/DL (ref 0.73–1.18)
CREAT UR-MCNC: 85.4 MG/DL (ref 63–166)
GFR SERPLBLD CREATININE-BSD FMLA CKD-EPI: 37 MLS/MIN/1.73/M2
GLUCOSE SERPL-MCNC: 164 MG/DL (ref 82–115)
GLUCOSE UR QL STRIP.AUTO: NEGATIVE
HYALINE CASTS URNS QL MICRO: ABNORMAL /LPF
KETONES UR QL STRIP.AUTO: NEGATIVE
LEUKOCYTE ESTERASE UR QL STRIP.AUTO: NEGATIVE
NITRITE UR QL STRIP.AUTO: NEGATIVE
PH UR STRIP.AUTO: 6 [PH]
PHOSPHATE SERPL-MCNC: 3.4 MG/DL (ref 2.3–4.7)
POTASSIUM SERPL-SCNC: 4.7 MMOL/L (ref 3.5–5.1)
PROT UR QL STRIP.AUTO: 100
PROT UR STRIP-MCNC: 63.7 MG/DL
RBC #/AREA URNS AUTO: ABNORMAL /HPF
RBC UR QL AUTO: NEGATIVE
SODIUM SERPL-SCNC: 144 MMOL/L (ref 136–145)
SP GR UR STRIP.AUTO: 1.02 (ref 1–1.03)
SQUAMOUS #/AREA URNS AUTO: ABNORMAL /HPF
URINE PROTEIN/CREATININE RATIO (OHS): 0.7
UROBILINOGEN UR STRIP-ACNC: 0.2
WBC #/AREA URNS AUTO: ABNORMAL /HPF

## 2023-11-14 PROCEDURE — 80069 RENAL FUNCTION PANEL: CPT

## 2023-11-14 PROCEDURE — 36415 COLL VENOUS BLD VENIPUNCTURE: CPT

## 2023-11-14 PROCEDURE — 82570 ASSAY OF URINE CREATININE: CPT

## 2023-11-14 PROCEDURE — 81001 URINALYSIS AUTO W/SCOPE: CPT

## 2023-11-15 ENCOUNTER — TELEPHONE (OUTPATIENT)
Dept: INTERNAL MEDICINE | Facility: CLINIC | Age: 83
End: 2023-11-15
Payer: MEDICARE

## 2023-11-15 NOTE — TELEPHONE ENCOUNTER
----- Message from Alena Senegal sent at 11/15/2023  1:57 PM CST -----  .Type:  Needs Medical Advice    Who Called:  pt's daughter ( Amrita)    Symptoms (please be specific):  no     How long has patient had these symptoms:   no    Pharmacy name and phone #:   no    Would the patient rather a call back? Yes    Best Call Back Number:  [phone]    Additional Information:  she has a question about this medication linaGLIPtin (TRADJENTA) 5 mg tab  She did not want to say anything else thanks

## 2023-12-20 ENCOUNTER — TELEPHONE (OUTPATIENT)
Dept: INTERNAL MEDICINE | Facility: CLINIC | Age: 83
End: 2023-12-20
Payer: MEDICARE

## 2023-12-20 DIAGNOSIS — E11.22 TYPE 2 DIABETES MELLITUS WITH STAGE 3A CHRONIC KIDNEY DISEASE, WITHOUT LONG-TERM CURRENT USE OF INSULIN: Primary | ICD-10-CM

## 2023-12-20 DIAGNOSIS — N18.31 TYPE 2 DIABETES MELLITUS WITH STAGE 3A CHRONIC KIDNEY DISEASE, WITHOUT LONG-TERM CURRENT USE OF INSULIN: Primary | ICD-10-CM

## 2023-12-26 ENCOUNTER — LAB VISIT (OUTPATIENT)
Dept: LAB | Facility: HOSPITAL | Age: 83
End: 2023-12-26
Attending: INTERNAL MEDICINE
Payer: MEDICARE

## 2023-12-26 DIAGNOSIS — E11.22 TYPE 2 DIABETES MELLITUS WITH STAGE 3A CHRONIC KIDNEY DISEASE, WITHOUT LONG-TERM CURRENT USE OF INSULIN: ICD-10-CM

## 2023-12-26 DIAGNOSIS — N18.31 TYPE 2 DIABETES MELLITUS WITH STAGE 3A CHRONIC KIDNEY DISEASE, WITHOUT LONG-TERM CURRENT USE OF INSULIN: ICD-10-CM

## 2023-12-26 LAB
EST. AVERAGE GLUCOSE BLD GHB EST-MCNC: 151.3 MG/DL
HBA1C MFR BLD: 6.9 %

## 2023-12-26 PROCEDURE — 83036 HEMOGLOBIN GLYCOSYLATED A1C: CPT

## 2023-12-26 PROCEDURE — 36415 COLL VENOUS BLD VENIPUNCTURE: CPT

## 2023-12-27 ENCOUNTER — OFFICE VISIT (OUTPATIENT)
Dept: INTERNAL MEDICINE | Facility: CLINIC | Age: 83
End: 2023-12-27
Payer: MEDICARE

## 2023-12-27 VITALS
OXYGEN SATURATION: 95 % | RESPIRATION RATE: 18 BRPM | SYSTOLIC BLOOD PRESSURE: 134 MMHG | DIASTOLIC BLOOD PRESSURE: 72 MMHG | WEIGHT: 217 LBS | HEART RATE: 66 BPM | HEIGHT: 68 IN | BODY MASS INDEX: 32.89 KG/M2

## 2023-12-27 DIAGNOSIS — E11.22 TYPE 2 DIABETES MELLITUS WITH STAGE 3A CHRONIC KIDNEY DISEASE, WITHOUT LONG-TERM CURRENT USE OF INSULIN: Primary | ICD-10-CM

## 2023-12-27 DIAGNOSIS — N18.31 TYPE 2 DIABETES MELLITUS WITH STAGE 3A CHRONIC KIDNEY DISEASE, WITHOUT LONG-TERM CURRENT USE OF INSULIN: Primary | ICD-10-CM

## 2023-12-27 DIAGNOSIS — N18.31 TYPE 2 DIABETES MELLITUS WITH STAGE 3A CHRONIC KIDNEY DISEASE, WITHOUT LONG-TERM CURRENT USE OF INSULIN: ICD-10-CM

## 2023-12-27 DIAGNOSIS — E78.5 HYPERLIPIDEMIA, UNSPECIFIED HYPERLIPIDEMIA TYPE: ICD-10-CM

## 2023-12-27 DIAGNOSIS — I10 PRIMARY HYPERTENSION: ICD-10-CM

## 2023-12-27 DIAGNOSIS — I47.19 ATRIAL PAROXYSMAL TACHYCARDIA: ICD-10-CM

## 2023-12-27 DIAGNOSIS — Z00.00 ENCOUNTER FOR MEDICARE ANNUAL WELLNESS EXAM: ICD-10-CM

## 2023-12-27 DIAGNOSIS — M10.9 GOUT, UNSPECIFIED CAUSE, UNSPECIFIED CHRONICITY, UNSPECIFIED SITE: ICD-10-CM

## 2023-12-27 DIAGNOSIS — G47.33 OSA ON CPAP: ICD-10-CM

## 2023-12-27 DIAGNOSIS — I50.32 CHRONIC DIASTOLIC HEART FAILURE: ICD-10-CM

## 2023-12-27 DIAGNOSIS — E11.22 TYPE 2 DIABETES MELLITUS WITH STAGE 3A CHRONIC KIDNEY DISEASE, WITHOUT LONG-TERM CURRENT USE OF INSULIN: ICD-10-CM

## 2023-12-27 DIAGNOSIS — I51.89 DIASTOLIC DYSFUNCTION: ICD-10-CM

## 2023-12-27 PROCEDURE — G0439 PPPS, SUBSEQ VISIT: HCPCS | Mod: ,,, | Performed by: INTERNAL MEDICINE

## 2023-12-27 PROCEDURE — G0439 PR MEDICARE ANNUAL WELLNESS SUBSEQUENT VISIT: ICD-10-PCS | Mod: ,,, | Performed by: INTERNAL MEDICINE

## 2023-12-27 RX ORDER — ATORVASTATIN CALCIUM 80 MG/1
80 TABLET, FILM COATED ORAL DAILY
Qty: 90 TABLET | Refills: 3 | Status: SHIPPED | OUTPATIENT
Start: 2023-12-27

## 2023-12-27 RX ORDER — ALLOPURINOL 300 MG/1
150 TABLET ORAL DAILY
Qty: 90 TABLET | Refills: 3 | Status: SHIPPED | OUTPATIENT
Start: 2023-12-27

## 2023-12-27 RX ORDER — LINAGLIPTIN 5 MG/1
5 TABLET, FILM COATED ORAL DAILY
Qty: 90 TABLET | Refills: 3 | Status: SHIPPED | OUTPATIENT
Start: 2023-12-27

## 2023-12-27 RX ORDER — GLIMEPIRIDE 4 MG/1
4 TABLET ORAL
Qty: 90 TABLET | Refills: 3 | Status: SHIPPED | OUTPATIENT
Start: 2023-12-27

## 2023-12-27 NOTE — PROGRESS NOTES
Subjective:        Patient Care Team:  Renuka Lowery MD as PCP - General (Internal Medicine)  Renuka Lowery MD Liu, Michael K., MD as Consulting Physician (Nephrology)  Gurinder Gaston MD as Consulting Physician (Cardiology)     Chief Complaint: Medicare AWV (Discuss labs /)      HPI:He is here for a medicare wellness.  No complaints.  His eye is itchy since yesterday.  No vision changes.  He doesn't check his glucose at home.  Denies s/sx of hypoglycemia.  He has a sitter that comes three times per week for 4 hours.  She doesn't think they are checking the BP at home.  His wife was in the hospital and he wasn't taking his meds like he should.  And his diet was not his normal diet.  No gout flares.  Problem Noted   Encounter for Medicare Annual Wellness Exam 8/11/2022   Hyperparathyroidism, Unspecified 8/9/2023   Chronic Diastolic Heart Failure 8/9/2023   Fall 1/19/2023   Shoulder Pain 1/19/2023   Hematochezia 1/9/2023   Ckd (Chronic Kidney Disease) 8/11/2022    He is followed by Dr. Yan     Type 2 Diabetes Mellitus With Stage 3a Chronic Kidney Disease, Without Long-Term Current Use of Insulin 6/16/2022   Severe Obesity (Bmi 35.0-39.9) With Comorbidity 6/16/2022   Hypertension 6/16/2022   Atrial Paroxysmal Tachycardia 6/16/2022   Hyperlipidemia 6/16/2022   Cad (Coronary Artery Disease) 6/16/2022    S/p CABG, followed by Dr. Gaston.     Male Hypogonadism 6/16/2022    Patient elected not to take hormone replacement.     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     Lb On Cpap 6/16/2022   Oa (Osteoarthritis) of Knee 6/16/2022   Tinnitus 6/16/2022   Sciatica 6/16/2022   Lower GI Bleed (Resolved) 1/8/2023        The patient's Health Maintenance was reviewed and the  following appears to be due:   Health Maintenance Due   Topic Date Due    TETANUS VACCINE  Never done    Shingles Vaccine (1 of 2) Never done    Eye Exam  11/04/2020    COVID-19 Vaccine (3 - 2023-24 season) 09/01/2023       Past Medical History:  Past Medical History:   Diagnosis Date    Atrial paroxysmal tachycardia     Chronic kidney disease, stage 3     Coronary arteriosclerosis     Diastolic dysfunction     HTN (hypertension)     Iron deficiency anemia, unspecified     Obstructive sleep apnea syndrome     Prostate cancer     Pure hypercholesterolemia     Type 2 diabetes mellitus without complications      Past Surgical History:   Procedure Laterality Date    CBP      CORONARY ARTERY BYPASS GRAFT (CABG)      x 3    Repair of finger laceration      resection of atypical mole       Review of patient's allergies indicates:   Allergen Reactions    Ramipril      tinnitus    Rosiglitazone      Dyspnea      Rosuvastatin      Current Outpatient Medications on File Prior to Visit   Medication Sig Dispense Refill    aspirin 81 MG Chew Take 81 mg by mouth once daily.      blood sugar diagnostic Strp 1 strip by Misc.(Non-Drug; Combo Route) route 2 (two) times daily with meals. 100 strip 11    blood-glucose meter kit Use as instructed 1 each 0    docusate sodium (COLACE) 50 MG capsule Take 1 capsule (50 mg total) by mouth 2 (two) times daily. 60 capsule 3    ezetimibe (ZETIA) 10 mg tablet Take 10 mg by mouth once daily.      isosorbide mononitrate (IMDUR) 30 MG 24 hr tablet Take 15 mg by mouth once daily.      Lactobacillus acidophilus 500 million cell Cap Take 1 capsule by mouth nightly. 30 capsule 3    lancets Misc 1 lancet by Misc.(Non-Drug; Combo Route) route 2 (two) times daily with meals. 100 each 11    lancing device Misc 1 Device by Misc.(Non-Drug; Combo Route) route 2 (two) times daily with meals. 1 each 0    lisinopriL (PRINIVIL,ZESTRIL) 20 MG tablet Take 20 mg by mouth.      metoprolol succinate (TOPROL-XL) 50 MG  24 hr tablet Take 50 mg by mouth once daily.      [DISCONTINUED] allopurinoL (ZYLOPRIM) 300 MG tablet Take 0.5 tablets (150 mg total) by mouth once daily. 90 tablet 3    [DISCONTINUED] atorvastatin (LIPITOR) 80 MG tablet Take 1 tablet (80 mg total) by mouth once daily. 90 tablet 3    [DISCONTINUED] glimepiride (AMARYL) 4 MG tablet Take 1 tablet (4 mg total) by mouth daily with breakfast. 90 tablet 3    [DISCONTINUED] linaGLIPtin (TRADJENTA) 5 mg Tab tablet Take 1 tablet (5 mg total) by mouth once daily. 90 tablet 3    [DISCONTINUED] furosemide (LASIX) 40 MG tablet Take 40 mg by mouth.      [DISCONTINUED] potassium chloride SA (K-DUR,KLOR-CON) 20 MEQ tablet Take 20 mEq by mouth.       No current facility-administered medications on file prior to visit.     Social History     Socioeconomic History    Marital status:    Tobacco Use    Smoking status: Never    Smokeless tobacco: Never   Substance and Sexual Activity    Alcohol use: Never    Drug use: Never    Sexual activity: Not Currently     Family History   Problem Relation Age of Onset    Cancer Mother         cancer -- gastric and liver    Sudden death Mother     Cancer Father     Sudden death Father     Cancer Sister         breast    Breast cancer Sister     Coronary artery disease Paternal Grandfather        Review of Systems   Respiratory:  Negative for shortness of breath.    Cardiovascular:  Positive for leg swelling (mild, unchanged and maybe slightly better than they wre.). Negative for chest pain and palpitations.       Patient Reported Health Risk Assessment  What is your age?: 80 or older  Are you male or female?: Male  During the past four weeks, how much have you been bothered by emotional problems such as feeling anxious, depressed, irritable, sad, or downhearted and blue?: Not at all  During the past five weeks, has your physical and/or emotional health limited your social activities with family, friends, neighbors, or groups?: Not at  all  During the past four weeks, how much bodily pain have you generally had?: No pain  During the past four weeks, was someone available to help if you needed and wanted help?: Yes, as much as I wanted  During the past four weeks, what was the hardest physical activity you could do for at least two minutes?: Moderate  Can you get to places out of walking distance without help?  (For example, can you travel alone on buses or taxis, or drive your own car?): Yes  Can you go shopping for groceries or clothes without someone's help?: Yes  Can you prepare your own meals?: Yes  Can you do your own housework without help?: Yes  Because of any health problems, do you need the help of another person with your personal care needs such as eating, bathing, dressing, or getting around the house?: No  Can you handle your own money without help?: Yes  During the past four weeks, how would you rate your health in general?: Excellent  How have things been going for you during the past four weeks?: Very well  Are you having difficulties driving your car?: Yes, often  Do you always fasten your seat belt when you are in a car?: Yes, usually  How often in the past four weeks have you been bothered by falling or dizzy when standing up?: Never  How often in the past four weeks have you been bothered by sexual problems?: Never  How often in the past four weeks have you been bothered by trouble eating well?: Never  How often in the past four weeks have you been bothered by teeth or denture problems?: Never  How often in the past four weeks have you been bothered with problems using the telephone?: Never  How often in the past four weeks have you been bothered by tiredness or fatigue?: Never  Have you fallen two or more times in the past year?: No  Are you afraid of falling?: No  Are you a smoker?: No  During the past four weeks, how many drinks of wine, beer, or other alcoholic beverages did you have?: No alcohol at all  Do you exercise for  "about 20 minutes three or more days a week?: Yes, most of the time  Have you been given any information to help you with hazards in your house that might hurt you?: Yes  Have you been given any information to help you with keeping track of your medications?: Yes  How often do you have trouble taking medicines the way you've been told to take them?: I always take them as prescribed  How confident are you that you can control and manage most of your health problems?: Very confident  What is your race? (Check all that apply.):     Opioid Screening: Patient medication list reviewed, patient is not taking prescription opioids. Patient is not using additional opioids than prescribed. Patient is at low risk of substance abuse based on this opioid use history.     Objective:   /72 (BP Location: Left arm, Patient Position: Sitting, BP Method: Small (Manual))   Pulse 66   Resp 18   Ht 5' 7.8" (1.722 m)   Wt 98.4 kg (217 lb)   SpO2 95%   BMI 33.19 kg/m²     Physical Exam  Vitals reviewed.   Constitutional:       Appearance: Normal appearance.   HENT:      Head: Normocephalic and atraumatic.   Eyes:      Comments: Left eye injected   Cardiovascular:      Rate and Rhythm: Normal rate and regular rhythm.      Pulses: Normal pulses.      Heart sounds: Normal heart sounds. No murmur heard.     No friction rub. No gallop.   Pulmonary:      Effort: Pulmonary effort is normal. No respiratory distress.      Breath sounds: Normal breath sounds. No wheezing, rhonchi or rales.   Abdominal:      General: Abdomen is flat. There is no distension.      Palpations: Abdomen is soft. There is no mass.      Tenderness: There is no guarding or rebound.   Musculoskeletal:      Right lower leg: No edema.      Left lower leg: No edema.   Skin:     General: Skin is warm and dry.   Neurological:      General: No focal deficit present.      Mental Status: He is alert and oriented to person, place, and time.   Psychiatric:         " Mood and Affect: Mood normal.         Behavior: Behavior normal.         Thought Content: Thought content normal.         Judgment: Judgment normal.                No data to display                  12/27/2023    10:00 AM 8/9/2023    10:00 AM 4/12/2023     1:00 PM 1/19/2023     2:40 PM 12/14/2022     1:00 PM 8/11/2022     1:20 PM   Fall Risk Assessment - Outpatient   Mobility Status Ambulatory w/ assistance Ambulatory w/ assistance Ambulatory w/ assistance Ambulatory w/ assistance Ambulatory Ambulatory w/ assistance   Number of falls 0 0 0 1 0 0   Identified as fall risk True True True True False False           Depression Screening  Over the past two weeks, has the patient felt down, depressed, or hopeless?: No  Over the past two weeks, has the patient felt little interest or pleasure in doing things?: No  Functional Ability/Safety Screening  Was the patient's timed Up & Go test unsteady or longer than 30 seconds?: No  Does the patient need help with phone, transportation, shopping, preparing meals, housework, laundry, meds, or managing money?: No  Does the patient's home have rugs in the hallway, lack grab bars in the bathroom, lack handrails on the stairs or have poor lighting?: No  Have you noticed any hearing difficulties?: No  Cognitive Function (Assessed through direct observation with due consideration of information obtained by way of patient reports and/or concerns raised by family, friends, caretakers, or others)    Does the patient repeat questions/statements in the same day?: No  Does the patient have trouble remembering the date, year, and time?: No  Does the patient have difficulty managing finances?: No  Does the patient have a decreased sense of direction?: No    Assessment and Plan:     Encounter for Medicare annual wellness exam  Health Maintenance Due   Topic Date Due    TETANUS VACCINE  Never done    Shingles Vaccine (1 of 2) Never done    Eye Exam  11/04/2020    COVID-19 Vaccine (3 - 2023-24  season) 09/01/2023       I rec an eye exam.    Type 2 diabetes mellitus with stage 3a chronic kidney disease, without long-term current use of insulin  Stable.  Continue tradjenta and glimepiride.    Hypertension  Stable.  Continue lisinopril and toprol xl.    Atrial paroxysmal tachycardia  Controlled.  Continue bb.    Hyperlipidemia  Controlled.  Continue atorvastatin.    Diastolic dysfunction  Dr. Gaston put him on Lasix in July.  His daughter doesn't know why.  He stopped taking it.  They don't find the swelling to be any worse now.  In fact, it might be a little better.  He hasn't taken the lasix since October.  Will hold off on any more for now.  While he was on it, he looked a little prerenal.     Follow up in about 4 months (around 4/27/2024).       [unfilled]  No orders of the defined types were placed in this encounter.        Medicare Annual Wellness and Personalized Prevention Plan:   Fall Risk + Home Safety + Hearing Impairment + Depression Screen + Cognitive Impairment Screen + Health Risk Assessment all reviewed    Advance Care Planning   I attest to discussing Advance Care Planning with patient and/or family member.  Education was provided including the importance of the Health Care Power of , Advance Directives, and/or [place] documentation.  The patient expressed understanding to the importance of this information and discussion.       Follow up in about 4 months (around 4/27/2024). In addition to their scheduled follow up, the patient has also been instructed to follow up on as needed basis.

## 2023-12-27 NOTE — ASSESSMENT & PLAN NOTE
Dr. Gaston put him on Lasix in July.  His daughter doesn't know why.  He stopped taking it.  They don't find the swelling to be any worse now.  In fact, it might be a little better.  He hasn't taken the lasix since October.  Will hold off on any more for now.  While he was on it, he looked a little prerenal.

## 2023-12-27 NOTE — ASSESSMENT & PLAN NOTE
Health Maintenance Due   Topic Date Due    TETANUS VACCINE  Never done    Shingles Vaccine (1 of 2) Never done    Eye Exam  11/04/2020    COVID-19 Vaccine (3 - 2023-24 season) 09/01/2023       I rec an eye exam.

## 2024-02-28 ENCOUNTER — TELEPHONE (OUTPATIENT)
Dept: INTERNAL MEDICINE | Facility: CLINIC | Age: 84
End: 2024-02-28
Payer: MEDICARE

## 2024-02-28 NOTE — TELEPHONE ENCOUNTER
I spoke with daughter, Amrita. She said patient's wife passed away on 2/20/24. She donated her body to science. She said that when her dad dies, he will also donate his body to science. She requested that I get together all the patient's medical history so she will have it prepared bc she did not have it prepared for her mother and it was a lot to get together. The medical school needs the information. I told her I will start working on this and will let her know when the papers are ready for . I told her it is probably also on the [facility] joe already but she said she does not know how to use the joe much.

## 2024-02-28 NOTE — TELEPHONE ENCOUNTER
Paperwork for patient's history printed and left in front office for . Patient's daughter, Amrita notified and verbalized understanding.

## 2024-02-28 NOTE — TELEPHONE ENCOUNTER
----- Message from Brook Pate MA sent at 2/28/2024  7:22 AM CST -----  Regarding: FW: pt info    ----- Message -----  From: Anni Cunningham  Sent: 2/28/2024   7:07 AM CST  To: [facility] Staff  Subject: pt info                                          Type:  Needs Medical Advice    Who Called: pt's daughter amrita    Would the patient rather a call back or a response via [facility]? C/b  Best Call Back Number: [phone]    Additional Information: pt's wife Chikis has passed away.  Please send all information pertaining to pt to daughter Amrita      Please contact amrita to discuss
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
## 2024-03-13 ENCOUNTER — OFFICE VISIT (OUTPATIENT)
Dept: INTERNAL MEDICINE | Facility: CLINIC | Age: 84
End: 2024-03-13
Payer: MEDICARE

## 2024-03-13 ENCOUNTER — TELEPHONE (OUTPATIENT)
Dept: INTERNAL MEDICINE | Facility: CLINIC | Age: 84
End: 2024-03-13

## 2024-03-13 VITALS
WEIGHT: 211 LBS | HEART RATE: 58 BPM | RESPIRATION RATE: 16 BRPM | DIASTOLIC BLOOD PRESSURE: 72 MMHG | HEIGHT: 67 IN | BODY MASS INDEX: 33.12 KG/M2 | SYSTOLIC BLOOD PRESSURE: 130 MMHG | OXYGEN SATURATION: 96 %

## 2024-03-13 DIAGNOSIS — N18.31 TYPE 2 DIABETES MELLITUS WITH STAGE 3A CHRONIC KIDNEY DISEASE, WITHOUT LONG-TERM CURRENT USE OF INSULIN: ICD-10-CM

## 2024-03-13 DIAGNOSIS — E11.22 TYPE 2 DIABETES MELLITUS WITH STAGE 3A CHRONIC KIDNEY DISEASE, WITHOUT LONG-TERM CURRENT USE OF INSULIN: ICD-10-CM

## 2024-03-13 DIAGNOSIS — F32.89 OTHER DEPRESSION: ICD-10-CM

## 2024-03-13 DIAGNOSIS — F32.A DEPRESSION, UNSPECIFIED DEPRESSION TYPE: Primary | ICD-10-CM

## 2024-03-13 DIAGNOSIS — R41.3 MEMORY LOSS: Primary | ICD-10-CM

## 2024-03-13 DIAGNOSIS — E66.01 SEVERE OBESITY (BMI 35.0-39.9) WITH COMORBIDITY: ICD-10-CM

## 2024-03-13 DIAGNOSIS — I70.0 AORTIC ATHEROSCLEROSIS: ICD-10-CM

## 2024-03-13 PROBLEM — K62.5 RECTAL BLEEDING: Status: ACTIVE | Noted: 2024-03-13

## 2024-03-13 PROBLEM — N18.32 STAGE 3B CHRONIC KIDNEY DISEASE: Status: RESOLVED | Noted: 2024-03-13 | Resolved: 2024-03-13

## 2024-03-13 PROBLEM — I48.91 ATRIAL FIBRILLATION: Status: ACTIVE | Noted: 2024-03-13

## 2024-03-13 PROBLEM — N28.9 KIDNEY DISEASE: Status: ACTIVE | Noted: 2024-03-13

## 2024-03-13 PROBLEM — F03.90 DEMENTIA: Status: ACTIVE | Noted: 2024-03-13

## 2024-03-13 PROBLEM — G47.30 SLEEP APNEA: Status: ACTIVE | Noted: 2022-06-16

## 2024-03-13 PROBLEM — N18.32 STAGE 3B CHRONIC KIDNEY DISEASE: Status: ACTIVE | Noted: 2024-03-13

## 2024-03-13 PROBLEM — D64.9 ANEMIA: Status: ACTIVE | Noted: 2024-03-13

## 2024-03-13 PROBLEM — E11.9 DIABETES MELLITUS: Status: ACTIVE | Noted: 2022-06-16

## 2024-03-13 LAB
APPEARANCE UR: CLEAR
BACTERIA #/AREA URNS AUTO: ABNORMAL /HPF
BILIRUB UR QL STRIP.AUTO: NEGATIVE
COLOR UR AUTO: YELLOW
GLUCOSE UR QL STRIP.AUTO: NORMAL
HYALINE CASTS #/AREA URNS LPF: ABNORMAL /LPF
KETONES UR QL STRIP.AUTO: NEGATIVE
LEUKOCYTE ESTERASE UR QL STRIP.AUTO: NEGATIVE
NITRITE UR QL STRIP.AUTO: NEGATIVE
PH UR STRIP.AUTO: 5.5 [PH]
PROT UR QL STRIP.AUTO: ABNORMAL
RBC #/AREA URNS AUTO: ABNORMAL /HPF
RBC UR QL AUTO: NEGATIVE
SP GR UR STRIP.AUTO: 1.01 (ref 1–1.03)
SQUAMOUS #/AREA URNS LPF: ABNORMAL /HPF
UROBILINOGEN UR STRIP-ACNC: NORMAL
WBC #/AREA URNS AUTO: ABNORMAL /HPF

## 2024-03-13 PROCEDURE — 99214 OFFICE O/P EST MOD 30 MIN: CPT | Mod: ,,, | Performed by: NURSE PRACTITIONER

## 2024-03-13 PROCEDURE — 81001 URINALYSIS AUTO W/SCOPE: CPT | Performed by: NURSE PRACTITIONER

## 2024-03-13 RX ORDER — FUROSEMIDE 40 MG/1
40 TABLET ORAL
COMMUNITY
Start: 2024-01-05

## 2024-03-13 RX ORDER — POTASSIUM CHLORIDE 20 MEQ/1
20 TABLET, EXTENDED RELEASE ORAL DAILY
COMMUNITY
Start: 2024-01-05

## 2024-03-13 RX ORDER — BUPROPION HYDROCHLORIDE 150 MG/1
150 TABLET ORAL DAILY
Qty: 30 TABLET | Refills: 11 | Status: SHIPPED | OUTPATIENT
Start: 2024-03-13 | End: 2024-03-14

## 2024-03-13 RX ORDER — CITALOPRAM 10 MG/1
10 TABLET ORAL DAILY
Qty: 30 TABLET | Refills: 11 | Status: SHIPPED | OUTPATIENT
Start: 2024-03-13 | End: 2024-03-13

## 2024-03-13 NOTE — TELEPHONE ENCOUNTER
Spoke with pts daughter. She verbalized understanding and wants you to put notes that you have concerns about the OV today. So she can have paper trail. Also let Mrs. Crowe know that UA was negative and to f/u as needed.

## 2024-03-13 NOTE — TELEPHONE ENCOUNTER
----- Message from Bernadette Hernandez NP sent at 3/13/2024  2:16 PM CDT -----  Please let pt/family know that OV details discussed with Dr. Lowery.  MMSE notes score of 17/30 indicating cognitive decline. She would like to tx depressive s/s and consider further tx at NOV. Follow up as needed.

## 2024-03-13 NOTE — TELEPHONE ENCOUNTER
Patient's wife called with concerns about side effects to Celexa Medication indicated on the print out.  Concerned with May cause Drowsiness (advised to take in the morning) and Do not take with NSAIDS, may cause stomach bleed (Patient takes 81 mg aspirin daily)    Okay to take medication?

## 2024-03-13 NOTE — TELEPHONE ENCOUNTER
Mrs. Crowe has been made aware of the alternative medication sent to pharmacy, Do not take Celexa  She verbalized understanding

## 2024-03-14 ENCOUNTER — TELEPHONE (OUTPATIENT)
Dept: INTERNAL MEDICINE | Facility: CLINIC | Age: 84
End: 2024-03-14
Payer: MEDICARE

## 2024-03-14 DIAGNOSIS — F32.89 OTHER DEPRESSION: Primary | ICD-10-CM

## 2024-03-14 RX ORDER — CITALOPRAM 10 MG/1
10 TABLET ORAL DAILY
Qty: 30 TABLET | Refills: 11 | Status: SHIPPED | OUTPATIENT
Start: 2024-03-14 | End: 2025-03-14

## 2024-03-14 NOTE — TELEPHONE ENCOUNTER
Spoke with pt's daughter about results. She states she would not like the Wellbutrin because of suicidal thoughts. She would like for him to continue with Citalopram. Please advise?

## 2024-03-14 NOTE — TELEPHONE ENCOUNTER
----- Message from Bernadette Hernandez NP sent at 3/14/2024  7:59 AM CDT -----  Folate and B12 WNL.  As previously mentioned, let's attempt Wellbutrin for next 6 weeks. Have him schedule either virtual or in-office visit at that time to ensure improvement in s/s or follow up should s/s worsen.

## 2024-04-01 PROBLEM — Z00.00 ENCOUNTER FOR MEDICARE ANNUAL WELLNESS EXAM: Status: RESOLVED | Noted: 2022-08-11 | Resolved: 2024-04-01

## 2024-04-24 ENCOUNTER — TELEPHONE (OUTPATIENT)
Dept: INTERNAL MEDICINE | Facility: CLINIC | Age: 84
End: 2024-04-24
Payer: MEDICARE

## 2024-04-24 NOTE — TELEPHONE ENCOUNTER
----- Message from Francisca Reynoso LPN sent at 4/23/2024 11:36 AM CDT -----  Regarding: GILMA Lowery 5/1/24 @10:00a  Are there any outstanding tasks in the chart? Pt will need nonfasting labs    Is there any documentation of tasks? no    Please tell patient to bring living will, power or , or advance directive document to visit if they have it.

## 2024-04-30 ENCOUNTER — LAB VISIT (OUTPATIENT)
Dept: LAB | Facility: HOSPITAL | Age: 84
End: 2024-04-30
Attending: INTERNAL MEDICINE
Payer: MEDICARE

## 2024-04-30 DIAGNOSIS — E21.3 HYPERPARATHYROIDISM, UNSPECIFIED: ICD-10-CM

## 2024-04-30 DIAGNOSIS — D63.1 ANEMIA OF CHRONIC RENAL FAILURE, UNSPECIFIED CKD STAGE: Primary | ICD-10-CM

## 2024-04-30 DIAGNOSIS — I12.9 PARENCHYMAL RENAL HYPERTENSION: ICD-10-CM

## 2024-04-30 DIAGNOSIS — N18.9 ANEMIA OF CHRONIC RENAL FAILURE, UNSPECIFIED CKD STAGE: Primary | ICD-10-CM

## 2024-04-30 DIAGNOSIS — E55.9 VITAMIN D DEFICIENCY DISEASE: ICD-10-CM

## 2024-04-30 DIAGNOSIS — R80.9 PROTEINURIA, UNSPECIFIED TYPE: ICD-10-CM

## 2024-04-30 DIAGNOSIS — N18.31 TYPE 2 DIABETES MELLITUS WITH STAGE 3A CHRONIC KIDNEY DISEASE, WITHOUT LONG-TERM CURRENT USE OF INSULIN: ICD-10-CM

## 2024-04-30 DIAGNOSIS — E11.22 TYPE 2 DIABETES MELLITUS WITH STAGE 3A CHRONIC KIDNEY DISEASE, WITHOUT LONG-TERM CURRENT USE OF INSULIN: ICD-10-CM

## 2024-04-30 DIAGNOSIS — N18.32 CHRONIC KIDNEY DISEASE (CKD) STAGE G3B/A1, MODERATELY DECREASED GLOMERULAR FILTRATION RATE (GFR) BETWEEN 30-44 ML/MIN/1.73 SQUARE METER AND ALBUMINURIA CREATININE RATIO LESS THAN 30 MG/G: ICD-10-CM

## 2024-04-30 LAB
ALBUMIN SERPL-MCNC: 3.3 G/DL (ref 3.4–4.8)
ALBUMIN/GLOB SERPL: 0.9 RATIO (ref 1.1–2)
ALP SERPL-CCNC: 90 UNIT/L (ref 40–150)
ALT SERPL-CCNC: 54 UNIT/L (ref 0–55)
APPEARANCE UR: CLEAR
AST SERPL-CCNC: 28 UNIT/L (ref 5–34)
BACTERIA #/AREA URNS AUTO: NORMAL /HPF
BILIRUB SERPL-MCNC: 0.3 MG/DL
BILIRUB UR QL STRIP.AUTO: NEGATIVE
BUN SERPL-MCNC: 67.3 MG/DL (ref 8.4–25.7)
CALCIUM SERPL-MCNC: 9.2 MG/DL (ref 8.8–10)
CHLORIDE SERPL-SCNC: 112 MMOL/L (ref 98–107)
CO2 SERPL-SCNC: 23 MMOL/L (ref 23–31)
COLOR UR AUTO: ABNORMAL
CREAT SERPL-MCNC: 2.3 MG/DL (ref 0.73–1.18)
CREAT UR-MCNC: 57.8 MG/DL (ref 63–166)
DEPRECATED CALCIDIOL+CALCIFEROL SERPL-MC: 36.4 NG/ML (ref 30–80)
ERYTHROCYTE [DISTWIDTH] IN BLOOD BY AUTOMATED COUNT: 16.1 % (ref 11.5–17)
EST. AVERAGE GLUCOSE BLD GHB EST-MCNC: 137 MG/DL
GFR SERPLBLD CREATININE-BSD FMLA CKD-EPI: 27 MLS/MIN/1.73/M2
GLOBULIN SER-MCNC: 3.6 GM/DL (ref 2.4–3.5)
GLUCOSE SERPL-MCNC: 109 MG/DL (ref 82–115)
GLUCOSE UR QL STRIP.AUTO: NEGATIVE
HBA1C MFR BLD: 6.4 %
HCT VFR BLD AUTO: 32.1 % (ref 42–52)
HGB BLD-MCNC: 10.4 G/DL (ref 14–18)
KETONES UR QL STRIP.AUTO: NEGATIVE
LEUKOCYTE ESTERASE UR QL STRIP.AUTO: NEGATIVE
MAGNESIUM SERPL-MCNC: 2.3 MG/DL (ref 1.6–2.6)
MCH RBC QN AUTO: 31.1 PG (ref 27–31)
MCHC RBC AUTO-ENTMCNC: 32.4 G/DL (ref 33–36)
MCV RBC AUTO: 96.1 FL (ref 80–94)
NITRITE UR QL STRIP.AUTO: NEGATIVE
NRBC BLD AUTO-RTO: 0 %
PH UR STRIP.AUTO: 6 [PH]
PHOSPHATE SERPL-MCNC: 3.6 MG/DL (ref 2.3–4.7)
PLATELET # BLD AUTO: 204 X10(3)/MCL (ref 130–400)
PMV BLD AUTO: 9.6 FL (ref 7.4–10.4)
POTASSIUM SERPL-SCNC: 5.1 MMOL/L (ref 3.5–5.1)
PROT SERPL-MCNC: 6.9 GM/DL (ref 5.8–7.6)
PROT UR QL STRIP.AUTO: ABNORMAL
PROT UR STRIP-MCNC: 20.5 MG/DL
PTH-INTACT SERPL-MCNC: 84.6 PG/ML (ref 8.7–77)
RBC # BLD AUTO: 3.34 X10(6)/MCL (ref 4.7–6.1)
RBC #/AREA URNS AUTO: NORMAL /HPF
RBC UR QL AUTO: ABNORMAL
SODIUM SERPL-SCNC: 141 MMOL/L (ref 136–145)
SP GR UR STRIP.AUTO: 1.01 (ref 1–1.03)
SQUAMOUS #/AREA URNS AUTO: NORMAL /HPF
TSH SERPL-ACNC: 1.49 UIU/ML (ref 0.35–4.94)
UROBILINOGEN UR STRIP-ACNC: 0.2
WBC # SPEC AUTO: 7.94 X10(3)/MCL (ref 4.5–11.5)
WBC #/AREA URNS AUTO: NORMAL /HPF

## 2024-04-30 PROCEDURE — 84156 ASSAY OF PROTEIN URINE: CPT

## 2024-04-30 PROCEDURE — 85027 COMPLETE CBC AUTOMATED: CPT

## 2024-04-30 PROCEDURE — 36415 COLL VENOUS BLD VENIPUNCTURE: CPT

## 2024-04-30 PROCEDURE — 81001 URINALYSIS AUTO W/SCOPE: CPT

## 2024-04-30 PROCEDURE — 80053 COMPREHEN METABOLIC PANEL: CPT

## 2024-04-30 PROCEDURE — 83036 HEMOGLOBIN GLYCOSYLATED A1C: CPT

## 2024-04-30 PROCEDURE — 84443 ASSAY THYROID STIM HORMONE: CPT

## 2024-04-30 PROCEDURE — 83970 ASSAY OF PARATHORMONE: CPT

## 2024-04-30 PROCEDURE — 83735 ASSAY OF MAGNESIUM: CPT

## 2024-04-30 PROCEDURE — 82306 VITAMIN D 25 HYDROXY: CPT

## 2024-04-30 PROCEDURE — 82570 ASSAY OF URINE CREATININE: CPT

## 2024-04-30 PROCEDURE — 84100 ASSAY OF PHOSPHORUS: CPT

## 2024-05-01 ENCOUNTER — OFFICE VISIT (OUTPATIENT)
Dept: INTERNAL MEDICINE | Facility: CLINIC | Age: 84
End: 2024-05-01
Payer: MEDICARE

## 2024-05-01 VITALS
RESPIRATION RATE: 16 BRPM | BODY MASS INDEX: 34.84 KG/M2 | TEMPERATURE: 98 F | HEIGHT: 67 IN | DIASTOLIC BLOOD PRESSURE: 88 MMHG | OXYGEN SATURATION: 98 % | SYSTOLIC BLOOD PRESSURE: 138 MMHG | WEIGHT: 222 LBS | HEART RATE: 69 BPM

## 2024-05-01 DIAGNOSIS — I50.32 CHRONIC DIASTOLIC HEART FAILURE: ICD-10-CM

## 2024-05-01 DIAGNOSIS — D64.9 ANEMIA, UNSPECIFIED TYPE: Primary | ICD-10-CM

## 2024-05-01 DIAGNOSIS — D50.9 IRON DEFICIENCY ANEMIA, UNSPECIFIED IRON DEFICIENCY ANEMIA TYPE: ICD-10-CM

## 2024-05-01 DIAGNOSIS — E11.22 TYPE 2 DIABETES MELLITUS WITH STAGE 3A CHRONIC KIDNEY DISEASE, WITHOUT LONG-TERM CURRENT USE OF INSULIN: ICD-10-CM

## 2024-05-01 DIAGNOSIS — E21.3 HYPERPARATHYROIDISM, UNSPECIFIED: ICD-10-CM

## 2024-05-01 DIAGNOSIS — N18.31 TYPE 2 DIABETES MELLITUS WITH STAGE 3A CHRONIC KIDNEY DISEASE, WITHOUT LONG-TERM CURRENT USE OF INSULIN: ICD-10-CM

## 2024-05-01 PROCEDURE — 99214 OFFICE O/P EST MOD 30 MIN: CPT | Mod: ,,, | Performed by: INTERNAL MEDICINE

## 2024-05-01 NOTE — ASSESSMENT & PLAN NOTE
He is a little dry.  Will alert Dr. Yan.  Could consider reducing the lasix a bit, or just monitor.  Recheck 3 mos.

## 2024-05-01 NOTE — PROGRESS NOTES
Subjective:      Chief Complaint: Diabetes (4months f/u )      HPI:  He is here for DM, HTN, h/o CKD, diastolic heart failure.  He has no complaints.  He doesn't check his BP athome.  His daughter is here with him.  She says there is a sitter that goes a few times per week.  She will check his BP.  They think the BP has been stable.  Daughter doesn't know what the numbers are.  Denies s/sx of hypoglycemia.  Denies swelling in his legs.  Denies sob.  Denies chest pain.  No palpitations.  His appetitie is not great.    He will see Dr. Yan on Wednesday of next week.    Problem Noted   Anemia 3/13/2024   Hyperparathyroidism, Unspecified 8/9/2023   Chronic Diastolic Heart Failure 8/9/2023   Fall 1/19/2023   Shoulder Pain 1/19/2023   Hematochezia 1/9/2023   Ckd (Chronic Kidney Disease) 8/11/2022    He is followed by Dr. Yan     Type 2 Diabetes Mellitus With Stage 3a Chronic Kidney Disease, Without Long-Term Current Use of Insulin 6/16/2022   Severe Obesity (Bmi 35.0-39.9) With Comorbidity 6/16/2022   Essential (Primary) Hypertension 6/16/2022   Atrial Paroxysmal Tachycardia 6/16/2022   Hyperlipidemia 6/16/2022   Coronary Artery Disease 6/16/2022    S/p CABG, followed by Dr. Gaston.     Male Hypogonadism 6/16/2022    Patient elected not to take hormone replacement.     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     Sleep Apnea 6/16/2022   Oa (Osteoarthritis) of Knee 6/16/2022   Tinnitus 6/16/2022   Sciatica 6/16/2022   Encounter for Medicare Annual Wellness Exam (Resolved) 8/11/2022   Lower GI Bleed (Resolved) 1/8/2023        The patient's Health Maintenance was reviewed and the following appears to be due:   Health Maintenance Due   Topic Date Due    TETANUS VACCINE  Never  done    Shingles Vaccine (1 of 2) Never done    Eye Exam  11/04/2020       Past Medical History:  Past Medical History:   Diagnosis Date    Atrial paroxysmal tachycardia     Chronic kidney disease, stage 3     Coronary arteriosclerosis     Diastolic dysfunction     HTN (hypertension)     Iron deficiency anemia, unspecified     Obstructive sleep apnea syndrome     Prostate cancer     Pure hypercholesterolemia     Type 2 diabetes mellitus without complications      Review of patient's allergies indicates:   Allergen Reactions    Ramipril      tinnitus    Rosiglitazone      Dyspnea      Rosuvastatin      Current Outpatient Medications on File Prior to Visit   Medication Sig Dispense Refill    allopurinoL (ZYLOPRIM) 300 MG tablet Take 0.5 tablets (150 mg total) by mouth once daily. 90 tablet 3    aspirin 81 MG Chew Take 81 mg by mouth once daily.      atorvastatin (LIPITOR) 80 MG tablet Take 1 tablet (80 mg total) by mouth once daily. 90 tablet 3    blood sugar diagnostic Strp 1 strip by Misc.(Non-Drug; Combo Route) route 2 (two) times daily with meals. 100 strip 11    blood-glucose meter kit Use as instructed (Patient taking differently: 1 each by Other route once daily. Use as instructed) 1 each 0    citalopram (CELEXA) 10 MG tablet Take 1 tablet (10 mg total) by mouth once daily. 30 tablet 11    docusate sodium (COLACE) 50 MG capsule Take 1 capsule (50 mg total) by mouth 2 (two) times daily. 60 capsule 3    ezetimibe (ZETIA) 10 mg tablet Take 10 mg by mouth once daily.      furosemide (LASIX) 40 MG tablet Take 40 mg by mouth.      isosorbide mononitrate (IMDUR) 30 MG 24 hr tablet Take 15 mg by mouth once daily.      Lactobacillus acidophilus 500 million cell Cap Take 1 capsule by mouth nightly. 30 capsule 3    lancets Misc 1 lancet by Misc.(Non-Drug; Combo Route) route 2 (two) times daily with meals. 100 each 11    lancing device Misc 1 Device by Misc.(Non-Drug; Combo Route) route 2 (two) times daily with meals. 1  "each 0    linaGLIPtin (TRADJENTA) 5 mg Tab tablet Take 1 tablet (5 mg total) by mouth once daily. 90 tablet 3    lisinopriL (PRINIVIL,ZESTRIL) 20 MG tablet Take 20 mg by mouth.      metoprolol succinate (TOPROL-XL) 50 MG 24 hr tablet Take 50 mg by mouth once daily.      potassium chloride SA (K-DUR,KLOR-CON) 20 MEQ tablet Take 20 mEq by mouth once daily.      [DISCONTINUED] glimepiride (AMARYL) 4 MG tablet Take 1 tablet (4 mg total) by mouth daily with breakfast. 90 tablet 3     No current facility-administered medications on file prior to visit.       Review of Systems    Objective:   /88 (BP Location: Left arm, Patient Position: Sitting, BP Method: Medium (Manual))   Pulse 69   Temp 98 °F (36.7 °C)   Resp 16   Ht 5' 7" (1.702 m)   Wt 100.7 kg (222 lb)   SpO2 98%   BMI 34.77 kg/m²     Physical Exam  Constitutional:       General: He is not in acute distress.     Appearance: Normal appearance. He is not toxic-appearing.   HENT:      Head: Normocephalic and atraumatic.   Eyes:      General: No scleral icterus.  Cardiovascular:      Rate and Rhythm: Normal rate and regular rhythm.      Heart sounds: Murmur (II/VI msm) heard.   Pulmonary:      Effort: Pulmonary effort is normal.      Breath sounds: No stridor.      Comments: Sl diminished at rt base, o/w cta bilaterally.  Musculoskeletal:      Comments: Very mild pitting pretibial edema     Skin:     General: Skin is warm and dry.   Neurological:      General: No focal deficit present.      Mental Status: He is alert and oriented to person, place, and time. Mental status is at baseline.      Gait: Gait is intact.   Psychiatric:         Mood and Affect: Mood normal.         Behavior: Behavior normal.         Thought Content: Thought content normal.         Judgment: Judgment normal.       Assessment and Plan:     Anemia  Could be related to the worsening renal function.  Will do some f/u labs in 3 mos.    Chronic diastolic heart failure  He is a little dry. "  Will alert Dr. Yan.  Could consider reducing the lasix a bit, or just monitor.  Recheck 3 mos.    Hyperparathyroidism, unspecified  Calcium level is normal.      Iron deficiency anemia  Check levels prior to next visit.    Type 2 diabetes mellitus with stage 3a chronic kidney disease, without long-term current use of insulin  Stop glimepiride because of worsening renal function.  Add jardiance.  Continue tradjenta.  Recheck 3 mos.      Follow up in about 3 months (around 8/1/2024).    Medications Ordered This Encounter   Medications    empagliflozin (JARDIANCE) 10 mg tablet     Sig: Take 1 tablet (10 mg total) by mouth once daily.     Dispense:  30 tablet     Refill:  6     [unfilled]  No orders of the defined types were placed in this encounter.

## 2024-05-01 NOTE — ASSESSMENT & PLAN NOTE
Stop glimepiride because of worsening renal function.  Add jardiance.  Continue tradjenta.  Recheck 3 mos.

## 2024-05-22 ENCOUNTER — LAB VISIT (OUTPATIENT)
Dept: LAB | Facility: HOSPITAL | Age: 84
End: 2024-05-22
Attending: INTERNAL MEDICINE
Payer: MEDICARE

## 2024-05-22 DIAGNOSIS — C61 PROSTATE CANCER: Primary | ICD-10-CM

## 2024-05-22 DIAGNOSIS — Z12.5 ENCOUNTER FOR SCREENING FOR MALIGNANT NEOPLASM OF PROSTATE: ICD-10-CM

## 2024-05-22 DIAGNOSIS — I50.9 CONGESTIVE HEART FAILURE, UNSPECIFIED HF CHRONICITY, UNSPECIFIED HEART FAILURE TYPE: ICD-10-CM

## 2024-05-22 DIAGNOSIS — N17.9 ACUTE KIDNEY FAILURE, UNSPECIFIED: ICD-10-CM

## 2024-05-22 LAB
ALBUMIN SERPL-MCNC: 3.4 G/DL (ref 3.4–4.8)
BNP BLD-MCNC: 349.6 PG/ML
BUN SERPL-MCNC: 64.6 MG/DL (ref 8.4–25.7)
CALCIUM SERPL-MCNC: 9.3 MG/DL (ref 8.8–10)
CHLORIDE SERPL-SCNC: 111 MMOL/L (ref 98–107)
CO2 SERPL-SCNC: 18 MMOL/L (ref 23–31)
CREAT SERPL-MCNC: 2.42 MG/DL (ref 0.73–1.18)
GFR SERPLBLD CREATININE-BSD FMLA CKD-EPI: 26 ML/MIN/1.73/M2
GLUCOSE SERPL-MCNC: 179 MG/DL (ref 82–115)
PHOSPHATE SERPL-MCNC: 4.4 MG/DL (ref 2.3–4.7)
POTASSIUM SERPL-SCNC: 4.9 MMOL/L (ref 3.5–5.1)
PSA SERPL-MCNC: 0.22 NG/ML
SODIUM SERPL-SCNC: 139 MMOL/L (ref 136–145)

## 2024-05-22 PROCEDURE — 83880 ASSAY OF NATRIURETIC PEPTIDE: CPT

## 2024-05-22 PROCEDURE — 36415 COLL VENOUS BLD VENIPUNCTURE: CPT

## 2024-05-22 PROCEDURE — 80069 RENAL FUNCTION PANEL: CPT

## 2024-05-22 PROCEDURE — 84153 ASSAY OF PSA TOTAL: CPT

## 2024-06-17 PROBLEM — K62.5 RECTAL BLEEDING: Status: RESOLVED | Noted: 2024-03-13 | Resolved: 2024-06-17

## 2024-06-20 ENCOUNTER — LAB VISIT (OUTPATIENT)
Dept: LAB | Facility: HOSPITAL | Age: 84
End: 2024-06-20
Attending: INTERNAL MEDICINE
Payer: MEDICARE

## 2024-06-20 DIAGNOSIS — I50.9 HEART FAILURE, UNSPECIFIED HF CHRONICITY, UNSPECIFIED HEART FAILURE TYPE: ICD-10-CM

## 2024-06-20 DIAGNOSIS — N17.9 ACUTE KIDNEY FAILURE, UNSPECIFIED: Primary | ICD-10-CM

## 2024-06-20 LAB
ALBUMIN SERPL-MCNC: 3.3 G/DL (ref 3.4–4.8)
BNP BLD-MCNC: 472.7 PG/ML
BUN SERPL-MCNC: 31.8 MG/DL (ref 8.4–25.7)
CALCIUM SERPL-MCNC: 10 MG/DL (ref 8.8–10)
CHLORIDE SERPL-SCNC: 105 MMOL/L (ref 98–107)
CO2 SERPL-SCNC: 24 MMOL/L (ref 23–31)
CREAT SERPL-MCNC: 2.33 MG/DL (ref 0.73–1.18)
GFR SERPLBLD CREATININE-BSD FMLA CKD-EPI: 27 ML/MIN/1.73/M2
GLUCOSE SERPL-MCNC: 257 MG/DL (ref 82–115)
PHOSPHATE SERPL-MCNC: 3.6 MG/DL (ref 2.3–4.7)
POTASSIUM SERPL-SCNC: 4.5 MMOL/L (ref 3.5–5.1)
SODIUM SERPL-SCNC: 141 MMOL/L (ref 136–145)

## 2024-06-20 PROCEDURE — 83880 ASSAY OF NATRIURETIC PEPTIDE: CPT

## 2024-06-20 PROCEDURE — 36415 COLL VENOUS BLD VENIPUNCTURE: CPT

## 2024-06-20 PROCEDURE — 80069 RENAL FUNCTION PANEL: CPT

## 2024-07-24 ENCOUNTER — LAB VISIT (OUTPATIENT)
Dept: LAB | Facility: HOSPITAL | Age: 84
End: 2024-07-24
Attending: INTERNAL MEDICINE
Payer: MEDICARE

## 2024-07-24 DIAGNOSIS — N17.9 ACUTE KIDNEY FAILURE, UNSPECIFIED: Primary | ICD-10-CM

## 2024-07-24 DIAGNOSIS — I50.810 BERNHEIM'S SYNDROME: ICD-10-CM

## 2024-07-24 LAB
ALBUMIN SERPL-MCNC: 3.2 G/DL (ref 3.4–4.8)
BNP BLD-MCNC: 435.3 PG/ML
BUN SERPL-MCNC: 40.4 MG/DL (ref 8.4–25.7)
CALCIUM SERPL-MCNC: 9.4 MG/DL (ref 8.8–10)
CHLORIDE SERPL-SCNC: 112 MMOL/L (ref 98–107)
CO2 SERPL-SCNC: 21 MMOL/L (ref 23–31)
CREAT SERPL-MCNC: 2.25 MG/DL (ref 0.73–1.18)
GFR SERPLBLD CREATININE-BSD FMLA CKD-EPI: 28 ML/MIN/1.73/M2
GLUCOSE SERPL-MCNC: 224 MG/DL (ref 82–115)
PHOSPHATE SERPL-MCNC: 3.9 MG/DL (ref 2.3–4.7)
POTASSIUM SERPL-SCNC: 4.8 MMOL/L (ref 3.5–5.1)
SODIUM SERPL-SCNC: 141 MMOL/L (ref 136–145)

## 2024-07-24 PROCEDURE — 83880 ASSAY OF NATRIURETIC PEPTIDE: CPT

## 2024-07-24 PROCEDURE — 36415 COLL VENOUS BLD VENIPUNCTURE: CPT

## 2024-07-24 PROCEDURE — 80069 RENAL FUNCTION PANEL: CPT

## 2024-07-31 ENCOUNTER — TELEPHONE (OUTPATIENT)
Dept: INTERNAL MEDICINE | Facility: CLINIC | Age: 84
End: 2024-07-31
Payer: MEDICARE

## 2024-07-31 NOTE — TELEPHONE ENCOUNTER
----- Message from Francisca Reynoso LPN sent at 7/31/2024  8:57 AM CDT -----  Regarding: GILMA Lowery 8/7/24 @10:40a  Are there any outstanding tasks in the chart? Pt will need nonfasting labs    Is there any documentation of tasks? no    Please tell patient to bring living will, power of , or advance directive document to visit if they have it.

## 2024-08-06 ENCOUNTER — LAB VISIT (OUTPATIENT)
Dept: LAB | Facility: HOSPITAL | Age: 84
End: 2024-08-06
Attending: INTERNAL MEDICINE
Payer: MEDICARE

## 2024-08-06 DIAGNOSIS — N18.31 TYPE 2 DIABETES MELLITUS WITH STAGE 3A CHRONIC KIDNEY DISEASE, WITHOUT LONG-TERM CURRENT USE OF INSULIN: ICD-10-CM

## 2024-08-06 DIAGNOSIS — E21.3 HYPERPARATHYROIDISM, UNSPECIFIED: ICD-10-CM

## 2024-08-06 DIAGNOSIS — I50.32 CHRONIC DIASTOLIC HEART FAILURE: ICD-10-CM

## 2024-08-06 DIAGNOSIS — D50.9 IRON DEFICIENCY ANEMIA, UNSPECIFIED IRON DEFICIENCY ANEMIA TYPE: ICD-10-CM

## 2024-08-06 DIAGNOSIS — E11.22 TYPE 2 DIABETES MELLITUS WITH STAGE 3A CHRONIC KIDNEY DISEASE, WITHOUT LONG-TERM CURRENT USE OF INSULIN: ICD-10-CM

## 2024-08-06 DIAGNOSIS — D64.9 ANEMIA, UNSPECIFIED TYPE: ICD-10-CM

## 2024-08-06 LAB
ANION GAP SERPL CALC-SCNC: 9 MEQ/L
BASOPHILS # BLD AUTO: 0.05 X10(3)/MCL
BASOPHILS NFR BLD AUTO: 0.7 %
BUN SERPL-MCNC: 41.5 MG/DL (ref 8.4–25.7)
CALCIUM SERPL-MCNC: 9.4 MG/DL (ref 8.8–10)
CHLORIDE SERPL-SCNC: 110 MMOL/L (ref 98–107)
CO2 SERPL-SCNC: 21 MMOL/L (ref 23–31)
CREAT SERPL-MCNC: 1.95 MG/DL (ref 0.73–1.18)
CREAT/UREA NIT SERPL: 21
EOSINOPHIL # BLD AUTO: 0.29 X10(3)/MCL (ref 0–0.9)
EOSINOPHIL NFR BLD AUTO: 3.9 %
ERYTHROCYTE [DISTWIDTH] IN BLOOD BY AUTOMATED COUNT: 14.3 % (ref 11.5–17)
EST. AVERAGE GLUCOSE BLD GHB EST-MCNC: 197.3 MG/DL
FERRITIN SERPL-MCNC: 97.4 NG/ML (ref 21.81–274.66)
GFR SERPLBLD CREATININE-BSD FMLA CKD-EPI: 33 ML/MIN/1.73/M2
GLUCOSE SERPL-MCNC: 238 MG/DL (ref 82–115)
HBA1C MFR BLD: 8.5 %
HCT VFR BLD AUTO: 36.4 % (ref 42–52)
HGB BLD-MCNC: 11.8 G/DL (ref 14–18)
IMM GRANULOCYTES # BLD AUTO: 0.07 X10(3)/MCL (ref 0–0.04)
IMM GRANULOCYTES NFR BLD AUTO: 0.9 %
IRON SATN MFR SERPL: 27 % (ref 20–50)
IRON SERPL-MCNC: 48 UG/DL (ref 65–175)
LYMPHOCYTES # BLD AUTO: 1.12 X10(3)/MCL (ref 0.6–4.6)
LYMPHOCYTES NFR BLD AUTO: 15.1 %
MCH RBC QN AUTO: 31.2 PG (ref 27–31)
MCHC RBC AUTO-ENTMCNC: 32.4 G/DL (ref 33–36)
MCV RBC AUTO: 96.3 FL (ref 80–94)
MONOCYTES # BLD AUTO: 0.73 X10(3)/MCL (ref 0.1–1.3)
MONOCYTES NFR BLD AUTO: 9.8 %
NEUTROPHILS # BLD AUTO: 5.18 X10(3)/MCL (ref 2.1–9.2)
NEUTROPHILS NFR BLD AUTO: 69.6 %
NRBC BLD AUTO-RTO: 0 %
PLATELET # BLD AUTO: 203 X10(3)/MCL (ref 130–400)
PMV BLD AUTO: 9.8 FL (ref 7.4–10.4)
POTASSIUM SERPL-SCNC: 4.3 MMOL/L (ref 3.5–5.1)
RBC # BLD AUTO: 3.78 X10(6)/MCL (ref 4.7–6.1)
SODIUM SERPL-SCNC: 140 MMOL/L (ref 136–145)
TIBC SERPL-MCNC: 133 UG/DL (ref 69–240)
TIBC SERPL-MCNC: 181 UG/DL (ref 250–450)
TRANSFERRIN SERPL-MCNC: 170 MG/DL
WBC # BLD AUTO: 7.44 X10(3)/MCL (ref 4.5–11.5)

## 2024-08-06 PROCEDURE — 85025 COMPLETE CBC W/AUTO DIFF WBC: CPT

## 2024-08-06 PROCEDURE — 82728 ASSAY OF FERRITIN: CPT

## 2024-08-06 PROCEDURE — 36415 COLL VENOUS BLD VENIPUNCTURE: CPT

## 2024-08-06 PROCEDURE — 83540 ASSAY OF IRON: CPT

## 2024-08-06 PROCEDURE — 83036 HEMOGLOBIN GLYCOSYLATED A1C: CPT

## 2024-08-06 PROCEDURE — 80048 BASIC METABOLIC PNL TOTAL CA: CPT

## 2024-08-07 ENCOUNTER — OFFICE VISIT (OUTPATIENT)
Dept: INTERNAL MEDICINE | Facility: CLINIC | Age: 84
End: 2024-08-07
Payer: MEDICARE

## 2024-08-07 VITALS
SYSTOLIC BLOOD PRESSURE: 126 MMHG | HEIGHT: 67 IN | BODY MASS INDEX: 33.59 KG/M2 | RESPIRATION RATE: 18 BRPM | OXYGEN SATURATION: 99 % | DIASTOLIC BLOOD PRESSURE: 62 MMHG | WEIGHT: 214 LBS | HEART RATE: 60 BPM

## 2024-08-07 DIAGNOSIS — E78.5 HYPERLIPIDEMIA, UNSPECIFIED HYPERLIPIDEMIA TYPE: ICD-10-CM

## 2024-08-07 DIAGNOSIS — N18.32 STAGE 3B CHRONIC KIDNEY DISEASE: ICD-10-CM

## 2024-08-07 DIAGNOSIS — E11.22 TYPE 2 DIABETES MELLITUS WITH STAGE 3A CHRONIC KIDNEY DISEASE, WITHOUT LONG-TERM CURRENT USE OF INSULIN: Primary | ICD-10-CM

## 2024-08-07 DIAGNOSIS — G31.84 MILD COGNITIVE IMPAIRMENT: ICD-10-CM

## 2024-08-07 DIAGNOSIS — I38 VALVULAR HEART DISEASE: ICD-10-CM

## 2024-08-07 DIAGNOSIS — N18.31 TYPE 2 DIABETES MELLITUS WITH STAGE 3A CHRONIC KIDNEY DISEASE, WITHOUT LONG-TERM CURRENT USE OF INSULIN: Primary | ICD-10-CM

## 2024-08-07 DIAGNOSIS — D50.9 IRON DEFICIENCY ANEMIA, UNSPECIFIED IRON DEFICIENCY ANEMIA TYPE: ICD-10-CM

## 2024-08-07 PROBLEM — I48.91 ATRIAL FIBRILLATION: Status: RESOLVED | Noted: 2024-03-13 | Resolved: 2024-08-07

## 2024-08-07 PROBLEM — I47.19 ATRIAL PAROXYSMAL TACHYCARDIA: Status: RESOLVED | Noted: 2022-06-16 | Resolved: 2024-08-07

## 2024-08-07 PROCEDURE — 99214 OFFICE O/P EST MOD 30 MIN: CPT | Mod: ,,, | Performed by: INTERNAL MEDICINE

## 2024-09-22 ENCOUNTER — HOSPITAL ENCOUNTER (EMERGENCY)
Facility: HOSPITAL | Age: 84
Discharge: HOME | End: 2024-09-22
Attending: EMERGENCY MEDICINE
Payer: MEDICARE

## 2024-09-22 VITALS
BODY MASS INDEX: 33.27 KG/M2 | HEART RATE: 82 BPM | DIASTOLIC BLOOD PRESSURE: 75 MMHG | WEIGHT: 212 LBS | SYSTOLIC BLOOD PRESSURE: 170 MMHG | TEMPERATURE: 98 F | OXYGEN SATURATION: 99 % | RESPIRATION RATE: 24 BRPM | HEIGHT: 67 IN
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
## 2024-09-22 DIAGNOSIS — N30.00 ACUTE CYSTITIS WITHOUT HEMATURIA: ICD-10-CM
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
## 2024-09-22 DIAGNOSIS — B37.9 YEAST INFECTION: ICD-10-CM

## 2024-09-22 LAB
ALBUMIN SERPL-MCNC: 3.5 G/DL (ref 3.4–4.8)
ALBUMIN/GLOB SERPL: 0.9 RATIO (ref 1.1–2)
ALP SERPL-CCNC: 95 UNIT/L (ref 40–150)
ALT SERPL-CCNC: 24 UNIT/L (ref 0–55)
ANION GAP SERPL CALC-SCNC: 12 MEQ/L
AST SERPL-CCNC: 13 UNIT/L (ref 5–34)
BACTERIA #/AREA URNS AUTO: ABNORMAL /HPF
BASOPHILS # BLD AUTO: 0.05 X10(3)/MCL
BASOPHILS NFR BLD AUTO: 0.6 %
BILIRUB SERPL-MCNC: 0.6 MG/DL
BILIRUB UR QL STRIP.AUTO: NEGATIVE
BUN SERPL-MCNC: 42.6 MG/DL (ref 8.4–25.7)
CALCIUM SERPL-MCNC: 9.7 MG/DL (ref 8.8–10)
CHLORIDE SERPL-SCNC: 107 MMOL/L (ref 98–107)
CLARITY UR: ABNORMAL
CO2 SERPL-SCNC: 23 MMOL/L (ref 23–31)
COLOR UR AUTO: ABNORMAL
CREAT SERPL-MCNC: 1.95 MG/DL (ref 0.73–1.18)
CREAT/UREA NIT SERPL: 22
EOSINOPHIL # BLD AUTO: 0.52 X10(3)/MCL (ref 0–0.9)
EOSINOPHIL NFR BLD AUTO: 6.2 %
ERYTHROCYTE [DISTWIDTH] IN BLOOD BY AUTOMATED COUNT: 14.6 % (ref 11.5–17)
GFR SERPLBLD CREATININE-BSD FMLA CKD-EPI: 33 ML/MIN/1.73/M2
GLOBULIN SER-MCNC: 3.7 GM/DL (ref 2.4–3.5)
GLUCOSE SERPL-MCNC: 189 MG/DL (ref 82–115)
GLUCOSE UR QL STRIP: >=1000
HCT VFR BLD AUTO: 40.4 % (ref 42–52)
HGB BLD-MCNC: 13 G/DL (ref 14–18)
HGB UR QL STRIP: ABNORMAL
IMM GRANULOCYTES # BLD AUTO: 0.07 X10(3)/MCL (ref 0–0.04)
IMM GRANULOCYTES NFR BLD AUTO: 0.8 %
KETONES UR QL STRIP: NEGATIVE
LEUKOCYTE ESTERASE UR QL STRIP: ABNORMAL
LYMPHOCYTES # BLD AUTO: 1.69 X10(3)/MCL (ref 0.6–4.6)
LYMPHOCYTES NFR BLD AUTO: 20.1 %
MCH RBC QN AUTO: 30.3 PG (ref 27–31)
MCHC RBC AUTO-ENTMCNC: 32.2 G/DL (ref 33–36)
MCV RBC AUTO: 94.2 FL (ref 80–94)
MONOCYTES # BLD AUTO: 0.96 X10(3)/MCL (ref 0.1–1.3)
MONOCYTES NFR BLD AUTO: 11.4 %
NEUTROPHILS # BLD AUTO: 5.12 X10(3)/MCL (ref 2.1–9.2)
NEUTROPHILS NFR BLD AUTO: 60.9 %
NITRITE UR QL STRIP: NEGATIVE
NRBC BLD AUTO-RTO: 0 %
OHS QRS DURATION: 130 MS
OHS QTC CALCULATION: 479 MS
PH UR STRIP: 6 [PH]
PLATELET # BLD AUTO: 215 X10(3)/MCL (ref 130–400)
PMV BLD AUTO: 10.6 FL (ref 7.4–10.4)
POCT GLUCOSE: 178 MG/DL (ref 70–110)
POTASSIUM SERPL-SCNC: 4.2 MMOL/L (ref 3.5–5.1)
PROT SERPL-MCNC: 7.2 GM/DL (ref 5.8–7.6)
PROT UR QL STRIP: 30
RBC # BLD AUTO: 4.29 X10(6)/MCL (ref 4.7–6.1)
RBC #/AREA URNS AUTO: ABNORMAL /HPF
SODIUM SERPL-SCNC: 142 MMOL/L (ref 136–145)
SP GR UR STRIP.AUTO: 1.01 (ref 1–1.03)
SQUAMOUS #/AREA URNS AUTO: ABNORMAL /HPF
TROPONIN I SERPL-MCNC: 0.1 NG/ML (ref 0–0.04)
UROBILINOGEN UR STRIP-ACNC: 0.2
WBC # BLD AUTO: 8.41 X10(3)/MCL (ref 4.5–11.5)
WBC #/AREA URNS AUTO: ABNORMAL /HPF
YEAST UR QL AUTO: ABNORMAL /HPF

## 2024-09-22 PROCEDURE — 93010 ELECTROCARDIOGRAM REPORT: CPT | Mod: ,,, | Performed by: INTERNAL MEDICINE

## 2024-09-22 PROCEDURE — 96365 THER/PROPH/DIAG IV INF INIT: CPT

## 2024-09-22 PROCEDURE — 63600175 PHARM REV CODE 636 W HCPCS: Performed by: EMERGENCY MEDICINE

## 2024-09-22 PROCEDURE — 81003 URINALYSIS AUTO W/O SCOPE: CPT | Performed by: EMERGENCY MEDICINE

## 2024-09-22 PROCEDURE — 80053 COMPREHEN METABOLIC PANEL: CPT | Performed by: EMERGENCY MEDICINE

## 2024-09-22 PROCEDURE — 84484 ASSAY OF TROPONIN QUANT: CPT | Performed by: EMERGENCY MEDICINE

## 2024-09-22 PROCEDURE — 63700000 PHARM REV CODE 250 ALT 637 W/O HCPCS: Performed by: EMERGENCY MEDICINE

## 2024-09-22 PROCEDURE — 93005 ELECTROCARDIOGRAM TRACING: CPT

## 2024-09-22 PROCEDURE — 81001 URINALYSIS AUTO W/SCOPE: CPT | Performed by: EMERGENCY MEDICINE

## 2024-09-22 PROCEDURE — 99285 EMERGENCY DEPT VISIT HI MDM: CPT | Mod: 25

## 2024-09-22 PROCEDURE — 87086 URINE CULTURE/COLONY COUNT: CPT | Performed by: EMERGENCY MEDICINE

## 2024-09-22 PROCEDURE — 25000003 PHARM REV CODE 250: Performed by: EMERGENCY MEDICINE

## 2024-09-22 PROCEDURE — 85025 COMPLETE CBC W/AUTO DIFF WBC: CPT | Performed by: EMERGENCY MEDICINE

## 2024-09-22 RX ORDER — FLUCONAZOLE 100 MG/1
100 TABLET ORAL
Status: COMPLETED | OUTPATIENT
Start: 2024-09-22 | End: 2024-09-22

## 2024-09-22 RX ORDER — CEPHALEXIN 500 MG/1
500 CAPSULE ORAL EVERY 12 HOURS
Qty: 14 CAPSULE | Refills: 0 | Status: SHIPPED | OUTPATIENT
Start: 2024-09-22 | End: 2024-09-29

## 2024-09-22 RX ADMIN — CEFTRIAXONE SODIUM 1 G: 1 INJECTION, POWDER, FOR SOLUTION INTRAMUSCULAR; INTRAVENOUS at 04:09

## 2024-09-22 RX ADMIN — FLUCONAZOLE 100 MG: 100 TABLET ORAL at 04:09

## 2024-09-22 NOTE — ED PROVIDER NOTES
Encounter Date: 9/22/2024       History     Chief Complaint   Patient presents with    Fall     Patient is an 84-year-old male presenting with fall and episode of confusion.  Patient fell in the bathroom he does not quite recall if he slipped lost his balance.  He denies hitting his head or get any loss of consciousness.  He denies any neck pain back pain headache vision changes numbness tingling focal weakness or any complaints.  His daughter lives close by and he has not medical alert bracelet which it went off and she went to his home and she found him on the floor in the bathroom she states when she got there his left eye looks slightly drooped and she felt like his speech was abnormal like he was having difficulty getting the words out but by the time the ambulance arrived it had resolved.  She had not see any lewis facial asymmetry or unilateral weakness.  She does report that he did seem mildly confused.  At this time he is back to his baseline with his mild baseline dementia.        Review of patient's allergies indicates:   Allergen Reactions    Ramipril      tinnitus    Rosiglitazone      Dyspnea      Rosuvastatin      Past Medical History:   Diagnosis Date    Atrial paroxysmal tachycardia     Chronic kidney disease, stage 3     Coronary arteriosclerosis     Diastolic dysfunction     HTN (hypertension)     Iron deficiency anemia, unspecified     Obstructive sleep apnea syndrome     Prostate cancer     Pure hypercholesterolemia     Type 2 diabetes mellitus without complications      Past Surgical History:   Procedure Laterality Date    CBP      CORONARY ARTERY BYPASS GRAFT (CABG)      x 3    Repair of finger laceration      resection of atypical mole       Family History   Problem Relation Name Age of Onset    Cancer Mother          cancer -- gastric and liver    Sudden death Mother      Cancer Father      Sudden death Father      Cancer Sister          breast    Breast cancer Sister      Coronary artery  disease Paternal Grandfather       Social History     Tobacco Use    Smoking status: Never     Passive exposure: Never    Smokeless tobacco: Never   Substance Use Topics    Alcohol use: Never    Drug use: Never     Review of Systems   Constitutional:  Negative for fever.   HENT:  Negative for sore throat.    Respiratory:  Negative for shortness of breath.    Cardiovascular:  Negative for chest pain.   Gastrointestinal:  Negative for nausea.   Genitourinary:  Negative for dysuria.   Musculoskeletal:  Negative for back pain.   Skin:  Negative for rash.   Neurological:  Negative for weakness.   Hematological:  Does not bruise/bleed easily.       Physical Exam     Initial Vitals   BP Pulse Resp Temp SpO2   09/22/24 0332 09/22/24 0329 09/22/24 0329 09/22/24 0329 09/22/24 0332   (!) 195/77 75 16 97.6 °F (36.4 °C) 99 %      MAP       --                Physical Exam    Nursing note and vitals reviewed.  Constitutional: He appears well-developed and well-nourished. He is not diaphoretic. No distress.   HENT:   Head: Normocephalic and atraumatic.   Eyes: Conjunctivae and EOM are normal. Pupils are equal, round, and reactive to light.   Neck: Neck supple.   Cardiovascular:  Normal rate, regular rhythm and normal heart sounds.           Pulmonary/Chest: Breath sounds normal. No respiratory distress. He has no wheezes. He has no rhonchi.   Abdominal: Abdomen is soft. Bowel sounds are normal. He exhibits no distension. There is no abdominal tenderness. There is no rebound and no guarding.   Musculoskeletal:         General: No edema. Normal range of motion.      Cervical back: Neck supple.     Neurological: He is alert. He has normal strength. No cranial nerve deficit or sensory deficit.   Skin: Skin is warm and dry.   Psychiatric: He has a normal mood and affect. Thought content normal.       NIH STROKE SCALE    1a. Level of consciousness:     0 = Alert; keenly responsive.      1b. Level of consciousness questions: The patient  is asked the month and his/her age. The answer must be correct - there is no partial credit for being close. Aphasic and stuporous patients who do not comprehend the questions will score 2. Patients unable to speak because of endotracheal intubation, orotracheal trauma, severe dysarthria from any cause, language barrier, or any other problem not secondary to aphasia are given a 1.       2 = Answers neither question correctly. (November and 74)      1c. Level of consciousness commands:  open and close the eyes and then to  and release the non-paretic hand. Substitute another one step command if the hands cannot be used. Credit is given if an unequivocal attempt is made but not completed due to weakness. If the patient does not respond to command, the task should be demonstrated to him or her (pantomime), and the result scored (ie, follows none, one or two commands). Patients with trauma, amputation, or other physical impediments should be given suitable one-step commands. Only the first attempt is scored.     0 = Performs both tasks correctly.      2. Best gaze: Only horizontal eye movements will be tested. If the patient has a conjugate deviation of the eyes that can be overcome by voluntary or reflexive activity, the score will be 1. If a patient has an isolated peripheral nerve paresis (cranial nerves III, IV or VI), score a 1. Gaze is testable in all aphasic patients. Establishing eye contact and then moving about the patient from side to side will occasionally clarify the presence of a partial gaze palsy.     0 = Normal.        3. Visual: Visual fields (upper and lower quadrants) are tested by confrontation, using finger counting or visual threat, as appropriate. Patients may be encouraged, but if they look at the side of the moving fingers appropriately, this can be scored as normal. If there is unilateral blindness or enucleation, visual fields in the remaining eye are scored. Score 1 only if a clear-cut  asymmetry, including quadrantanopia, is found. If patient is blind from any cause, score 3.      0 = No visual loss.      4. Facial palsy: patient to show teeth or raise eyebrows and close eyes. Score symmetry of grimace in response to noxious stimuli in the poorly responsive or non-comprehending patient.      0 = Normal symmetrical movements.      5. Motor arm: The limb is placed in the appropriate position: extend the arms (palms down) 90 degrees (if sitting) or 45 degrees (if supine). Drift is scored if the arm falls before 10 seconds. The aphasic patient is encouraged using urgency in the voice and pantomime, but not noxious stimulation. Each limb is tested in turn, beginning with the non-paretic arm. Only in the case of amputation or joint fusion at the shoulder, the examiner should record the score as untestable (UN), and clearly write the explanation for this choice.     0 = No drift; limb holds 90 (or 45) degrees for full 10 seconds.      6. Motor leg: The limb is placed in the appropriate position: hold the leg at 30 degrees (always tested supine). Drift is scored if the leg falls before 5 seconds. The aphasic patient is encouraged using urgency in the voice and pantomime, but not noxious stimulation. Each limb is tested in turn, beginning with the non-paretic leg. Only in the case of amputation or joint fusion at the hip, the examiner should record the score as untestable (UN), and clearly write the explanation for this choice.     0 = No drift; leg holds 30-degree position for full 5 seconds.      7. Limb ataxia: Test with eyes open. In case of visual defect, ensure testing is done in intact visual field. The finger-nose-finger and heel-shin tests are performed on both sides, and ataxia is scored only if present out of proportion to weakness. Ataxia is absent in the patient who cannot understand or is paralyzed. Only in the case of amputation or joint fusion, the examiner should record the score as  "untestable (UN), and clearly write the explanation for this choice. In case of blindness, test by having the patient touch nose from extended arm position.     0 = Absent.    8. Sensory: Sensation or grimace to pinprick when tested, or withdrawal from noxious stimulus in the obtunded or aphasic patient. Only sensory loss attributed to stroke is scored as abnormal and the examiner should test as many body areas (arms [not hands], legs, trunk, face) as needed to accurately check for hemisensory loss. A score of 2, "severe or total sensory loss," should only be given when a severe or total loss of sensation can be clearly demonstrated. Stuporous and aphasic patients will, therefore, probably score 1 or 0. The patient with brainstem stroke who has bilateral loss of sensation is scored 2. If the patient does not respond and is quadriplegic, score 2. Patients in a coma (item 1a=3) are automatically given a 2 on this item.     0 = Normal; no sensory loss.    9. Best language: A great deal of information about comprehension will be obtained during the preceding sections of the examination. For this scale item, the patient is asked to describe what is happening in the attached picture, to name the items on the attached naming sheet and to read from the attached list of sentences. Comprehension is judged from responses here, as well as to all of the commands in the preceding general neurological exam. If visual loss interferes with the tests, ask the patient to identify objects placed in the hand, repeat, and produce speech. The intubated patient should be asked to write. The patient in a coma (item 1a=3) will automatically score 3 on this item. The examiner must choose a score for the patient with stupor or limited cooperation, but a score of 3 should be used only if the patient is mute and follows no one-step commands.     0 = No aphasia; normal.        10. Dysarthria: If patient is thought to be normal, an adequate sample " of speech must be obtained by asking patient to read or repeat words from the attached list. If the patient has severe aphasia, the clarity of articulation of spontaneous speech can be rated. Only if the patient is intubated or has other physical barriers to producing speech, the examiner should record the score as untestable (UN), and clearly write an explanation for this choice. Do not tell the patient why he or she is being tested.     0 = Normal.        11. Extinction and inattention (formerly neglect): Sufficient information to identify neglect may be obtained during the prior testing. If the patient has a severe visual loss preventing visual double simultaneous stimulation, and the cutaneous stimuli are normal, the score is normal. If the patient has aphasia but does appear to attend to both sides, the score is normal. The presence of visual spatial neglect or anosognosia may also be taken as evidence of abnormality. Since the abnormality is scored only if present, the item is never untestable.     0 = No abnormality.      TOTAL: 2      ED Course   Procedures  Labs Reviewed   URINALYSIS, REFLEX TO URINE CULTURE - Abnormal       Result Value    Color, UA Straw      Appearance, UA SL CLOUDY (*)     Specific Gravity, UA 1.015      pH, UA 6.0      Protein, UA 30 (*)     Glucose, UA >=1000 (*)     Ketones, UA Negative      Blood, UA Trace (*)     Bilirubin, UA Negative      Urobilinogen, UA 0.2      Nitrites, UA Negative      Leukocyte Esterase, UA Small (*)    URINALYSIS, MICROSCOPIC - Abnormal    Bacteria, UA Few (*)     Yeast, UA Rare (*)     RBC, UA 3-5      WBC, UA 51-99 (*)     Squamous Epithelial Cells, UA Few (*)    COMPREHENSIVE METABOLIC PANEL - Abnormal    Sodium 142      Potassium 4.2      Chloride 107      CO2 23      Glucose 189 (*)     Blood Urea Nitrogen 42.6 (*)     Creatinine 1.95 (*)     Calcium 9.7      Protein Total 7.2      Albumin 3.5      Globulin 3.7 (*)     Albumin/Globulin Ratio 0.9 (*)      Bilirubin Total 0.6      ALP 95      ALT 24      AST 13      eGFR 33      Anion Gap 12.0      BUN/Creatinine Ratio 22     CBC WITH DIFFERENTIAL - Abnormal    WBC 8.41      RBC 4.29 (*)     Hgb 13.0 (*)     Hct 40.4 (*)     MCV 94.2 (*)     MCH 30.3      MCHC 32.2 (*)     RDW 14.6      Platelet 215      MPV 10.6 (*)     Neut % 60.9      Lymph % 20.1      Mono % 11.4      Eos % 6.2      Basophil % 0.6      Lymph # 1.69      Neut # 5.12      Mono # 0.96      Eos # 0.52      Baso # 0.05      IG# 0.07 (*)     IG% 0.8      NRBC% 0.0     TROPONIN I - Abnormal    Troponin-I 0.100 (*)    POCT GLUCOSE - Abnormal    POCT Glucose 178 (*)    CULTURE, URINE   CBC W/ AUTO DIFFERENTIAL    Narrative:     The following orders were created for panel order CBC auto differential.  Procedure                               Abnormality         Status                     ---------                               -----------         ------                     CBC with Differential[[id number]]       Abnormal            Final result                 Please view results for these tests on the individual orders.     EKG Readings: (Independently Interpreted)   Initial Reading: No STEMI.   EKG Interpretation 4:43 AM    Rate: 79  Rhythm: NSR  QRS: WNL  Qtc: WNL  Non specific T wave changes. 1st degree AV block         Imaging Results              CT Cervical Spine Without Contrast (Preliminary result)  Result time 09/22/24 05:01:51      Preliminary result by Derik Allred MD (09/22/24 05:01:51)                   Narrative:    START OF REPORT:  Technique: CT of the cervical spine was performed without intravenous contrast with axial as well as sagittal and coronal images.    Comparison: None.    Dosage Information: Automated Exposure Control was utilized 1425.03 mGy.cm.    Clinical history: Pt found on bathroom floor laying on his right side. Pt denies any pain. Pt states he slipped an fell while in the bathroom.    Findings:  Position: Supine.  Lung  apices: The visualized lung apices appear unremarkable.  Spine:  Mineralization: Within normal limits for age.  Scoliosis: Mild dextroconvex cervical scoliosisis seen involving the lower cervical spine.  Vertebral Fusion: No vertebral fusion is identified.  Listhesis: There is grade I degenerative anterolisthesis of C7 on T1.  Lordosis: Degenerative reversal of the normal cervical lordosis is seen.  Intervertebral disc spaces: Multilevel loss of disc height is seen.  Osteophytes: Moderate multilevel endplate osteophytes are seen.  Endplate Sclerosis: Moderate multilevel endplate sclerosis is seen.  Uncovertebral degenerative changes: Moderate multilevel uncovertebral joint arthrosis is seen.  Facet degenerative changes: Moderate multilevel facet degenerative changes are seen.  Fractures: No acute cervical spine fracture dislocation or subluxation is seen.  Orthopedic Hardware: None.    Miscellaneous:  Mastoid air cells: The visualized mastoid air cells appear clear.  Soft Tissues: Unremarkable.      Impression:  1. No acute cervical spine fracture dislocation or subluxation is seen.  2. Degenerative changes and other details as above.                                         CT Head Without Contrast (Preliminary result)  Result time 09/22/24 04:50:07      Preliminary result by Derik Allred MD (09/22/24 04:50:07)                   Narrative:    START OF REPORT:  Technique: CT of the head was performed without intravenous contrast with axial as well as coronal and sagittal images.    Comparison: None.    Dosage Information: Automated Exposure Control was utilized 1425.03 mGy.cm.    Clinical history: Pt found on bathroom floor laying on his right side. Pt denies any pain. Pt states he slipped an fell while in the bathroom.    Findings:  Hemorrhage: No acute intracranial hemorrhage is seen.  CSF spaces: The ventricles, sulci and basal cisterns all appear prominent consistent with global cerebral atrophy.  Brain  parenchyma: Unremarkable with preservation of the grey white junction throughout. Moderate microvascular change is seen in portions of the periventricular and deep white matter tracts.  Vascular territory infarcts:  Lacunar infarcts: There is a 7.7 mm lacunar infarct seen on in the left anterior limb of the internal capsule.  Cerebellum: Unremarkable.  Vascular: Unremarkable venous sinuses. Moderate to severe atheromatous calcification of the intracranial arteries is seen.  Sella and skull base: The sella appears to be within normal limits for age.  Cerebellopontine angles: Within normal limits.  Herniation: None.  Intracranial calcifications: Incidental note is made of bilateral choroid plexus calcification. Incidental note is made of some pineal region calcification. Incidental note is made of some calcification of the falx.  Calvarium: No acute linear or depressed skull fracture is seen.    Maxillofacial Structures:  Paranasal sinuses: There is iso to hyperdense opacity in the right maxillary sinus. This may reflect acute sinusitis. Correlate clinically. The rest of the paranasal sinuses appear clear.  Orbits: The orbits appear unremarkable.  Zygomatic arches: The zygomatic arches are intact and unremarkable.  Temporal bones and mastoids: The temporal bones and mastoids appear unremarkable.  TMJ: The mandibular condyles appear normally placed with respect to the mandibular fossa.      Impression:  1. No acute intracranial process identified. Details and other findings as noted above.                                         Medications   fluconazole tablet 100 mg (100 mg Oral Given 9/22/24 6216)   cefTRIAXone (Rocephin) 1 g in D5W 100 mL IVPB (MB+) (0 g Intravenous Stopped 9/22/24 6141)     Medical Decision Making  After speaking with the patient's family it is unclear if event that occurred at about 2:00 a.m. was a TIA or just disorientation from the fall.  At this time he is back to his baseline.  His daughter  reports that sometimes he has some mild confusion and can be slightly lost on the year or the day.  Prior to the event today he was in his normal state of health.  He does take a daily baby aspirin    Results were discussed with the patient and his daughter.  I offered admission for TIA workup.  After discussion of the risks and benefit he declines and daughter is okay with honoring his wishes and instead opt to treat the urinary tract infection and follow up with his primary care doctor.    Problems Addressed:  Acute cystitis without hematuria: acute illness or injury  Fall, initial encounter: acute illness or injury  Yeast infection: acute illness or injury    Amount and/or Complexity of Data Reviewed  Labs: ordered. Decision-making details documented in ED Course.  Radiology: ordered. Decision-making details documented in ED Course.  ECG/medicine tests: ordered and independent interpretation performed. Decision-making details documented in ED Course.    Risk  Prescription drug management.               ED Course as of 09/22/24 0555   Sun Sep 22, 2024   0441 WBC, UA(!): 51-99 [GM]   0441 Yeast, UA(!): Rare [GM]   0451 There is a 7.7 mm lacunar infarct seen on in the left anterior limb of the internal capsule. This does appear to have been described on the head CT from 2012. [GM]   0515 Creatinine(!): 1.95 [GM]      ED Course User Index  [GM] Bisi Dumont MD                             Clinical Impression:  Final diagnoses:  [W19.XXXA] Fall, initial encounter (Primary)  [N30.00] Acute cystitis without hematuria  [B37.9] Yeast infection          ED Disposition Condition    Discharge Stable          ED Prescriptions       Medication Sig Dispense Start Date End Date Auth. Provider    cephALEXin (KEFLEX) 500 MG capsule Take 1 capsule (500 mg total) by mouth every 12 (twelve) hours. for 7 days 14 capsule 9/22/2024 9/29/2024 Bisi Dumont MD          Follow-up Information       Follow up With Specialties Details  Why Contact Info    Renuka Lowery MD Internal Medicine Schedule an appointment as soon as possible for a visit  If symptoms worsen, return to the ED [address] [facility] [address]  [phone]               Bisi Dumont MD  09/22/24 0602

## 2024-09-22 NOTE — ED TRIAGE NOTES
Pt found on bathroom floor laying on his right side. Pt denies any pain. Pt states he slipped an fell while in the bathroom.

## 2024-09-23 ENCOUNTER — PATIENT OUTREACH (OUTPATIENT)
Dept: EMERGENCY MEDICINE | Facility: HOSPITAL | Age: 84
End: 2024-09-23
Payer: MEDICARE

## 2024-09-23 LAB
BACTERIA UR CULT: ABNORMAL

## 2024-09-25 ENCOUNTER — TELEPHONE (OUTPATIENT)
Dept: INTERNAL MEDICINE | Facility: CLINIC | Age: 84
End: 2024-09-25
Payer: MEDICARE

## 2024-09-25 NOTE — TELEPHONE ENCOUNTER
----- Message from Adriane Simeon sent at 9/25/2024 10:27 AM CDT -----  Who Called: Damon Moran    Caller is requesting assistance/information from provider's office.  Preferred Method of Contact: Phone Call  Patient's Preferred Phone Number on File: [phone]   Best Call Back Number, if different:  Additional Information:  medical advice, pt daughter-destinee called to discuss pt recent ER visit ( recent fall and UTI) and would like to discuss current symptoms and also stated that she will collect another urine sample at home and is also asking if this should be retested or should they proceed after medication given kicks in, please advise, thanks  
I spoke with patient's daughter, Amrita. I told her it is protocol for patient to have hospital follow up appt. She verbalized understanding. She requested we do virtual appt. Ok'd with Dr. Lowery. I scheduled it for tomorrow at 10am.   
Patient is not scheduled for Hospital Follow Up.  Do you want to repeat testing?  
full weight-bearing

## 2024-09-26 ENCOUNTER — OFFICE VISIT (OUTPATIENT)
Dept: INTERNAL MEDICINE | Facility: CLINIC | Age: 84
End: 2024-09-26
Payer: MEDICARE

## 2024-09-26 ENCOUNTER — TELEPHONE (OUTPATIENT)
Dept: INTERNAL MEDICINE | Facility: CLINIC | Age: 84
End: 2024-09-26

## 2024-09-26 VITALS
BODY MASS INDEX: 33.2 KG/M2 | HEART RATE: 61 BPM | SYSTOLIC BLOOD PRESSURE: 106 MMHG | RESPIRATION RATE: 18 BRPM | OXYGEN SATURATION: 94 % | HEIGHT: 67 IN | DIASTOLIC BLOOD PRESSURE: 56 MMHG
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
## 2024-09-26 NOTE — ASSESSMENT & PLAN NOTE
He was evaluated and treated in the ER for a possible UTI.  I reviewed and culture and it grew a small CFU of several bacteria -- thought to maybe be contaminated.  The urine did look grossly infected on the u/a however.  He was treated with Keflex.  And he seems to have returned to his baseline level of function.  BP is running low.  And his HR is running low as well.  Will reduce the toprol xl to 25 mg daily and continue the other meds.  Daughter will keep a list of is vitals and bring to the next visit.

## 2024-09-26 NOTE — TELEPHONE ENCOUNTER
----- Message from Adriane Simeon sent at 9/26/2024 10:57 AM CDT -----  Who Called: Damon Moran    Caller is requesting assistance/information from provider's office.  Preferred Method of Contact: Phone Call  Patient's Preferred Phone Number on File: [phone]   Best Call Back Number, if different:  Additional Information: medical advice, pt daughter -destinee stated pt  has already taken medication  (metoprolol succinate (TOPROL-XL) 50 MG 24 hr tablet) 40-50 tablets  already this morning and will make adjustments to medication tomorrow morning  per Dr Lowery request, pt daughter is requesting a call back to discuss/confirm, please advise, thanks

## 2024-09-26 NOTE — PROGRESS NOTES
Subjective:      Chief Complaint: Hospital Follow Up (He had UTI and fall on Sunday 9/22/24 and went to [facility] ortho after daughter found him at 2am after medical alert tag alerted her )      HPI:Thisis a video visit for a hospital f/u for being found down on the ground in the middle of the night.  He was treated for a UTI  with Keflex.  He went home on the same day.        BP has been 106/66 with a HR of 61.  His oxygen sat was 91%.  Today his BP was 98/55 with a HR of 53.  His O2 sat today was 96%.  Problem Noted   Valvular Heart Disease 8/7/2024   Anemia 3/13/2024   Mild Cognitive Impairment 3/13/2024   Hyperparathyroidism, Unspecified 8/9/2023   Chronic Diastolic Heart Failure 8/9/2023   Fall 1/19/2023   Shoulder Pain 1/19/2023   Hematochezia 1/9/2023   Stage 3b Chronic Kidney Disease 8/11/2022    He is followed by Dr. Yan     Type 2 Diabetes Mellitus With Stage 3a Chronic Kidney Disease, Without Long-Term Current Use of Insulin 6/16/2022   Severe Obesity (Bmi 35.0-39.9) With Comorbidity 6/16/2022   Essential (Primary) Hypertension 6/16/2022   Hyperlipidemia 6/16/2022   Coronary Artery Disease 6/16/2022    S/p CABG, followed by Dr. Gaston.     Male Hypogonadism 6/16/2022    Patient elected not to take hormone replacement.     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     Sleep Apnea 6/16/2022   Oa (Osteoarthritis) of Knee 6/16/2022   Tinnitus 6/16/2022   Sciatica 6/16/2022   Encounter for Medicare Annual Wellness Exam (Resolved) 8/11/2022   Atrial Fibrillation (Resolved) 3/13/2024   Lower GI Bleed (Resolved) 1/8/2023   Atrial Paroxysmal Tachycardia (Resolved) 6/16/2022        The patient's Health Maintenance was reviewed and the following appears to be due:   Health  Maintenance Due   Topic Date Due    TETANUS VACCINE  Never done    Shingles Vaccine (1 of 2) Never done    Eye Exam  11/04/2020    Lipid Panel  08/04/2024    Influenza Vaccine (1) 09/01/2024       Past Medical History:  Past Medical History:   Diagnosis Date    Atrial paroxysmal tachycardia     Chronic kidney disease, stage 3     Coronary arteriosclerosis     Diastolic dysfunction     HTN (hypertension)     Iron deficiency anemia, unspecified     Obstructive sleep apnea syndrome     Prostate cancer     Pure hypercholesterolemia     Type 2 diabetes mellitus without complications      Review of patient's allergies indicates:   Allergen Reactions    Ramipril      tinnitus    Rosiglitazone      Dyspnea      Rosuvastatin      Current Outpatient Medications on File Prior to Visit   Medication Sig Dispense Refill    allopurinoL (ZYLOPRIM) 300 MG tablet Take 0.5 tablets (150 mg total) by mouth once daily. 90 tablet 3    aspirin 81 MG Chew Take 81 mg by mouth once daily.      atorvastatin (LIPITOR) 80 MG tablet Take 1 tablet (80 mg total) by mouth once daily. 90 tablet 3    blood sugar diagnostic Strp 1 strip by Misc.(Non-Drug; Combo Route) route 2 (two) times daily with meals. 100 strip 11    cephALEXin (KEFLEX) 500 MG capsule Take 1 capsule (500 mg total) by mouth every 12 (twelve) hours. for 7 days 14 capsule 0    citalopram (CELEXA) 10 MG tablet Take 1 tablet (10 mg total) by mouth once daily. 30 tablet 11    docusate sodium (COLACE) 50 MG capsule Take 1 capsule (50 mg total) by mouth 2 (two) times daily. 60 capsule 3    empagliflozin (JARDIANCE) 25 mg tablet Take 1 tablet (25 mg total) by mouth once daily. 90 tablet 3    ezetimibe (ZETIA) 10 mg tablet Take 10 mg by mouth once daily.      furosemide (LASIX) 40 MG tablet Take 40 mg by mouth every Mon, Wed, Fri.      isosorbide mononitrate (IMDUR) 30 MG 24 hr tablet Take 15 mg by mouth once daily.      Lactobacillus acidophilus 500 million cell Cap Take 1 capsule by mouth  "nightly. 30 capsule 3    lancets Misc 1 lancet by Misc.(Non-Drug; Combo Route) route 2 (two) times daily with meals. 100 each 11    linaGLIPtin (TRADJENTA) 5 mg Tab tablet Take 1 tablet (5 mg total) by mouth once daily. 90 tablet 3    lisinopriL (PRINIVIL,ZESTRIL) 20 MG tablet Take 20 mg by mouth.      metoprolol succinate (TOPROL-XL) 50 MG 24 hr tablet Take 25 mg by mouth once daily.      potassium chloride SA (K-DUR,KLOR-CON) 20 MEQ tablet Take 20 mEq by mouth once daily.      blood-glucose meter kit Use as instructed (Patient taking differently: 1 each by Other route once daily. Use as instructed) 1 each 0    lancing device Misc 1 Device by Misc.(Non-Drug; Combo Route) route 2 (two) times daily with meals. 1 each 0     No current facility-administered medications on file prior to visit.       Review of Systems   Constitutional:  Negative for activity change and unexpected weight change.   HENT:  Negative for hearing loss, rhinorrhea and trouble swallowing.    Eyes:  Negative for discharge and visual disturbance.   Respiratory:  Negative for chest tightness and wheezing.    Cardiovascular:  Negative for chest pain and palpitations.   Gastrointestinal:  Negative for blood in stool, constipation, diarrhea and vomiting.   Endocrine: Negative for polydipsia and polyuria.   Genitourinary:  Negative for difficulty urinating, hematuria and urgency.   Musculoskeletal:  Negative for arthralgias, joint swelling and neck pain.   Neurological:  Negative for weakness and headaches.   Psychiatric/Behavioral:  Negative for confusion and dysphoric mood.        Objective:   BP (!) 106/56 (BP Location: Left arm, Patient Position: Sitting, BP Method: Small (Manual)) Comment (BP Method): patient reported  Pulse 61   Resp 18   Ht 5' 7.01" (1.702 m)   SpO2 (!) 94%   BMI 33.20 kg/m²     Physical Exam  Constitutional:       Appearance: Normal appearance.   Pulmonary:      Effort: Pulmonary effort is normal.   Neurological:      " General: No focal deficit present.      Mental Status: He is alert.   Psychiatric:         Mood and Affect: Mood normal.         Behavior: Behavior normal.         Thought Content: Thought content normal.         Judgment: Judgment normal.       Assessment and Plan:     Fall  He was evaluated and treated in the ER for a possible UTI.  I reviewed and culture and it grew a small CFU of several bacteria -- thought to maybe be contaminated.  The urine did look grossly infected on the u/a however.  He was treated with Keflex.  And he seems to have returned to his baseline level of function.  BP is running low.  And his HR is running low as well.  Will reduce the toprol xl to 25 mg daily and continue the other meds.  Daughter will keep a list of is vitals and bring to the next visit.        The patient location is: at his home here in [place]  The chief complaint leading to consultation is: f/u ER visit    Visit type: audiovisual    Face to Face time with patient: 20   minutes of total time spent on the encounter, which includes face to face time and non-face to face time preparing to see the patient (eg, review of tests), Obtaining and/or reviewing separately obtained history, Documenting clinical information in the electronic or other health record, Independently interpreting results (not separately reported) and communicating results to the patient/family/caregiver, or Care coordination (not separately reported).         Each patient to whom he or she provides medical services by telemedicine is:  (1) informed of the relationship between the physician and patient and the respective role of any other health care provider with respect to management of the patient; and (2) notified that he or she may decline to receive medical services by telemedicine and may withdraw from such care at any time.    Notes:          [unfilled]  No orders of the defined types were placed in this encounter.

## 2024-09-26 NOTE — TELEPHONE ENCOUNTER
I called and spoke with patient's daughter, Amrita. She said she will cut the metoprolol 50mg in half and start giving patient 25mg per Dr. Lowery's recommendation. She has about 50 pills at home and will cut them and call when she needs a new rx.

## 2024-11-06 ENCOUNTER — TELEPHONE (OUTPATIENT)
Dept: INTERNAL MEDICINE | Facility: CLINIC | Age: 84
End: 2024-11-06
Payer: MEDICARE

## 2024-11-06 ENCOUNTER — LAB VISIT (OUTPATIENT)
Dept: LAB | Facility: HOSPITAL | Age: 84
End: 2024-11-06
Attending: INTERNAL MEDICINE
Payer: MEDICARE

## 2024-11-06 DIAGNOSIS — E78.5 HYPERLIPIDEMIA, UNSPECIFIED HYPERLIPIDEMIA TYPE: ICD-10-CM

## 2024-11-06 DIAGNOSIS — E11.22 TYPE 2 DIABETES MELLITUS WITH STAGE 3A CHRONIC KIDNEY DISEASE, WITHOUT LONG-TERM CURRENT USE OF INSULIN: ICD-10-CM

## 2024-11-06 DIAGNOSIS — N18.31 TYPE 2 DIABETES MELLITUS WITH STAGE 3A CHRONIC KIDNEY DISEASE, WITHOUT LONG-TERM CURRENT USE OF INSULIN: ICD-10-CM

## 2024-11-06 LAB
CHOLEST SERPL-MCNC: 113 MG/DL
CHOLEST/HDLC SERPL: 3 {RATIO} (ref 0–5)
EST. AVERAGE GLUCOSE BLD GHB EST-MCNC: 211.6 MG/DL
HBA1C MFR BLD: 9 %
HDLC SERPL-MCNC: 37 MG/DL (ref 35–60)
LDLC SERPL CALC-MCNC: 57 MG/DL (ref 50–140)
TRIGL SERPL-MCNC: 97 MG/DL (ref 34–140)
VLDLC SERPL CALC-MCNC: 19 MG/DL

## 2024-11-06 PROCEDURE — 80061 LIPID PANEL: CPT

## 2024-11-06 PROCEDURE — 83036 HEMOGLOBIN GLYCOSYLATED A1C: CPT

## 2024-11-06 PROCEDURE — 36415 COLL VENOUS BLD VENIPUNCTURE: CPT

## 2024-11-06 NOTE — TELEPHONE ENCOUNTER
"Amrita is patient's daughter, stated patient's blood sugars running 190's.  Labs were done this morning and AIC is at 9.  Amrita "Goggled" reasons for elevated AIC and it mentioned low magnesium.  Amrita stated patient is given a sugar free diet for most of the time and is on a low carb diet as well.   Patient has an appt scheduled with Dr. Lowery on 11-13-24.    Please advise.  "

## 2024-11-06 NOTE — TELEPHONE ENCOUNTER
----- Message from [employer] sent at 11/6/2024  3:05 PM CST -----  .Type:  Patient Returning Call    Who Called:Amrita  Who Left Message for Patient:wife  Does the patient know what this is regarding?:labs and BP  Would the patient rather a call back or a response via [facility]?   Best Call Back Number:[phone]  Additional Information: Please call back about labs and BP

## 2024-11-13 ENCOUNTER — TELEPHONE (OUTPATIENT)
Dept: INTERNAL MEDICINE | Facility: CLINIC | Age: 84
End: 2024-11-13

## 2024-11-13 ENCOUNTER — OFFICE VISIT (OUTPATIENT)
Dept: INTERNAL MEDICINE | Facility: CLINIC | Age: 84
End: 2024-11-13
Payer: MEDICARE

## 2024-11-13 VITALS
BODY MASS INDEX: 32.12 KG/M2 | WEIGHT: 204.63 LBS | SYSTOLIC BLOOD PRESSURE: 113 MMHG | OXYGEN SATURATION: 95 % | DIASTOLIC BLOOD PRESSURE: 55 MMHG | HEIGHT: 67 IN | HEART RATE: 62 BPM

## 2024-11-13 DIAGNOSIS — I10 ESSENTIAL (PRIMARY) HYPERTENSION: ICD-10-CM

## 2024-11-13 DIAGNOSIS — N18.31 TYPE 2 DIABETES MELLITUS WITH STAGE 3A CHRONIC KIDNEY DISEASE, WITHOUT LONG-TERM CURRENT USE OF INSULIN: Primary | ICD-10-CM

## 2024-11-13 DIAGNOSIS — E11.22 TYPE 2 DIABETES MELLITUS WITH STAGE 3A CHRONIC KIDNEY DISEASE, WITHOUT LONG-TERM CURRENT USE OF INSULIN: Primary | ICD-10-CM

## 2024-11-13 DIAGNOSIS — E78.5 HYPERLIPIDEMIA, UNSPECIFIED HYPERLIPIDEMIA TYPE: ICD-10-CM

## 2024-11-13 DIAGNOSIS — N18.32 STAGE 3B CHRONIC KIDNEY DISEASE: ICD-10-CM

## 2024-11-13 DIAGNOSIS — G47.30 SLEEP APNEA, UNSPECIFIED TYPE: ICD-10-CM

## 2024-11-13 RX ORDER — EZETIMIBE 10 MG/1
10 TABLET ORAL DAILY
Qty: 90 TABLET | Refills: 3 | Status: SHIPPED | OUTPATIENT
Start: 2024-11-13

## 2024-11-13 RX ORDER — GLIMEPIRIDE 4 MG/1
2 TABLET ORAL
Start: 2024-11-13 | End: 2025-11-13

## 2024-11-13 NOTE — PROGRESS NOTES
Subjective:      Chief Complaint: Follow-up (3mo/Discuss labs )      HPI:This is a f/u for htn, chf, ckd, dm, cad.  Occ when she checks his BP, its up to 158 systolic, but it can be as low as 107/51 with a HR of 54.  She will fax a log of his pressures.  She is checking his CBG when his daughter is at his house.  It can be 275 after breakfast, as low as 154 fasting before breakfast.  His daughter admits that she is letting him cheat on his diet.  Problem Noted   Valvular Heart Disease 8/7/2024   Anemia 3/13/2024   Mild Cognitive Impairment 3/13/2024   Hyperparathyroidism, Unspecified 8/9/2023   Chronic Diastolic Heart Failure 8/9/2023   Fall 1/19/2023   Shoulder Pain 1/19/2023   Hematochezia 1/9/2023   Stage 3b Chronic Kidney Disease 8/11/2022    He is followed by Dr. Yan     Type 2 Diabetes Mellitus With Stage 3a Chronic Kidney Disease, Without Long-Term Current Use of Insulin 6/16/2022   Severe Obesity (Bmi 35.0-39.9) With Comorbidity 6/16/2022   Essential (Primary) Hypertension 6/16/2022   Hyperlipidemia 6/16/2022   Coronary Artery Disease 6/16/2022    S/p CABG, followed by Dr. Gaston.     Male Hypogonadism 6/16/2022    Patient elected not to take hormone replacement.     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     Sleep Apnea 6/16/2022    Daughter reports he has the machine but hasn't used it in forever.     Oa (Osteoarthritis) of Knee 6/16/2022   Tinnitus 6/16/2022   Sciatica 6/16/2022   Encounter for Medicare Annual Wellness Exam (Resolved) 8/11/2022   Atrial Fibrillation (Resolved) 3/13/2024   Lower GI Bleed (Resolved) 1/8/2023   Atrial Paroxysmal Tachycardia (Resolved) 6/16/2022        The patient's Health Maintenance was reviewed and the following appears  to be due:   Health Maintenance Due   Topic Date Due    TETANUS VACCINE  Never done    Eye Exam  11/04/2020       Past Medical History:  Past Medical History:   Diagnosis Date    Atrial paroxysmal tachycardia     Chronic kidney disease, stage 3     Coronary arteriosclerosis     Diastolic dysfunction     HTN (hypertension)     Iron deficiency anemia, unspecified     Obstructive sleep apnea syndrome     Prostate cancer     Pure hypercholesterolemia     Type 2 diabetes mellitus without complications      Review of patient's allergies indicates:   Allergen Reactions    Ramipril      tinnitus    Rosiglitazone      Dyspnea      Rosuvastatin      Current Outpatient Medications on File Prior to Visit   Medication Sig Dispense Refill    allopurinoL (ZYLOPRIM) 300 MG tablet Take 0.5 tablets (150 mg total) by mouth once daily. 90 tablet 3    aspirin 81 MG Chew Take 81 mg by mouth once daily.      atorvastatin (LIPITOR) 80 MG tablet Take 1 tablet (80 mg total) by mouth once daily. 90 tablet 3    blood sugar diagnostic Strp 1 strip by Misc.(Non-Drug; Combo Route) route 2 (two) times daily with meals. 100 strip 11    docusate sodium (COLACE) 50 MG capsule Take 1 capsule (50 mg total) by mouth 2 (two) times daily. 60 capsule 3    empagliflozin (JARDIANCE) 25 mg tablet Take 1 tablet (25 mg total) by mouth once daily. 90 tablet 3    ezetimibe (ZETIA) 10 mg tablet Take 10 mg by mouth once daily.      furosemide (LASIX) 40 MG tablet Take 40 mg by mouth every Mon, Wed, Fri.      isosorbide mononitrate (IMDUR) 30 MG 24 hr tablet Take 15 mg by mouth once daily.      Lactobacillus acidophilus 500 million cell Cap Take 1 capsule by mouth nightly. 30 capsule 3    lancets Misc 1 lancet by Misc.(Non-Drug; Combo Route) route 2 (two) times daily with meals. 100 each 11    linaGLIPtin (TRADJENTA) 5 mg Tab tablet Take 1 tablet (5 mg total) by mouth once daily. 90 tablet 3    metoprolol succinate (TOPROL-XL) 50 MG 24 hr tablet Take 25 mg by mouth  "once daily.      blood-glucose meter kit Use as instructed (Patient taking differently: 1 each by Other route once daily. Use as instructed) 1 each 0    lancing device Misc 1 Device by Misc.(Non-Drug; Combo Route) route 2 (two) times daily with meals. 1 each 0    [DISCONTINUED] citalopram (CELEXA) 10 MG tablet Take 1 tablet (10 mg total) by mouth once daily. 30 tablet 11    [DISCONTINUED] lisinopriL (PRINIVIL,ZESTRIL) 20 MG tablet Take 20 mg by mouth.      [DISCONTINUED] potassium chloride SA (K-DUR,KLOR-CON) 20 MEQ tablet Take 20 mEq by mouth once daily.       No current facility-administered medications on file prior to visit.       Review of Systems   Constitutional:  Negative for activity change and unexpected weight change.   HENT:  Negative for hearing loss, rhinorrhea and trouble swallowing.    Eyes:  Negative for discharge and visual disturbance.   Respiratory:  Negative for chest tightness, shortness of breath and wheezing.    Cardiovascular:  Negative for chest pain and palpitations.   Gastrointestinal:  Negative for blood in stool, constipation, diarrhea and vomiting.   Endocrine: Positive for polyuria. Negative for polydipsia.   Genitourinary:  Negative for difficulty urinating, hematuria and urgency.        Daughter states its incontinent.   Musculoskeletal:  Negative for arthralgias, joint swelling and neck pain.   Neurological:  Negative for weakness and headaches.   Psychiatric/Behavioral:  Negative for confusion and dysphoric mood.        Objective:   BP (!) 113/55 (BP Location: Right arm, Patient Position: Sitting)   Pulse 62   Ht 5' 6.97" (1.701 m)   Wt 92.8 kg (204 lb 9.6 oz) Comment: patient reported  SpO2 95%   BMI 32.07 kg/m²     Physical Exam  Constitutional:       Appearance: Normal appearance.   Neurological:      General: No focal deficit present.      Mental Status: He is alert.   Psychiatric:         Mood and Affect: Mood normal.         Behavior: Behavior normal.         Thought " Content: Thought content normal.         Judgment: Judgment normal.       Assessment and Plan:     Type 2 diabetes mellitus with stage 3a chronic kidney disease, without long-term current use of insulin  Continue the jardiance and tradjenta.  And resume the glimepiride at 2 mg po daily with breakfast.    Stage 3b chronic kidney disease  Was stable on labs in September.    Essential (primary) hypertension  Stable, continue metoprolol, isosorbide.    Hyperlipidemia  Controlled.  Continue atorvastatin.      No follow-ups on file.Keep January f/u    Medications Ordered This Encounter   Medications    glimepiride (AMARYL) 4 MG tablet     Sig: Take 0.5 tablets (2 mg total) by mouth before breakfast.     [unfilled]    The attending portion of this evaluation, treatment, and documentation was performed per Renuka Lowery MD via Telemedicine AudioVisual using the secure [employer] software platform with two-way audio/video. The provider was located in my officeand the patient is located in the at home. The aforementioned video software was utilized to document the relevant history and physical exam.

## 2024-11-13 NOTE — TELEPHONE ENCOUNTER
----- Message from [employer] sent at 11/13/2024  7:57 AM CST -----  Who Called: Damon Moran    Caller is requesting assistance/information from provider's office.    Symptoms (please be specific): n/a   How long has patient had these symptoms:  n/a  List of preferred pharmacies on file (remove unneeded): [unfilled]  If different, enter pharmacy into here including location and phone number: n/a      Preferred Method of Contact: Phone Call  Patient's Preferred Phone Number on File: [phone]   Best Call Back Number, if different:  Additional Information: medical advice, pt  daughter-destinee , called to have appt 11/13/24 @ 1040 changed to tele-med visit due to bad weather, please advise, thanks

## 2024-11-13 NOTE — TELEPHONE ENCOUNTER
----- Message from Yekra sent at 11/13/2024 11:58 AM CST -----  Regarding: refill  Who Called: Chikis, pts wife    Refill or New Rx:Refill  RX Name and Strength:ezetimibe (ZETIA) 10 mg tablet    How is the patient currently taking it? (ex. 1XDay):  Is this a 30 day or 90 day RX:  Local or Mail Order:    List of preferred pharmacies on file (remove unneeded): [facility] [address] [place]    If different Pharmacy is requested, enter Pharmacy information here including location and phone number:    Ordering Provider:      Preferred Method of Contact: Phone Call  Patient's Preferred Phone Number on File: [phone]   Best Call Back Number, if different:      Additional Information: stated that pt gets meds rx from Dr. Gaston (cardiology), but is needing a refill. Wanted to know if he can get it filled by pcp. Stated that she called Dr. Gaston's office to have filled but they are not answering. Stated that pt sees cardio 2x a year, but wanted to know if pcp can take over rx. Requested a call back on if pcp wants to take over rx for Dr. Gaston. Please advise

## 2024-11-13 NOTE — ASSESSMENT & PLAN NOTE
Continue the jardiance and tradjenta.  And resume the glimepiride at 2 mg po daily with breakfast.

## 2024-11-13 NOTE — TELEPHONE ENCOUNTER
Patient's daughter, Amrita notified. She said the pharmacy was working on a refill that was sent by Dr. Gaston right now but they will use the rx from Dr. Lowery for future refills.

## 2024-11-13 NOTE — TELEPHONE ENCOUNTER
I have signed for the following orders and/or meds.  Please inform the patient.    No orders of the defined types were placed in this encounter.    Medications Ordered This Encounter   Medications    ezetimibe (ZETIA) 10 mg tablet     Sig: Take 1 tablet (10 mg total) by mouth once daily.     Dispense:  90 tablet     Refill:  3

## 2024-11-30 ENCOUNTER — HOSPITAL ENCOUNTER (INPATIENT)
Facility: HOSPITAL | Age: 84
LOS: 5 days | Discharge: HOSPICE | DRG: 963 | End: 2024-12-05
Attending: EMERGENCY MEDICINE | Admitting: SURGERY
Payer: MEDICARE

## 2024-11-30 DIAGNOSIS — S06.300A TRAUMATIC INTRACRANIAL HEMORRHAGE WITHOUT LOSS OF CONSCIOUSNESS, INITIAL ENCOUNTER: ICD-10-CM

## 2024-11-30 DIAGNOSIS — I21.4 NSTEMI (NON-ST ELEVATED MYOCARDIAL INFARCTION): ICD-10-CM

## 2024-11-30 DIAGNOSIS — S06.5X0A TRAUMATIC SUBDURAL HEMATOMA WITHOUT LOSS OF CONSCIOUSNESS, INITIAL ENCOUNTER: Primary | ICD-10-CM

## 2024-11-30 DIAGNOSIS — S06.9X0A: ICD-10-CM

## 2024-11-30 DIAGNOSIS — I25.10 CAD (CORONARY ARTERY DISEASE): ICD-10-CM

## 2024-11-30 DIAGNOSIS — N17.9 ACUTE KIDNEY INJURY SUPERIMPOSED ON CHRONIC KIDNEY DISEASE: ICD-10-CM

## 2024-11-30 DIAGNOSIS — Z66 DNR (DO NOT RESUSCITATE): ICD-10-CM

## 2024-11-30 DIAGNOSIS — M25.519 SHOULDER PAIN, UNSPECIFIED CHRONICITY, UNSPECIFIED LATERALITY: ICD-10-CM

## 2024-11-30 DIAGNOSIS — R09.89 SUSPECTED CEREBROVASCULAR ACCIDENT (CVA): ICD-10-CM

## 2024-11-30 DIAGNOSIS — N18.9 ACUTE KIDNEY INJURY SUPERIMPOSED ON CHRONIC KIDNEY DISEASE: ICD-10-CM

## 2024-11-30 LAB
ABO + RH BLD: NORMAL
ABORH RETYPE: NORMAL
ABS NEUT (OLG): 12.4 X10(3)/MCL (ref 2.1–9.2)
ALBUMIN SERPL-MCNC: 3.5 G/DL (ref 3.4–4.8)
ALBUMIN/GLOB SERPL: 1 RATIO (ref 1.1–2)
ALP SERPL-CCNC: 91 UNIT/L (ref 40–150)
ALT SERPL-CCNC: 28 UNIT/L (ref 0–55)
AMPHET UR QL SCN: NEGATIVE
ANION GAP SERPL CALC-SCNC: 17 MEQ/L
ANISOCYTOSIS BLD QL SMEAR: ABNORMAL
APTT PPP: 27.8 SECONDS (ref 23.2–33.7)
AST SERPL-CCNC: 24 UNIT/L (ref 5–34)
BACTERIA #/AREA URNS AUTO: ABNORMAL /HPF
BARBITURATE SCN PRESENT UR: NEGATIVE
BENZODIAZ UR QL SCN: NEGATIVE
BILIRUB SERPL-MCNC: 0.9 MG/DL
BILIRUB UR QL STRIP.AUTO: NEGATIVE
BLD PROD TYP BPU: NORMAL
BLOOD UNIT EXPIRATION DATE: NORMAL
BLOOD UNIT TYPE CODE: 5100
BUN SERPL-MCNC: 34.5 MG/DL (ref 8.4–25.7)
BURR CELLS (OLG): ABNORMAL
CALCIUM SERPL-MCNC: 9.2 MG/DL (ref 8.8–10)
CANNABINOIDS UR QL SCN: NEGATIVE
CHLORIDE SERPL-SCNC: 106 MMOL/L (ref 98–107)
CLARITY UR: CLEAR
CO2 SERPL-SCNC: 18 MMOL/L (ref 23–31)
COCAINE UR QL SCN: NEGATIVE
COLOR UR AUTO: COLORLESS
CREAT SERPL-MCNC: 2.23 MG/DL (ref 0.72–1.25)
CREAT/UREA NIT SERPL: 15
CROSSMATCH INTERPRETATION: NORMAL
DISPENSE STATUS: NORMAL
EOSINOPHIL NFR BLD MANUAL: 0.17 X10(3)/MCL (ref 0–0.9)
EOSINOPHIL NFR BLD MANUAL: 1 %
ERYTHROCYTE [DISTWIDTH] IN BLOOD BY AUTOMATED COUNT: 15 % (ref 11.5–17)
ETHANOL SERPL-MCNC: <10 MG/DL
FENTANYL UR QL SCN: NEGATIVE
GFR SERPLBLD CREATININE-BSD FMLA CKD-EPI: 28 ML/MIN/1.73/M2
GLOBULIN SER-MCNC: 3.5 GM/DL (ref 2.4–3.5)
GLUCOSE SERPL-MCNC: 221 MG/DL (ref 82–115)
GLUCOSE UR QL STRIP: ABNORMAL
GROUP & RH: NORMAL
HCT VFR BLD AUTO: 39 % (ref 42–52)
HGB BLD-MCNC: 12.3 G/DL (ref 14–18)
HGB UR QL STRIP: ABNORMAL
INDIRECT COOMBS: NORMAL
INR PPP: 1.1
INSTRUMENT WBC (OLG): 17.46 X10(3)/MCL
KETONES UR QL STRIP: ABNORMAL
LACTATE SERPL-SCNC: 1.2 MMOL/L (ref 0.5–2.2)
LACTATE SERPL-SCNC: 2.9 MMOL/L (ref 0.5–2.2)
LACTATE SERPL-SCNC: 5.5 MMOL/L (ref 0.5–2.2)
LEUKOCYTE ESTERASE UR QL STRIP: NEGATIVE
LYMPHOCYTES NFR BLD MANUAL: 19 %
LYMPHOCYTES NFR BLD MANUAL: 3.32 X10(3)/MCL
MACROCYTES BLD QL SMEAR: ABNORMAL
MCH RBC QN AUTO: 30.2 PG (ref 27–31)
MCHC RBC AUTO-ENTMCNC: 31.5 G/DL (ref 33–36)
MCV RBC AUTO: 95.8 FL (ref 80–94)
MDMA UR QL SCN: NEGATIVE
MONOCYTES NFR BLD MANUAL: 1.75 X10(3)/MCL (ref 0.1–1.3)
MONOCYTES NFR BLD MANUAL: 10 %
MUCOUS THREADS URNS QL MICRO: ABNORMAL /LPF
NEUTROPHILS NFR BLD MANUAL: 71 %
NITRITE UR QL STRIP: NEGATIVE
NRBC BLD AUTO-RTO: 0 %
OPIATES UR QL SCN: NEGATIVE
PCP UR QL: NEGATIVE
PH UR STRIP: 5.5 [PH]
PH UR: 5.5 [PH] (ref 3–11)
PLATELET # BLD AUTO: 188 X10(3)/MCL (ref 130–400)
PLATELET # BLD EST: NORMAL 10*3/UL
PMV BLD AUTO: 9.9 FL (ref 7.4–10.4)
POCT GLUCOSE: 226 MG/DL (ref 70–110)
POIKILOCYTOSIS BLD QL SMEAR: ABNORMAL
POTASSIUM SERPL-SCNC: 3.9 MMOL/L (ref 3.5–5.1)
PROT SERPL-MCNC: 7 GM/DL (ref 5.8–7.6)
PROT UR QL STRIP: ABNORMAL
PROTHROMBIN TIME: 13.9 SECONDS (ref 12.5–14.5)
RBC # BLD AUTO: 4.07 X10(6)/MCL (ref 4.7–6.1)
RBC #/AREA URNS AUTO: ABNORMAL /HPF
RBC MORPH BLD: ABNORMAL
SODIUM SERPL-SCNC: 141 MMOL/L (ref 136–145)
SP GR UR STRIP.AUTO: 1.04 (ref 1–1.03)
SPECIFIC GRAVITY, URINE AUTO (.000) (OHS): 1.04 (ref 1–1.03)
SPECIMEN OUTDATE: NORMAL
SQUAMOUS #/AREA URNS LPF: ABNORMAL /HPF
TROPONIN I SERPL-MCNC: 2.47 NG/ML (ref 0–0.04)
TROPONIN I SERPL-MCNC: 3.04 NG/ML (ref 0–0.04)
UNIT NUMBER: NORMAL
UROBILINOGEN UR STRIP-ACNC: NORMAL
WBC # BLD AUTO: 17.46 X10(3)/MCL (ref 4.5–11.5)
WBC #/AREA URNS AUTO: ABNORMAL /HPF

## 2024-11-30 PROCEDURE — 63600175 PHARM REV CODE 636 W HCPCS

## 2024-11-30 PROCEDURE — 99291 CRITICAL CARE FIRST HOUR: CPT

## 2024-11-30 PROCEDURE — 80307 DRUG TEST PRSMV CHEM ANLYZR: CPT | Performed by: EMERGENCY MEDICINE

## 2024-11-30 PROCEDURE — 63600175 PHARM REV CODE 636 W HCPCS: Performed by: EMERGENCY MEDICINE

## 2024-11-30 PROCEDURE — 93010 ELECTROCARDIOGRAM REPORT: CPT | Mod: ,,, | Performed by: INTERNAL MEDICINE

## 2024-11-30 PROCEDURE — 93005 ELECTROCARDIOGRAM TRACING: CPT

## 2024-11-30 PROCEDURE — 80053 COMPREHEN METABOLIC PANEL: CPT | Performed by: EMERGENCY MEDICINE

## 2024-11-30 PROCEDURE — 96375 TX/PRO/DX INJ NEW DRUG ADDON: CPT

## 2024-11-30 PROCEDURE — 96374 THER/PROPH/DIAG INJ IV PUSH: CPT | Mod: 59

## 2024-11-30 PROCEDURE — 36415 COLL VENOUS BLD VENIPUNCTURE: CPT | Performed by: EMERGENCY MEDICINE

## 2024-11-30 PROCEDURE — 20000000 HC ICU ROOM

## 2024-11-30 PROCEDURE — 84484 ASSAY OF TROPONIN QUANT: CPT | Performed by: EMERGENCY MEDICINE

## 2024-11-30 PROCEDURE — 30233R1 TRANSFUSION OF NONAUTOLOGOUS PLATELETS INTO PERIPHERAL VEIN, PERCUTANEOUS APPROACH: ICD-10-PCS | Performed by: SURGERY

## 2024-11-30 PROCEDURE — G0390 TRAUMA RESPONS W/HOSP CRITI: HCPCS

## 2024-11-30 PROCEDURE — 25000003 PHARM REV CODE 250

## 2024-11-30 PROCEDURE — 85610 PROTHROMBIN TIME: CPT | Performed by: EMERGENCY MEDICINE

## 2024-11-30 PROCEDURE — 86850 RBC ANTIBODY SCREEN: CPT | Performed by: EMERGENCY MEDICINE

## 2024-11-30 PROCEDURE — 99223 1ST HOSP IP/OBS HIGH 75: CPT | Mod: FS,,, | Performed by: PSYCHIATRY & NEUROLOGY

## 2024-11-30 PROCEDURE — 25000003 PHARM REV CODE 250: Performed by: SURGERY

## 2024-11-30 PROCEDURE — 83605 ASSAY OF LACTIC ACID: CPT

## 2024-11-30 PROCEDURE — 82962 GLUCOSE BLOOD TEST: CPT

## 2024-11-30 PROCEDURE — P9035 PLATELET PHERES LEUKOREDUCED: HCPCS | Performed by: EMERGENCY MEDICINE

## 2024-11-30 PROCEDURE — 25000003 PHARM REV CODE 250: Performed by: EMERGENCY MEDICINE

## 2024-11-30 PROCEDURE — 25500020 PHARM REV CODE 255: Performed by: EMERGENCY MEDICINE

## 2024-11-30 PROCEDURE — 83605 ASSAY OF LACTIC ACID: CPT | Performed by: EMERGENCY MEDICINE

## 2024-11-30 PROCEDURE — 85730 THROMBOPLASTIN TIME PARTIAL: CPT | Performed by: EMERGENCY MEDICINE

## 2024-11-30 PROCEDURE — 36430 TRANSFUSION BLD/BLD COMPNT: CPT

## 2024-11-30 PROCEDURE — 96365 THER/PROPH/DIAG IV INF INIT: CPT

## 2024-11-30 PROCEDURE — 85025 COMPLETE CBC W/AUTO DIFF WBC: CPT | Performed by: EMERGENCY MEDICINE

## 2024-11-30 PROCEDURE — 81001 URINALYSIS AUTO W/SCOPE: CPT | Performed by: EMERGENCY MEDICINE

## 2024-11-30 PROCEDURE — 82077 ASSAY SPEC XCP UR&BREATH IA: CPT | Performed by: EMERGENCY MEDICINE

## 2024-11-30 PROCEDURE — 87077 CULTURE AEROBIC IDENTIFY: CPT | Performed by: EMERGENCY MEDICINE

## 2024-11-30 RX ORDER — HYDRALAZINE HYDROCHLORIDE 20 MG/ML
10 INJECTION INTRAMUSCULAR; INTRAVENOUS EVERY 6 HOURS PRN
Status: DISCONTINUED | OUTPATIENT
Start: 2024-11-30 | End: 2024-12-05 | Stop reason: HOSPADM

## 2024-11-30 RX ORDER — SODIUM CHLORIDE 9 MG/ML
INJECTION, SOLUTION INTRAVENOUS CONTINUOUS
Status: DISCONTINUED | OUTPATIENT
Start: 2024-11-30 | End: 2024-12-03

## 2024-11-30 RX ORDER — METHOCARBAMOL 500 MG/1
500 TABLET, FILM COATED ORAL EVERY 8 HOURS
Status: DISCONTINUED | OUTPATIENT
Start: 2024-11-30 | End: 2024-12-03

## 2024-11-30 RX ORDER — POLYETHYLENE GLYCOL 3350 17 G/17G
17 POWDER, FOR SOLUTION ORAL 2 TIMES DAILY
Status: DISCONTINUED | OUTPATIENT
Start: 2024-11-30 | End: 2024-12-05 | Stop reason: HOSPADM

## 2024-11-30 RX ORDER — HYDROCODONE BITARTRATE AND ACETAMINOPHEN 500; 5 MG/1; MG/1
TABLET ORAL
Status: DISCONTINUED | OUTPATIENT
Start: 2024-11-30 | End: 2024-12-05

## 2024-11-30 RX ORDER — MUPIROCIN 20 MG/G
OINTMENT TOPICAL 2 TIMES DAILY
Status: COMPLETED | OUTPATIENT
Start: 2024-11-30 | End: 2024-12-05

## 2024-11-30 RX ORDER — LEVETIRACETAM 10 MG/ML
1000 INJECTION INTRAVASCULAR
Status: COMPLETED | OUTPATIENT
Start: 2024-11-30 | End: 2024-11-30

## 2024-11-30 RX ORDER — NICARDIPINE HYDROCHLORIDE 0.2 MG/ML
0-15 INJECTION INTRAVENOUS CONTINUOUS
Status: DISCONTINUED | OUTPATIENT
Start: 2024-11-30 | End: 2024-12-04

## 2024-11-30 RX ORDER — METOPROLOL TARTRATE 1 MG/ML
5 INJECTION, SOLUTION INTRAVENOUS
Status: COMPLETED | OUTPATIENT
Start: 2024-11-30 | End: 2024-11-30

## 2024-11-30 RX ORDER — BISACODYL 10 MG/1
10 SUPPOSITORY RECTAL DAILY PRN
Status: DISCONTINUED | OUTPATIENT
Start: 2024-11-30 | End: 2024-12-05 | Stop reason: HOSPADM

## 2024-11-30 RX ORDER — MORPHINE SULFATE 4 MG/ML
2 INJECTION, SOLUTION INTRAMUSCULAR; INTRAVENOUS
Status: DISPENSED | OUTPATIENT
Start: 2024-11-30 | End: 2024-12-03

## 2024-11-30 RX ORDER — OXYCODONE HYDROCHLORIDE 5 MG/1
5 TABLET ORAL EVERY 4 HOURS PRN
Status: DISCONTINUED | OUTPATIENT
Start: 2024-11-30 | End: 2024-12-05

## 2024-11-30 RX ORDER — LABETALOL HYDROCHLORIDE 5 MG/ML
10 INJECTION, SOLUTION INTRAVENOUS EVERY 6 HOURS PRN
Status: DISCONTINUED | OUTPATIENT
Start: 2024-11-30 | End: 2024-12-05 | Stop reason: HOSPADM

## 2024-11-30 RX ORDER — ACETAMINOPHEN 325 MG/1
650 TABLET ORAL EVERY 4 HOURS
Status: DISCONTINUED | OUTPATIENT
Start: 2024-11-30 | End: 2024-12-03

## 2024-11-30 RX ORDER — TALC
6 POWDER (GRAM) TOPICAL NIGHTLY PRN
Status: DISCONTINUED | OUTPATIENT
Start: 2024-11-30 | End: 2024-12-05 | Stop reason: HOSPADM

## 2024-11-30 RX ORDER — LEVETIRACETAM 500 MG/1
500 TABLET ORAL 2 TIMES DAILY
Status: DISCONTINUED | OUTPATIENT
Start: 2024-12-01 | End: 2024-12-01

## 2024-11-30 RX ORDER — DOCUSATE SODIUM 100 MG/1
100 CAPSULE, LIQUID FILLED ORAL 2 TIMES DAILY
Status: DISCONTINUED | OUTPATIENT
Start: 2024-11-30 | End: 2024-12-05 | Stop reason: HOSPADM

## 2024-11-30 RX ADMIN — SODIUM CHLORIDE: 9 INJECTION, SOLUTION INTRAVENOUS at 05:11

## 2024-11-30 RX ADMIN — LEVETIRACETAM INJECTION 1000 MG: 10 INJECTION INTRAVENOUS at 04:11

## 2024-11-30 RX ADMIN — METOPROLOL TARTRATE 5 MG: 1 INJECTION, SOLUTION INTRAVENOUS at 05:11

## 2024-11-30 RX ADMIN — HYDRALAZINE HYDROCHLORIDE 10 MG: 20 INJECTION INTRAMUSCULAR; INTRAVENOUS at 09:11

## 2024-11-30 RX ADMIN — IOHEXOL 60 ML: 350 INJECTION, SOLUTION INTRAVENOUS at 05:11

## 2024-11-30 RX ADMIN — MUPIROCIN: 20 OINTMENT TOPICAL at 09:11

## 2024-11-30 RX ADMIN — NICARDIPINE HYDROCHLORIDE 2.5 MG/HR: 0.2 INJECTION, SOLUTION INTRAVENOUS at 04:11

## 2024-11-30 RX ADMIN — MORPHINE SULFATE 2 MG: 4 INJECTION, SOLUTION INTRAMUSCULAR; INTRAVENOUS at 09:11

## 2024-11-30 RX ADMIN — SODIUM CHLORIDE 1000 ML: 9 INJECTION, SOLUTION INTRAVENOUS at 05:11

## 2024-11-30 NOTE — ED PROVIDER NOTES
Encounter Date: 11/30/2024    SCRIBE #1 NOTE: I, Robert Wang, am scribing for, and in the presence of,  Solange Montilla MD. I have scribed the entire note.       History   No chief complaint on file.    84 year old male with a hx of CKD, HTN, DM, and prostate cancer presents to the ED via EMS following a fall. EMS reports that the pt has a sensor that alerts EMS whenever the pt falls. Pt reportedly fell backwards from his wheel chair to the ground and EMS was notified. Pt is not speaking at this time. EMS reports the pt is not on a blood thinner. Pt takes ASA daily. Pt had a CBG of 211 PTA. Pt's ROS is unobtainable due to altered mental status.     The history is provided by the patient. No  was used.     Review of patient's allergies indicates:   Allergen Reactions    Ramipril      tinnitus    Rosiglitazone      Dyspnea      Rosuvastatin      Past Medical History:   Diagnosis Date    Atrial paroxysmal tachycardia     Chronic kidney disease, stage 3     Coronary arteriosclerosis     Diastolic dysfunction     HTN (hypertension)     Iron deficiency anemia, unspecified     Obstructive sleep apnea syndrome     Prostate cancer     Pure hypercholesterolemia     Type 2 diabetes mellitus without complications      Past Surgical History:   Procedure Laterality Date    CBP      CORONARY ARTERY BYPASS GRAFT (CABG)      x 3    Repair of finger laceration      resection of atypical mole       Family History   Problem Relation Name Age of Onset    Cancer Mother          cancer -- gastric and liver    Sudden death Mother      Cancer Father      Sudden death Father      Cancer Sister          breast    Breast cancer Sister      Coronary artery disease Paternal Grandfather       Social History     Tobacco Use    Smoking status: Never     Passive exposure: Never    Smokeless tobacco: Never   Substance Use Topics    Alcohol use: Never    Drug use: Never     Review of Systems   Unable to perform ROS: Mental status  change       Physical Exam     Initial Vitals   BP Pulse Resp Temp SpO2   11/30/24 1613 11/30/24 1609 11/30/24 1609 11/30/24 1611 11/30/24 1554   (!) 186/111 (!) 133 16 99.9 °F (37.7 °C) (!) 92 %      MAP       --                Physical Exam    Nursing note and vitals reviewed.  Constitutional: He appears well-developed and well-nourished. No distress.   HENT:   Head: Normocephalic and atraumatic. Mouth/Throat: Oropharynx is clear and moist.   Eyes: No scleral icterus.   Pupils 3 mm sluggish bilaterally.    Neck: Neck supple.   Normal range of motion.  Cardiovascular:  Regular rhythm.   Tachycardia present.         Pulmonary/Chest: Breath sounds normal. No respiratory distress.   Abdominal: Abdomen is soft. He exhibits no distension. There is no abdominal tenderness.   Musculoskeletal:      Cervical back: Normal range of motion and neck supple. No tenderness or bony tenderness.      Thoracic back: No tenderness or bony tenderness.      Lumbar back: No tenderness or bony tenderness.      Comments: 1+ bilateral lower extremity edema.      Neurological: GCS eye subscore is 4. GCS verbal subscore is 2. GCS motor subscore is 5.   5/5 strength to the right upper extremity. 3/5 strength to the left upper extremity, left lower extremity, and right lower extremity.    Skin: Skin is warm and dry.         ED Course   Critical Care    Date/Time: 11/30/2024 4:06 PM    Performed by: Solange Montilla MD  Authorized by: Solange Montilla MD  Direct patient critical care time: 29 minutes  Additional history critical care time: 8 minutes  Ordering / reviewing critical care time: 7 minutes  Documentation critical care time: 4 minutes  Consulting other physicians critical care time: 12 minutes  Consult with family critical care time: 10 minutes  Total critical care time (exclusive of procedural time) : 70 minutes  Critical care time was exclusive of separately billable procedures and treating other patients.  Critical care was  necessary to treat or prevent imminent or life-threatening deterioration of the following conditions: CNS failure or compromise, circulatory failure, cardiac failure and trauma.  Critical care was time spent personally by me on the following activities: development of treatment plan with patient or surrogate, discussions with consultants, interpretation of cardiac output measurements, evaluation of patient's response to treatment, examination of patient, obtaining history from patient or surrogate, ordering and performing treatments and interventions, ordering and review of laboratory studies, ordering and review of radiographic studies, pulse oximetry and re-evaluation of patient's condition.        Labs Reviewed   COMPREHENSIVE METABOLIC PANEL - Abnormal       Result Value    Sodium 141      Potassium 3.9      Chloride 106      CO2 18 (*)     Glucose 221 (*)     Blood Urea Nitrogen 34.5 (*)     Creatinine 2.23 (*)     Calcium 9.2      Protein Total 7.0      Albumin 3.5      Globulin 3.5      Albumin/Globulin Ratio 1.0 (*)     Bilirubin Total 0.9      ALP 91      ALT 28      AST 24      eGFR 28      Anion Gap 17.0      BUN/Creatinine Ratio 15     LACTIC ACID, PLASMA - Abnormal    Lactic Acid Level 5.5 (*)    CBC WITH DIFFERENTIAL - Abnormal    WBC 17.46 (*)     RBC 4.07 (*)     Hgb 12.3 (*)     Hct 39.0 (*)     MCV 95.8 (*)     MCH 30.2      MCHC 31.5 (*)     RDW 15.0      Platelet 188      MPV 9.9      NRBC% 0.0     TROPONIN I - Abnormal    Troponin-I 3.044 (*)    MANUAL DIFFERENTIAL - Abnormal    WBC 17.46      Neutrophils % 71      Lymphs % 19      Monocytes % 10      Eosinophils % 1      Neutrophils Abs 12.3966 (*)     Lymphs Abs 3.3174      Monocytes Abs 1.746 (*)     Eosinophils Abs 0.1746      Platelets Normal      RBC Morph Abnormal (*)     Poikilocytosis 1+ (*)     Anisocytosis 1+ (*)     Macrocytosis 1+ (*)     [place] Cells 1+ (*)    LACTIC ACID, PLASMA - Abnormal    Lactic Acid Level 2.9 (*)    POCT  GLUCOSE - Abnormal    POCT Glucose 226 (*)    PROTIME-INR - Normal    PT 13.9      INR 1.1     APTT - Normal    PTT 27.8     ALCOHOL,MEDICAL (ETHANOL) - Normal    Ethanol Level <10.0     CBC W/ AUTO DIFFERENTIAL    Narrative:     The following orders were created for panel order CBC auto differential.  Procedure                               Abnormality         Status                     ---------                               -----------         ------                     CBC with Differential[[id number]]       Abnormal            Final result               Manual Differential[[id number]]         Abnormal            Final result                 Please view results for these tests on the individual orders.   TYPE & SCREEN    Group & Rh O POS      Indirect [place] GEL NEG      Specimen Outdate 12/03/2024 23:59     ABORH RETYPE    ABORH Retype O POS     PREPARE PLATELETS (DOSE) SOFT    UNIT NUMBER [id number]      UNIT ABO/RH O POS      DISPENSE STATUS Transfused      Unit Expiration [id number]      Product Code [id number]      Unit Blood Type Code 5100      CROSSMATCH INTERPRETATION Not required       EKG Readings: (Independently Interpreted)   Initial Reading: No STEMI. Rhythm: Sinus Tachycardia. Heart Rate: 125. Clinical Impression: Sinus Tachycardia   11/30/2024 @ 1651  Baseline artifact     ECG Results              EKG 12-lead (Final result)        Collection Time Result Time QRS Duration OHS QTC Calculation    11/30/24 16:51:01 12/01/24 12:41:53 130 444                     Final result by Interface, Lab [facility] (12/01/24 12:42:00)                   Narrative:    Test Reason : R09.89,    Vent. Rate : 125 BPM     Atrial Rate : 125 BPM     P-R Int : 200 ms          QRS Dur : 130 ms      QT Int : 308 ms       P-R-T Axes : -29 -52 103 degrees    QTcB Int : 444 ms    Sinus tachycardia with occasional Premature ventricular complexes and  Fusion complexes  Left axis deviation  Nonspecific intraventricular  block  Inferior infarct Cannot rule out Anterior infarct ,age undetermined  T wave abnormality, consider lateral ischemia  Abnormal ECG  When compared with ECG of 22-Sep-2024 03:40,  Significant changes have occurred  Confirmed by Elia Hammer ([id number]) on 12/1/2024 12:41:52 PM    Referred By: DELMA SUNSHINE           Confirmed By: Elia Hammer                                  Imaging Results              CTA Head and Neck (xpd) (Final result)  Result time 12/01/24 09:47:12      Final result by Anita Otero MD (12/01/24 09:47:12)                   Impression:      1. Atherosclerotic changes at the carotid bulbs and proximal internal carotid arteries 50-60% stenosis bilaterally.  2. 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  3. Moderate narrowing at the right carotid terminus.  4. Moderate multifocal narrowing of the right vertebral artery V4 segment, with mild narrowing of the basilar and posterior cerebral arteries.  No major change from the [facility] interpretation.      Electronically signed by: Anita Otero  Date:    12/01/2024  Time:    09:47               Narrative:    EXAMINATION:  CTA HEAD AND NECK (XPD)    CLINICAL HISTORY:  Neuro deficit, acute, stroke suspected;    TECHNIQUE:  Axial images obtained through the cervical region and [place] of [place] before and after the administration of intravenous contrast.    Coronal, sagittal, MIP and 3D reconstructions were obtained from the axial data.    Automatic exposure control was utilized to limit radiation dose.    Radiation Dose:    Total DLP: 2511 mGy*cm    COMPARISON:  CT head and cervical spine dated 11/30/2024    FINDINGS:  Head CT with contrast:    Unchanged subdural, parenchymal, subarachnoid and intraventricular hemorrhages..    No enhancing abnormalities.    If present, stenosis of the carotid bulbs is measured  based on NASCET criteria,    i.e. area of maximal stenosis compared to the cervical ICA distal to the bulb.    Cervical CTA:    There are extensive atherosclerotic calcifications along the aortic arch and at the origins of the great vessels, with mild narrowing of the origins of the left common carotid artery and subclavian artery.    The common carotid arteries are patent with mild narrowing on the left.  There are atherosclerotic calcifications at the carotid bulbs and proximal internal carotid arteries with 50-60% stenosis bilaterally.  The internal carotid arteries are patent with mild scattered calcifications on the right.    The right vertebral artery is patent with moderate narrowing at its origin.  There is non opacification at the origin of the left vertebral artery, with partial reconstitution of flow in the V2 and V3 segments with moderate multifocal stenosis.    Intracranial CTA:    There are calcifications along the course the carotid siphons with mild narrowing bilaterally.  There is moderate narrowing at the right carotid terminus.  The middle cerebral arteries and anterior cerebral arteries appear patent.    There is minimal opacification in the proximal V4 vertebral body, with otherwise non opacification.  There is moderate multifocal narrowing of the right vertebral artery.  The basilar artery is patent although small in caliber with mild narrowing.  The bilateral posterior cerebral arteries are patent with mild narrowing bilaterally.    The neural venous sinuses are patent.                        Preliminary result by Derik Allred MD (11/30/24 17:49:16)                   Impression:    1. Moderate atheromatous calcification of the cavernous to supraclinoid segments of thesegments of the internal carotid artery is seen with associated mild to moderate stenosis.  2. Mild to moderate atheromatous changes of the M1 segment of the left middle cerebral artery is seen with associated mild stenosis  (series 19, image 137).  3. Moderate to severe atheromatous change of the right left bilateral vertebral arteries is seen with associated moderate right greater than left stenosis (series 19, image 150).  4. Moderate atheromatous calcification with mild stenosis is seen at the origin of the left common carotid artery.  5. Moderate atheromatous calcification of the right common carotid artery bulb is seen with associated moderate stenosis (series 19, image 289).  6. Moderate atheromatous calcification of the left common carotid artery bulb is seen with associated moderate stenosis (series 19, image 289).  7. 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  8. Minimal atheromatous calcification noted.  9. There is no aneursym or dissection identified.               Narrative:    START OF REPORT:  Technique: CT angiogram of the intracranial and neck vessels was performed with intravenous contrast with direct axial as well as sagittal and coronal reformations.    Comparison: Comparison is with study dated 2024-09-22 04:20:25.    Clinical history: Trauma 2/ Stroke- fell out of walker and hit head and is extremely altered and aggressive.    Findings:  Intracranial Vascular structures:  Internal carotid arteries: Moderate atheromatous calcification of the cavernous to supraclinoid segments of thesegments of the internal carotid artery is seen with associated mild to moderate stenosis.  Middle cerebral arteries: Mild to moderate atheromatous changes of the M1 segment of the left middle cerebral artery is seen with associated mild stenosis (series 19, image 137).  Anterior cerebral arteries: Unremarkable.  Vertebral arteries: Moderate to severe atheromatous change of the right left bilateral vertebral arteries is seen with associated moderate right greater than left stenosis (series 19, image 150).  Basilar artery: Minimal atheromatous calcification noted.  Posterior cerebral  arteries: Unremarkable.  Posterior communicating arteries: Unremarkable.  Neck Vascular structures: Moderate atheromatous calcification with mild stenosis is seen at the origin of the left common carotid artery. Mild atheromatous calcifications with no associated stenosis at the origin of the right brachiocephalic, right common carotid and both subclavian arteries.  Common carotid arteries:  Right common carotid artery: Moderate atheromatous calcification of the right common carotid artery bulb is seen with associated moderate stenosis (series 19, image 289).  Left common carotid artery: Moderate atheromatous calcification of the left common carotid artery bulb is seen with associated moderate stenosis (series 19, image 289).  Vertebral arteries: There is severe occlusion of the foraminal segment of the  segment of the left vertebral artery.                                         CT HEAD FOR STROKE (Final result)  Result time 12/01/24 09:02:13   Procedure changed from CT Head Without Contrast     Final result by Anita Otero MD (12/01/24 09:02:13)                   Impression:      1. Bilateral subdural, subarachnoid and intraventricular hemorrhages.  2. Parenchymal hemorrhages in the bilateral frontal and temporal lobes with surrounding edema.  3. Mass effect with 1-2 mm rightward midline shift.  No major change from the [facility] interpretation.      Electronically signed by: Anita Otero  Date:    12/01/2024  Time:    09:02               Narrative:    EXAMINATION:  CT HEAD FOR STROKE    CLINICAL HISTORY:  Trauma;    TECHNIQUE:  Axial scans were obtained from skull base to the vertex.    Coronal and sagittal reconstructions obtained from the axial data.    Automatic exposure control was utilized to limit radiation dose.    Contrast: None    Radiation Dose:    Total DLP: 1062 mGy*cm    COMPARISON:  CT head dated 09/22/2024    FINDINGS:  There are mixed density bilateral cerebral convexity subdural  hemorrhages measuring up to 1.4 cm in thickness.  There is small parenchymal hemorrhages in the bilateral frontal and temporal lobes with surrounding edema.  There is a small volume of scattered bilateral subarachnoid hemorrhage.  Trace interventricular hemorrhage is seen layering in the lateral ventricles.  There is mass effect with sulcal effacement and 1-2 mm of rightward midline shift.  The basal cisterns are patent.  There is a nondisplaced occipital bone fracture.                        Preliminary result by Derik Allred MD (11/30/24 17:38:25)                   Impression:    1. Crescentic mixed predominantly hyperdense extra-axial collections are seen overlying both cerebral hemispheres with a maximum thickness of 1.1 cm in the right and 5.0 mm in the left. There is also hyperdense thickening of the anterior cerebral falx. These are consistent with acute subdural hematomas. Hyperdense collections are seen insinuating in the sulci of both frontal and right parietal lobes (image 37, series 2). This is compatible with subarachnoid hemorrhage. Correlate with clinical findings as regards additional evaluation and follow-up.  2. There is a linear non-displaced fracture in the occipital bone propagating inferiorly towards the region of the foramen magnum seen on images 10-26, series 4.  3. Details and other findings as noted above.               Narrative:    START OF REPORT:  Technique: CT of the head was performed without intravenous contrast with axial as well as coronal and sagittal images.    Comparison: Comparison is with study dated 2024-09-22 04:20:25.    Dosage Information: Automated Exposure Control was utilized 1061.63 mGy.cm.    Clinical history: Trauma 2/ Stroke- fell out of walker and hit head and is extremely altered and aggressive.    Findings:  Hemorrhage: Crescentic mixed predominantly hyperdense extra-axial collections are seen overlying both cerebral hemispheres with a maximum thickness of 1.1  cm in the right and 5.0 mm in the left. There is also hyperdense thickening of the anterior cerebral falx. These are consistent with acute subdural hematomas. Hyperdense collections are seen insinuating in the sulci of both frontal and right parietal lobes (image 37, series 2). This is compatible with subarachnoid hemorrhage.  CSF spaces: The ventricles, sulci and basal cisterns all appear prominent consistent with global cerebral atrophy.  Brain parenchyma: There is preservation of the grey white junction throughout. No acute infarct is identified. Moderate microvascular change is seen in portions of the periventricular and deep white matter tracts.  Cerebellum: Unremarkable.  Sella and skull base: The sella appears to be within normal limits for age.  Intracranial calcifications: Incidental note is made of bilateral choroid plexus calcification. Incidental note is made of some pineal region calcification. Incidental note is made of some calcification of the falx.  Calvarium: There is a linear non-displaced fracture in the occipital bone propagating inferiorly towards the region of the foramen magnum seen on images 10-26, series 4.    Maxillofacial Structures:  Paranasal sinuses: Opacities are seen in both ethmoid and right maxillary sinuses. Mucoperiosteal thickening is seen in the left maxillary and both sphenoid sinuses. This may reflect polysinusitis with acute and chronic components.  Orbits: The orbits appear unremarkable.  Zygomatic arches: The zygomatic arches are intact and unremarkable.  Temporal bones and mastoids: The temporal bones and mastoids appear unremarkable.  TMJ: The mandibular condyles appear normally placed with respect to the mandibular fossa.    Notifications: The results were discussed with the emergency room physician prior to dictation at 2024-11-30 17:38:12 CST.                                         CT Cervical Spine Without Contrast (Final result)  Result time 12/01/24 08:52:44       Final result by Anita Otero MD (12/01/24 08:52:44)                   Impression:      Evaluation limited by motion artifact.  No acute fracture identified.    No significant change from the [facility] interpretation      Electronically signed by: Anita Otero  Date:    12/01/2024  Time:    08:52               Narrative:    EXAMINATION:  CT CERVICAL SPINE WITHOUT CONTRAST    CLINICAL HISTORY:  Trauma;    TECHNIQUE:  Noncontrast CT images of the cervical spine. Axial, coronal, and sagittal reformatted images were obtained. Dose length product is 781 mGycm. Automatic exposure control, adjustment of mA/kV or iterative reconstruction technique was used to limit radiation dose.    COMPARISON:  CT cervical spine dated 09/22/2024    FINDINGS:  The cervical spine is visualized through the level of C7-T1.    Evaluation is limited by motion artifact.  There is no acute fracture identified.  There are multilevel degenerative changes with disc height loss, marginal osteophyte formation and facet arthropathy.  There is no paraspinal hematoma.                        Preliminary result by Derik Allred MD (11/30/24 17:28:55)                   Impression:    1. No acute cervical spine fracture dislocation or subluxation is seen.  2. Moderate opacity is seen in the left mastoid air cells. This may represent hemosinus.  3. Partially visualized is the linear fracture involving the mildine and left occipital bone extending to the foramen magnum and left mastoid bone.  4. Degenerative changes and other details as above.               Narrative:    START OF REPORT:  Technique: CT of the cervical spine was performed without intravenous contrast with axial as well as sagittal and coronal images.    Comparison: None.    Dosage Information: Automated exposure control was utilized.    Clinical history: Trauma 2/ Stroke- fell out of walker and hit head and is extremely altered and aggressive.    Findings:  Spine:  Mineralization:  Osteopenia is seen in the visualized bony structures of the cervical spine. There is a well-circumscribed ovoid lytic focus with sclerotic border involving the left side of the dens. This may represent a bone cyst.  Rotation: No significant rotation is seen.  Scoliosis: No significant scoliosis is seen.  Vertebral Fusion: No vertebral fusion is identified.  Listhesis: No significant listhesis is identified.  Lordosis: Moderate straightening of the cervical lordosis is seen.  Intervertebral disc spaces: Multilevel loss of disc height is seen.  Osteophytes: Mild multilevel endplate osteophytes are seen.  Endplate Sclerosis: Multilevel endplate sclerosis is seen.  Uncovertebral degenerative changes: Mild multilevel uncovertebral joint arthrosis is seen.  Facet degenerative changes: Moderate multilevel facet degenerative changes are seen.  Fractures: No acute cervical spine fracture dislocation or subluxation is seen.    Miscellaneous: Partially visualized is the linear fracture involving the mildine and left occipital bone extending to the foramen magnum and left mastoid bone. Details of this findings will be discussed in a separate head CT report.  Mastoid air cells: Moderate opacity is seen in the left mastoid air cells. This may represent hemosinus.                                         X-Ray Pelvis Routine AP (Final result)  Result time 11/30/24 16:28:53      Final result by Michael Sweet MD (11/30/24 16:28:53)                   Impression:      No acute osseous process appreciated.      Electronically signed by: Michael Sweet  Date:    11/30/2024  Time:    16:28               Narrative:    EXAMINATION:  XR PELVIS ROUTINE AP    CLINICAL HISTORY:  r/o bleeding or hemorrhage;    TECHNIQUE:  AP view of the pelvis.    COMPARISON:  None    FINDINGS:  Mildly limited evaluation due to underpenetration.  No defined acute fracture.  Hips and symphysis grossly aligned.                                       X-Ray Chest  1 View (Final result)  Result time 11/30/24 16:27:02      Final result by Michael Sweet MD (11/30/24 16:27:02)                   Impression:      Mild perihilar vascular prominence.      Electronically signed by: Michael Sweet  Date:    11/30/2024  Time:    16:27               Narrative:    EXAMINATION:  XR CHEST 1 VIEW    CLINICAL HISTORY:  r/o bleeding or hemorrhage;    TECHNIQUE:  Frontal view(s) of the chest.    COMPARISON:  Radiography 08/12/2016    FINDINGS:  Previous sternotomy.  Cardiac silhouette upper normal in size.  The lungs are well-inflated.  Mild perihilar vascular prominence.  No defined consolidation.  No significant pleural effusion or discernible pneumothorax.                                       Medications   niCARdipine 40 mg/200 mL (0.2 mg/mL) infusion (2.5 mg/hr Intravenous New Bag 11/30/24 9114)   levETIRAcetam in NaCl (iso-os) IVPB 1,000 mg (1,000 mg Intravenous New Bag 11/30/24 9121)   sodium chloride 0.9% bolus 1,000 mL 1,000 mL (has no administration in time range)   metoprolol injection 5 mg (has no administration in time range)     Medical Decision Making  Problems Addressed:  Acute kidney injury superimposed on chronic kidney disease: acute illness or injury  DNR (do not resuscitate): acute illness or injury  NSTEMI (non-ST elevated myocardial infarction): acute illness or injury that poses a threat to life or bodily functions  Suspected cerebrovascular accident (CVA): acute illness or injury  Traumatic subdural hematoma without loss of consciousness, initial encounter: acute illness or injury that poses a threat to life or bodily functions    Amount and/or Complexity of Data Reviewed  Labs: ordered.  Radiology: ordered.    Risk  Prescription drug management.  Decision regarding hospitalization.      ED assessment:    Mr. Moran presented as trauma activation after unwitnessed fall from wheelchair w/ change in mentation from baseline, GCS 11-12 on ED arrival. Activated  simultaneously as CODE STROKE with concern for CVA leading to fall given no outward evidence of trauma at this time.     Differential diagnosis (including but not limited to):   Closed head injury, intracranial hemorrhage, cerebral contusion, concussion, skull fx, spinal fx, hemorrhagic CVA, ischemic CVA, NOE, UTI, electrolyte derangements.     ED management:   Unfortunately, CT demonstrates large SDH, bifrontal parenchymal contusions. Extensive discussion w/ myself, Dr. Sims, and the patient's daughter at bedside and son via phone call (driving from 2.5 hours away). They have decided, given Mr. Moran's baseline health and previously stated wishes to forgo invasive management of diagnoses in the past, that proceeding with surgical intervention would not be aligned with his goals of care. Furthermore, patient's troponin elevated c/w NSTEMI. Unable to anticoagulate given ICH. Cardiology will follow; however, discussed with family multiple life threatening conditions present at this time with treatment for one likely to worsen the other. They have agreed to DNR status. In the meantime, will attempt to medically optimize with BP control, seizure prevention, antiplatelet reversal to assist with decreasing hematoma expansion (though may worsen ACS).   Case discussed with trauma service who accepts for admission. Neurosurgery and cardiology to follow in consultation.     My independent radiology interpretation:   CXR: no displaced rib fracture, no pneumothorax, prominent pulmonary vasculature  CT head: moderate right and small left and parafalcine SDH, bifrontal parenchymal contusions    Amount and/or Complexity of Data Reviewed  Independent historian: EMS   Summary of history: entirety of hx provided by EMS as patient nonverbal at time of arrival  External data reviewed: notes from previous ED visits and prescription medications   Summary of data reviewed: no thinners, + aspirin, last ED visit 9/22/24 w/ fall at that  time as well. Hx carotid stenosis, followed by Dr. Gaston    Risk and benefits of testing: discussed   Labs: ordered and reviewed  Radiology: ordered and independent interpretation performed (see above or ED course)  ECG/medicine tests: ordered and independent interpretation performed (see above or ED course)  Discussion of management or test interpretation with external provider(s): discussed with trauma surgery, neurosurgery, cardiology consultant and discussed with radiologist   Summary of discussion: as above    Risk  Prescription drug management   Drug therapy requiring intense monitoring for toxicity   Decision regarding hospitalization  Decision not to resuscitate or to de-escalate care due to poor prognosis   Shared decision making     Critical Care  30-74 minutes     Solange PHILLIP MD personally performed the history, PE, MDM, and procedures as documented above and agree with the scribe's documentation.           Scribe Attestation:   Scribe #1: I performed the above scribed service and the documentation accurately describes the services I performed. I attest to the accuracy of the note.    Attending Attestation:           Physician Attestation for Scribe:  Physician Attestation Statement for Scribe #1: Eladia PHILLIP Kayla O, MD, reviewed documentation, as scribed by Robert Wagn in my presence, and it is both accurate and complete.             ED Course as of 12/01/24 1420   Sat Nov 30, 2024   1623 Activated as a trauma given reported fall with mentation changes; however, no external evidence of injury at this time.  He was hypotensive, acutely nonverbal, predominantly utilizing the right upper extremity with very weak movement of the left upper extremity.  Simultaneous activation as a CODE FAST w/ concern for CVA.   No anticoagulants in EMR review [KS]   1705 Discussed with cardiology on call, RT Seals NP - informed will be holding antiplatelet/anticoagulation in light of large SDH, recommends trend cardiac  enzymes, echocardiogram inpatient. Cardiology will follow in consultation.  [KS]      ED Course User Index  [KS] Solange Montilla MD                           Clinical Impression:  Final diagnoses:  [R09.89] Suspected cerebrovascular accident (CVA)  [S06.5X0A] Traumatic subdural hematoma without loss of consciousness, initial encounter (Primary)  [I21.4] NSTEMI (non-ST elevated myocardial infarction)  [N17.9, N18.9] Acute kidney injury superimposed on chronic kidney disease  [Z66] DNR (do not resuscitate)          ED Disposition Condition    Admit Serious                Solange Montilla MD  12/01/24 6885

## 2024-11-30 NOTE — H&P
"   Trauma Surgery   Activation Note    Patient Name: Damon Moran  MRN: [id number]   YOB: 1940  Date: 11/30/2024    LEVEL 2 TRAUMA     Subjective:   History of present illness: Patient is an approximately 84 year old male who presents as L2 truama after fall from wheel chair. Per report, patient fell backwards in a wheel chair. EMS notified by sensor worn by patient. EMS reports patient taking 81 mg ASA daily, denies any use of blood thinners at home.  Code stroke called from trauma bay.  I was present on patient arrival to trauma bay.     History limited due to AMS.        Primary Survey:  A patent   B Bilateral breath wounds   C 2+ distal pulses   D GCS 11(E 4, V 1, M 5)    E exposed, log-rolled and examined (see below)   F See below     VITAL SIGNS: 24 HR MIN & MAX LAST   Temp  Min: 99.9 °F (37.7 °C)  Max: 99.9 °F (37.7 °C)  99.9 °F (37.7 °C)   BP  Min: 134/95  Max: 186/111  135/77    Pulse  Min: 117  Max: 133  (!) 127    Resp  Min: 14  Max: 23  (!) 22    SpO2  Min: 92 %  Max: 100 %  96 %      HT: 5' 8" (172.7 cm)  WT: 95.3 kg (210 lb)  BMI: 31.9     FAST: deferred    Medications/transfusions received en-route:   Medications/transfusions received in trauma bay:     Scheduled Meds:    acetaminophen  650 mg Oral Q4H    docusate sodium  100 mg Oral BID    levETIRAcetam (Keppra) IV (PEDS and ADULTS)  1,000 mg Intravenous ED 1 Time    [START ON 12/1/2024] levETIRAcetam  500 mg Oral BID    methocarbamoL  500 mg Oral Q8H    polyethylene glycol  17 g Oral BID    sodium chloride 0.9%  1,000 mL Intravenous ED 1 Time      Continuous Infusions:    0.9% NaCl   Intravenous Continuous        nicardipine  0-15 mg/hr Intravenous Continuous 12.5 mL/hr at 11/30/24 1655 2.5 mg/hr at 11/30/24 1655      PRN Meds:   Current Facility-Administered Medications:     0.9%  NaCl infusion (for blood administration), , Intravenous, Q24H PRN    bisacodyL, 10 mg, Rectal, Daily PRN    melatonin, 6 mg, Oral, Nightly PRN    morphine, " 2 mg, Intravenous, Q2H PRN    oxyCODONE, 5 mg, Oral, Q4H PRN     ROS: unable to assess secondary to AMS     Allergies: Unknown  PMH: Unknown  PSH: Unknown  Social history: Unknown  Objective:   Secondary Survey:   General: Well developed, well nourished, no acute distress, AAOx3  Neuro: CNII-XII grossly intact  HEENT:  Normocephalic, atraumatic, pupils 3mm and sluggishly reactive,   CV:  tachycardic  Pulse: 2+ RP b/l, 2+ DP b/l   Resp/chest:  Non-labored breathing, satting on room air  GI:  Abdomen soft, non-tender, non-distended  :  deferred.   Rectal: deferred.  Extremities: Moves RUE spontaneously, no obvious gross deformities.  Back/Spine: No bony TTP, no palpable step offs or deformities.  Cervical back: Normal. No tenderness.  Thoracic back: Normal. No tenderness.  Lumbar back: Normal. No tenderness.  Skin/wounds:  Warm, well perfused.  Psych: Normal mood and affect.    Labs:  Recent Labs     11/30/24  1617 11/30/24  1618   WBC 17.46*  --    HGB 12.3*  --    HCT 39.0*  --      --    INR  --  1.1     Recent Labs     11/30/24  1617      K 3.9      CO2 18*   BUN 34.5*   CREATININE 2.23*   CALCIUM 9.2   ALBUMIN 3.5   BILITOT 0.9   AST 24   ALKPHOS 91   ALT 28     Recent Labs     11/30/24  1612   POCTGLUCOSE 226*          Imaging:  Imaging Results              CTA Head and Neck (xpd) (In process)                      CT HEAD FOR STROKE (In process)    Procedure changed from CT Head Without Contrast                    CT Cervical Spine Without Contrast (In process)                      X-Ray Pelvis Routine AP (Final result)  Result time 11/30/24 16:28:53      Final result by Michael Sweet MD (11/30/24 16:28:53)                   Impression:      No acute osseous process appreciated.      Electronically signed by: Michael Sweet  Date:    11/30/2024  Time:    16:28               Narrative:    EXAMINATION:  XR PELVIS ROUTINE AP    CLINICAL HISTORY:  r/o bleeding or hemorrhage;    TECHNIQUE:  AP  view of the pelvis.    COMPARISON:  None    FINDINGS:  Mildly limited evaluation due to underpenetration.  No defined acute fracture.  Hips and symphysis grossly aligned.                                       X-Ray Chest 1 View (Final result)  Result time 11/30/24 16:27:02      Final result by Michael Sweet MD (11/30/24 16:27:02)                   Impression:      Mild perihilar vascular prominence.      Electronically signed by: Michael Sweet  Date:    11/30/2024  Time:    16:27               Narrative:    EXAMINATION:  XR CHEST 1 VIEW    CLINICAL HISTORY:  r/o bleeding or hemorrhage;    TECHNIQUE:  Frontal view(s) of the chest.    COMPARISON:  Radiography 08/12/2016    FINDINGS:  Previous sternotomy.  Cardiac silhouette upper normal in size.  The lungs are well-inflated.  Mild perihilar vascular prominence.  No defined consolidation.  No significant pleural effusion or discernible pneumothorax.                                        Assessment & Plan:   Patient is an approximately 84 year old male who presents as L2 truama after fall from wheel chair.     SDH  - NSGY consulted, appreciate recs  - Recommend admission to TICU   - Q1H neuro checks   - Repeat CTH at 10 PM, or sooner if mental status declines  - SBP goal < 140   - Cardene drip  - Keppra x7 days     Elevated troponin  - Cardiology consulted, appreciate recs    Elevated lactate  - Bolus 1L NS, followed by mIVF at 100 cc/hr  - Q4H lactate, trend until clears    Hypertension  - Cardene drip for SBP <140 goals    - Admit to TICU  - NPO for now  - MMPC  - Bowel regimen  - SCDs for VTE ppx   - Hold Lovenox given injury  - Follow up cardiology recommendations  - Follow up repeat CTH @ 10PM      Jered Benitez MD  General Surgery PGY-1  [facility]

## 2024-11-30 NOTE — CONSULTS
"   Trauma Surgery   Activation Note    Patient Name: Damon Moran  MRN: [id number]   YOB: 1940  Date: 11/30/2024    LEVEL 2 TRAUMA     Subjective:   History of present illness: Patient is an approximately 84 year old male who presents as L2 truama after fall from wheel chair. Per report, patient fell backwards in a wheel chair. EMS notified by sensor worn by patient. EMS reports patient taking 81 mg ASA daily, denies any use of blood thinners at home.  Code stroke called from trauma bay.  I was present on patient arrival to trauma bay.     History limited due to AMS.        Primary Survey:  A patent   B Bilateral breath wounds   C 2+ distal pulses   D GCS 11(E 4, V 1, M 5)    E exposed, log-rolled and examined (see below)   F See below     VITAL SIGNS: 24 HR MIN & MAX LAST   Temp  Min: 99.9 °F (37.7 °C)  Max: 99.9 °F (37.7 °C)  99.9 °F (37.7 °C)   BP  Min: 134/95  Max: 186/111  135/77    Pulse  Min: 117  Max: 133  (!) 127    Resp  Min: 14  Max: 23  (!) 22    SpO2  Min: 92 %  Max: 100 %  96 %      HT: 5' 8" (172.7 cm)  WT: 95.3 kg (210 lb)  BMI: 31.9     FAST: deferred    Medications/transfusions received en-route:   Medications/transfusions received in trauma bay:     Scheduled Meds:    acetaminophen  650 mg Oral Q4H    docusate sodium  100 mg Oral BID    levETIRAcetam (Keppra) IV (PEDS and ADULTS)  1,000 mg Intravenous ED 1 Time    [START ON 12/1/2024] levETIRAcetam  500 mg Oral BID    methocarbamoL  500 mg Oral Q8H    polyethylene glycol  17 g Oral BID    sodium chloride 0.9%  1,000 mL Intravenous ED 1 Time      Continuous Infusions:    0.9% NaCl   Intravenous Continuous        nicardipine  0-15 mg/hr Intravenous Continuous 12.5 mL/hr at 11/30/24 1655 2.5 mg/hr at 11/30/24 1655      PRN Meds:   Current Facility-Administered Medications:     0.9%  NaCl infusion (for blood administration), , Intravenous, Q24H PRN    bisacodyL, 10 mg, Rectal, Daily PRN    melatonin, 6 mg, Oral, Nightly PRN    morphine, " 2 mg, Intravenous, Q2H PRN    oxyCODONE, 5 mg, Oral, Q4H PRN     ROS: unable to assess secondary to AMS     Allergies: Unknown  PMH: Unknown  PSH: Unknown  Social history: Unknown  Objective:   Secondary Survey:   General: Well developed, well nourished, no acute distress, AAOx3  Neuro: CNII-XII grossly intact  HEENT:  Normocephalic, atraumatic, pupils 3mm and sluggishly reactive,   CV:  tachycardic  Pulse: 2+ RP b/l, 2+ DP b/l   Resp/chest:  Non-labored breathing, satting on room air  GI:  Abdomen soft, non-tender, non-distended  :  deferred.   Rectal: deferred.  Extremities: Moves RUE spontaneously, no obvious gross deformities.  Back/Spine: No bony TTP, no palpable step offs or deformities.  Cervical back: Normal. No tenderness.  Thoracic back: Normal. No tenderness.  Lumbar back: Normal. No tenderness.  Skin/wounds:  Warm, well perfused.  Psych: Normal mood and affect.    Labs:  Recent Labs     11/30/24  1617 11/30/24  1618   WBC 17.46*  --    HGB 12.3*  --    HCT 39.0*  --      --    INR  --  1.1     Recent Labs     11/30/24  1617      K 3.9      CO2 18*   BUN 34.5*   CREATININE 2.23*   CALCIUM 9.2   ALBUMIN 3.5   BILITOT 0.9   AST 24   ALKPHOS 91   ALT 28     Recent Labs     11/30/24  1612   POCTGLUCOSE 226*          Imaging:  Imaging Results              CTA Head and Neck (xpd) (In process)                      CT HEAD FOR STROKE (In process)    Procedure changed from CT Head Without Contrast                    CT Cervical Spine Without Contrast (In process)                      X-Ray Pelvis Routine AP (Final result)  Result time 11/30/24 16:28:53      Final result by Michael Sweet MD (11/30/24 16:28:53)                   Impression:      No acute osseous process appreciated.      Electronically signed by: Michael Sewet  Date:    11/30/2024  Time:    16:28               Narrative:    EXAMINATION:  XR PELVIS ROUTINE AP    CLINICAL HISTORY:  r/o bleeding or hemorrhage;    TECHNIQUE:  AP  view of the pelvis.    COMPARISON:  None    FINDINGS:  Mildly limited evaluation due to underpenetration.  No defined acute fracture.  Hips and symphysis grossly aligned.                                       X-Ray Chest 1 View (Final result)  Result time 11/30/24 16:27:02      Final result by Michael Sweet MD (11/30/24 16:27:02)                   Impression:      Mild perihilar vascular prominence.      Electronically signed by: Michael Sweet  Date:    11/30/2024  Time:    16:27               Narrative:    EXAMINATION:  XR CHEST 1 VIEW    CLINICAL HISTORY:  r/o bleeding or hemorrhage;    TECHNIQUE:  Frontal view(s) of the chest.    COMPARISON:  Radiography 08/12/2016    FINDINGS:  Previous sternotomy.  Cardiac silhouette upper normal in size.  The lungs are well-inflated.  Mild perihilar vascular prominence.  No defined consolidation.  No significant pleural effusion or discernible pneumothorax.                                        Assessment & Plan:   Patient is an approximately 84 year old male who presents as L2 truama after fall from wheel chair.     SDH  - NSGY consulted, appreciate recs  - Recommend admission to TICU   - Q1H neuro checks   - Repeat CTH at 10 PM, or sooner if mental status declines  - SBP goal < 140   - Cardene drip  - Keppra x7 days     Elevated troponin  - Cardiology consulted, appreciate recs    Elevated lactate  - Bolus 1L NS, followed by mIVF at 100 cc/hr  - Q4H lactate, trend until clears    Hypertension  - Cardene drip for SBP <140 goals    - Admit to TICU  - NPO for now  - MMPC  - Bowel regimen  - SCDs for VTE ppx   - Hold Lovenox given injury  - Follow up cardiology recommendations  - Follow up repeat CTH @ 10PM    Jered Benitez MD  General Surgery PGY-1  [facility]

## 2024-11-30 NOTE — CONSULTS
[facility] - Emergency Dept  Neurology  Consult Note      Damon Moran is a 84 y.o. male who presented to [facility] on 11/30/2024 with reports of  fall off the back of rolling walker. LKN unknown. EMS responded to stroke alert received at 3:21pm. Per EMS patient attempted to speak, unclear. Moving all extremities spontaneous, not following commands . Stroke risk factors include diabetes mellitus, hypertension, and CAD, prior stroke . Prior stroke history: yes, exact type of CVA unknown.       Past Medical History:   Diagnosis Date    Atrial paroxysmal tachycardia     Chronic kidney disease, stage 3     Coronary arteriosclerosis     Diastolic dysfunction     HTN (hypertension)     Iron deficiency anemia, unspecified     Obstructive sleep apnea syndrome     Prostate cancer     Pure hypercholesterolemia     Type 2 diabetes mellitus without complications      Past Surgical History:   Procedure Laterality Date    CBP      CORONARY ARTERY BYPASS GRAFT (CABG)      x 3    Repair of finger laceration      resection of atypical mole       Family History   Problem Relation Name Age of Onset    Cancer Mother          cancer -- gastric and liver    Sudden death Mother      Cancer Father      Sudden death Father      Cancer Sister          breast    Breast cancer Sister      Coronary artery disease Paternal Grandfather       Social History     Tobacco Use    Smoking status: Never     Passive exposure: Never    Smokeless tobacco: Never   Substance Use Topics    Alcohol use: Never    Drug use: Never      Review of patient's allergies indicates:   Allergen Reactions    Ramipril      tinnitus    Rosiglitazone      Dyspnea      Rosuvastatin          STROKE DOCUMENTATION   Acute Stroke Times   Last Known Normal Date: 11/30/24  Unknown Normal Time: Unknown Time  Symptom Onset Date: 11/30/24  Unknown Symptom Onset Time: Unknown Time  Stroke Team Called Date: 11/30/24  Stroke Team Called Time: 1610  Stroke Team Arrival Date:  11/30/24  Stroke Team Arrival Time: 1612    NIH Scale:  1a. Level of Consciousness: 0-->Alert, keenly responsive  1b. LOC Questions: 2-->Answers neither question correctly  1c. LOC Commands: 2-->Performs neither task correctly  2. Best Gaze: 0-->Normal  3. Visual: 0-->No visual loss  4. Facial Palsy: 0-->Normal symmetrical movements  5a. Motor Arm, Left: 3-->No effort against gravity, limb falls  5b. Motor Arm, Right: 3-->No effort against gravity, limb falls  6a. Motor Leg, Left: 3-->No effort against gravity, leg falls to bed immediately  6b. Motor Leg, Right: 3-->No effort against gravity, leg falls to bed immediately  7. Limb Ataxia: 1-->Present in one limb  8. Sensory: 1-->Mild-to-moderate sensory loss, patient feels pinprick is less sharp or is dull on the affected side, or there is a loss of superficial pain with pinprick, but patient is aware of being touched  9. Best Language: 3-->Mute, global aphasia, no usable speech or auditory comprehension  10. Dysarthria: 0-->Normal  11. Extinction and Inattention (formerly Neglect): 1-->Visual, tactile, auditory, spatial, or personal inattention or extinction to bilateral simultaneous stimulation in one of the sensory modalities  Total (NIH Stroke Scale): 22     Modified Harman Score: 4  [place] Coma Scale:11   ABCD2 Score:    [id number] Score:   HAS -BLED Score:   ICH Score:   Hunt & Ha Classification:         Neurological Exam:   LOC: alert and does not follow requests  Speech: Mute  Motor*: moves all extremities spontaneously, does not follow commands  Laboratory:  BMP:   Lab Results   Component Value Date    GLUCOSE 189 (H) 09/22/2024     09/22/2024    K 4.2 09/22/2024     09/22/2024    CO2 23 09/22/2024    BUN 42.6 (H) 09/22/2024    CREATININE 1.95 (H) 09/22/2024    CALCIUM 9.7 09/22/2024     CBC:   Lab Results   Component Value Date    WBC 8.41 09/22/2024    RBC 4.29 (L) 09/22/2024    HGB 13.0 (L) 09/22/2024    HCT 40.4 (L) 09/22/2024      09/22/2024    MCV 94.2 (H) 09/22/2024    MCH 30.3 09/22/2024    MCHC 32.2 (L) 09/22/2024     Lipid Panel:   Lab Results   Component Value Date    CHOL 113 11/06/2024    CHOL 104 05/02/2023    LDLCALC 53 05/02/2023    HDL 37 11/06/2024    HDL 33 (A) 05/02/2023    TRIG 97 11/06/2024    TRIG 90 05/02/2023     Coagulation:   Lab Results   Component Value Date    INR 1.05 (L) 01/08/2023    APTT 30.9 01/08/2023     Hgb A1C:   Lab Results   Component Value Date    HGBA1C 9.0 (H) 11/06/2024     TSH:   Lab Results   Component Value Date    TSH 1.489 04/30/2024       Diagnostic Results  Brain Imaging:   -CTh: Preliminary: Impression:  1. Crescentic mixed predominantly hyperdense extra-axial collections are seen overlying both cerebral hemispheres with a maximum thickness of 1.1 cm in the right and 5.0 mm in the left. There is also hyperdense thickening of the anterior cerebral falx. These are consistent with acute subdural hematomas. Hyperdense collections are seen insinuating in the sulci of both frontal and right parietal lobes (image 37, series 2). This is compatible with subarachnoid hemorrhage. Correlate with clinical findings as regards additional evaluation and follow-up.  2. There is a linear non-displaced fracture in the occipital bone propagating inferiorly towards the region of the foramen magnum seen on images 10-26, series 4.  Cerebrovascular Imaging:   -CTA h/n:   Impression:  1. Moderate atheromatous calcification of the cavernous to supraclinoid segments of thesegments of the internal carotid artery is seen with associated mild to moderate stenosis.  2. Mild to moderate atheromatous changes of the M1 segment of the left middle cerebral artery is seen with associated mild stenosis (series 19, image 137).  3. Moderate to severe atheromatous change of the right left bilateral vertebral arteries is seen with associated moderate right greater than left stenosis (series 19, image 150).  4. Moderate atheromatous  calcification with mild stenosis is seen at the origin of the left common carotid artery.  5. Moderate atheromatous calcification of the right common carotid artery bulb is seen with associated moderate stenosis (series 19, image 289).  6. Moderate atheromatous calcification of the left common carotid artery bulb is seen with associated moderate stenosis (series 19, image 289).  7. 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  8. Minimal atheromatous calcification noted.    Cardiac Evaluation:   -EKG/Telemetry:       Discussed with neurologist on call: DR Pickett at 423pm   Thrombolysis Candidate? No, CT findings (ICH, SAH, Large core infarct)   -Delays to Thrombolysis?  Not Applicable    Interventional Revascularization Candidate? No; significant pre-stroke disability Acute traumatic SAH with acute inear non-displaced fracture in the occipital bone  -Delays to Thrombectomy? Not Applicable        PLAN:  acute traumatic subdural hematomas, subarachnoid hemorrhage with  linear non-displaced fracture in the occipital bone   - per ED plan consult neurosurgery  - stroke neurology will sign off, let us know if questions or concerns  - Other recommendations may follow from MD    Thank you for your consult.       Norma Galdamez NP  Neurology  [facility] - Emergency Dept

## 2024-11-30 NOTE — Clinical Note
Diagnosis: Traumatic subdural hematoma without loss of consciousness, initial encounter [[id number]]   Future Attending Provider: PAUL DO [[id number]]   Admit to which facility:: [facility] [[id number]]   Reason for IP Medical Treatment  (Clinical interventions that can only be accomplished in the IP setting? ) :: SDH

## 2024-12-01 LAB
ALBUMIN SERPL-MCNC: 3.1 G/DL (ref 3.4–4.8)
ALBUMIN/GLOB SERPL: 1 RATIO (ref 1.1–2)
ALP SERPL-CCNC: 75 UNIT/L (ref 40–150)
ALT SERPL-CCNC: 26 UNIT/L (ref 0–55)
ANION GAP SERPL CALC-SCNC: 13 MEQ/L
APICAL FOUR CHAMBER EJECTION FRACTION: 35 %
APICAL TWO CHAMBER EJECTION FRACTION: 45 %
AST SERPL-CCNC: 33 UNIT/L (ref 5–34)
AV INDEX (PROSTH): 0.18
AV MEAN GRADIENT: 46 MMHG
AV PEAK GRADIENT: 77.4 MMHG
AV REGURGITATION PRESSURE HALF TIME: 409 MS
AV VALVE AREA BY VELOCITY RATIO: 0.6 CM²
AV VALVE AREA: 0.6 CM²
AV VELOCITY RATIO: 0.2
BASOPHILS # BLD AUTO: 0.02 X10(3)/MCL
BASOPHILS NFR BLD AUTO: 0.1 %
BILIRUB SERPL-MCNC: 0.5 MG/DL
BSA FOR ECHO PROCEDURE: 2.14 M2
BUN SERPL-MCNC: 34.8 MG/DL (ref 8.4–25.7)
CALCIUM SERPL-MCNC: 8.6 MG/DL (ref 8.8–10)
CHLORIDE SERPL-SCNC: 112 MMOL/L (ref 98–107)
CO2 SERPL-SCNC: 18 MMOL/L (ref 23–31)
CREAT SERPL-MCNC: 2 MG/DL (ref 0.72–1.25)
CREAT/UREA NIT SERPL: 17
CV ECHO LV RWT: 0.55 CM
DOP CALC AO PEAK VEL: 4.4 M/S
DOP CALC AO VTI: 91.5 CM
DOP CALC LVOT AREA: 3.1 CM2
DOP CALC LVOT DIAMETER: 2 CM
DOP CALC LVOT PEAK VEL: 0.9 M/S
DOP CALC LVOT STROKE VOLUME: 50.9 CM3
DOP CALC MV VTI: 35.1 CM
DOP CALCLVOT PEAK VEL VTI: 16.2 CM
E WAVE DECELERATION TIME: 169 MSEC
E/A RATIO: 1.1
E/E' RATIO: 24.77 M/S
ECHO LV POSTERIOR WALL: 1.4 CM (ref 0.6–1.1)
EOSINOPHIL # BLD AUTO: 0 X10(3)/MCL (ref 0–0.9)
EOSINOPHIL NFR BLD AUTO: 0 %
ERYTHROCYTE [DISTWIDTH] IN BLOOD BY AUTOMATED COUNT: 15.3 % (ref 11.5–17)
FRACTIONAL SHORTENING: 19.6 % (ref 28–44)
GFR SERPLBLD CREATININE-BSD FMLA CKD-EPI: 32 ML/MIN/1.73/M2
GLOBULIN SER-MCNC: 3 GM/DL (ref 2.4–3.5)
GLUCOSE SERPL-MCNC: 230 MG/DL (ref 82–115)
HCT VFR BLD AUTO: 34.1 % (ref 42–52)
HGB BLD-MCNC: 10.9 G/DL (ref 14–18)
IMM GRANULOCYTES # BLD AUTO: 0.09 X10(3)/MCL (ref 0–0.04)
IMM GRANULOCYTES NFR BLD AUTO: 0.6 %
INTERVENTRICULAR SEPTUM: 1.3 CM (ref 0.6–1.1)
LACTATE SERPL-SCNC: 1.2 MMOL/L (ref 0.5–2.2)
LACTATE SERPL-SCNC: 1.6 MMOL/L (ref 0.5–2.2)
LACTATE SERPL-SCNC: 1.8 MMOL/L (ref 0.5–2.2)
LEFT ATRIUM AREA SYSTOLIC (APICAL 2 CHAMBER): 35.7 CM2
LEFT ATRIUM AREA SYSTOLIC (APICAL 4 CHAMBER): 42.6 CM2
LEFT ATRIUM SIZE: 4.8 CM
LEFT ATRIUM VOLUME INDEX MOD: 82.8 ML/M2
LEFT ATRIUM VOLUME MOD: 173 ML
LEFT INTERNAL DIMENSION IN SYSTOLE: 4.1 CM (ref 2.1–4)
LEFT VENTRICLE DIASTOLIC VOLUME INDEX: 60.29 ML/M2
LEFT VENTRICLE DIASTOLIC VOLUME: 126 ML
LEFT VENTRICLE END DIASTOLIC VOLUME APICAL 2 CHAMBER: 148 ML
LEFT VENTRICLE END DIASTOLIC VOLUME APICAL 4 CHAMBER: 159 ML
LEFT VENTRICLE END SYSTOLIC VOLUME APICAL 2 CHAMBER: 151 ML
LEFT VENTRICLE END SYSTOLIC VOLUME APICAL 4 CHAMBER: 190 ML
LEFT VENTRICLE MASS INDEX: 136.4 G/M2
LEFT VENTRICLE SYSTOLIC VOLUME INDEX: 34.9 ML/M2
LEFT VENTRICLE SYSTOLIC VOLUME: 72.9 ML
LEFT VENTRICULAR INTERNAL DIMENSION IN DIASTOLE: 5.1 CM (ref 3.5–6)
LEFT VENTRICULAR MASS: 285.1 G
LV LATERAL E/E' RATIO: 26.83 M/S
LV SEPTAL E/E' RATIO: 23 M/S
LVED V (TEICH): 126 ML
LVES V (TEICH): 72.9 ML
LVOT MG: 2 MMHG
LVOT MV: 0.63 CM/S
LYMPHOCYTES # BLD AUTO: 0.76 X10(3)/MCL (ref 0.6–4.6)
LYMPHOCYTES NFR BLD AUTO: 5.3 %
MCH RBC QN AUTO: 30.6 PG (ref 27–31)
MCHC RBC AUTO-ENTMCNC: 32 G/DL (ref 33–36)
MCV RBC AUTO: 95.8 FL (ref 80–94)
MONOCYTES # BLD AUTO: 1.1 X10(3)/MCL (ref 0.1–1.3)
MONOCYTES NFR BLD AUTO: 7.7 %
MV MEAN GRADIENT: 8 MMHG
MV PEAK A VEL: 1.46 M/S
MV PEAK E VEL: 1.61 M/S
MV PEAK GRADIENT: 13 MMHG
MV VALVE AREA BY CONTINUITY EQUATION: 1.45 CM2
NEUTROPHILS # BLD AUTO: 12.24 X10(3)/MCL (ref 2.1–9.2)
NEUTROPHILS NFR BLD AUTO: 86.3 %
NRBC BLD AUTO-RTO: 0 %
OHS LV EJECTION FRACTION SIMPSONS BIPLANE MOD: 40 %
OHS QRS DURATION: 130 MS
OHS QTC CALCULATION: 444 MS
PISA AR MAX VEL: 3.49 M/S
PISA TR MAX VEL: 3.3 M/S
PLATELET # BLD AUTO: 195 X10(3)/MCL (ref 130–400)
PMV BLD AUTO: 10 FL (ref 7.4–10.4)
POCT GLUCOSE: 210 MG/DL (ref 70–110)
POTASSIUM SERPL-SCNC: 4.5 MMOL/L (ref 3.5–5.1)
PROT SERPL-MCNC: 6.1 GM/DL (ref 5.8–7.6)
PV PEAK GRADIENT: 7 MMHG
PV PEAK VELOCITY: 1.33 M/S
RA PRESSURE ESTIMATED: 8 MMHG
RBC # BLD AUTO: 3.56 X10(6)/MCL (ref 4.7–6.1)
RV TB RVSP: 11 MMHG
SODIUM SERPL-SCNC: 143 MMOL/L (ref 136–145)
TDI LATERAL: 0.06 M/S
TDI SEPTAL: 0.07 M/S
TDI: 0.07 M/S
TR MAX PG: 44 MMHG
TRICUSPID ANNULAR PLANE SYSTOLIC EXCURSION: 1.63 CM
TROPONIN I SERPL-MCNC: 12.84 NG/ML (ref 0–0.04)
TROPONIN I SERPL-MCNC: 7.84 NG/ML (ref 0–0.04)
TV REST PULMONARY ARTERY PRESSURE: 52 MMHG
WBC # BLD AUTO: 14.21 X10(3)/MCL (ref 4.5–11.5)
Z-SCORE OF LEFT VENTRICULAR DIMENSION IN END DIASTOLE: -2.34
Z-SCORE OF LEFT VENTRICULAR DIMENSION IN END SYSTOLE: 0.31

## 2024-12-01 PROCEDURE — 63600175 PHARM REV CODE 636 W HCPCS

## 2024-12-01 PROCEDURE — 25000003 PHARM REV CODE 250

## 2024-12-01 PROCEDURE — 25000003 PHARM REV CODE 250: Performed by: SURGERY

## 2024-12-01 PROCEDURE — 85025 COMPLETE CBC W/AUTO DIFF WBC: CPT

## 2024-12-01 PROCEDURE — 25500020 PHARM REV CODE 255: Performed by: NURSE PRACTITIONER

## 2024-12-01 PROCEDURE — 63600175 PHARM REV CODE 636 W HCPCS: Performed by: SURGERY

## 2024-12-01 PROCEDURE — 83605 ASSAY OF LACTIC ACID: CPT

## 2024-12-01 PROCEDURE — 99223 1ST HOSP IP/OBS HIGH 75: CPT | Mod: AI,,, | Performed by: SURGERY

## 2024-12-01 PROCEDURE — 11000001 HC ACUTE MED/SURG PRIVATE ROOM

## 2024-12-01 PROCEDURE — 80053 COMPREHEN METABOLIC PANEL: CPT

## 2024-12-01 PROCEDURE — 84484 ASSAY OF TROPONIN QUANT: CPT | Performed by: EMERGENCY MEDICINE

## 2024-12-01 PROCEDURE — 36415 COLL VENOUS BLD VENIPUNCTURE: CPT

## 2024-12-01 RX ORDER — INSULIN ASPART 100 [IU]/ML
0-5 INJECTION, SOLUTION INTRAVENOUS; SUBCUTANEOUS EVERY 6 HOURS PRN
Status: DISCONTINUED | OUTPATIENT
Start: 2024-12-01 | End: 2024-12-03

## 2024-12-01 RX ORDER — GLUCAGON 1 MG
1 KIT INJECTION
Status: DISCONTINUED | OUTPATIENT
Start: 2024-12-01 | End: 2024-12-03

## 2024-12-01 RX ORDER — LEVETIRACETAM 500 MG/5ML
500 INJECTION, SOLUTION, CONCENTRATE INTRAVENOUS EVERY 12 HOURS
Status: DISCONTINUED | OUTPATIENT
Start: 2024-12-01 | End: 2024-12-01

## 2024-12-01 RX ADMIN — MUPIROCIN: 20 OINTMENT TOPICAL at 08:12

## 2024-12-01 RX ADMIN — INSULIN ASPART 2 UNITS: 100 INJECTION, SOLUTION INTRAVENOUS; SUBCUTANEOUS at 05:12

## 2024-12-01 RX ADMIN — MORPHINE SULFATE 2 MG: 4 INJECTION, SOLUTION INTRAMUSCULAR; INTRAVENOUS at 05:12

## 2024-12-01 RX ADMIN — LEVETIRACETAM 500 MG: 100 INJECTION, SOLUTION INTRAVENOUS at 08:12

## 2024-12-01 RX ADMIN — PERFLUTREN 1 ML: 6.52 INJECTION, SUSPENSION INTRAVENOUS at 12:12

## 2024-12-01 RX ADMIN — SODIUM CHLORIDE: 9 INJECTION, SOLUTION INTRAVENOUS at 03:12

## 2024-12-01 RX ADMIN — MUPIROCIN: 20 OINTMENT TOPICAL at 09:12

## 2024-12-01 RX ADMIN — LABETALOL HYDROCHLORIDE 10 MG: 5 INJECTION, SOLUTION INTRAVENOUS at 04:12

## 2024-12-01 RX ADMIN — LEVETIRACETAM 500 MG: 100 INJECTION, SOLUTION INTRAVENOUS at 09:12

## 2024-12-01 RX ADMIN — LABETALOL HYDROCHLORIDE 10 MG: 5 INJECTION, SOLUTION INTRAVENOUS at 01:12

## 2024-12-01 RX ADMIN — HYDRALAZINE HYDROCHLORIDE 10 MG: 20 INJECTION INTRAMUSCULAR; INTRAVENOUS at 10:12

## 2024-12-01 NOTE — CONSULTS
Inpatient consult to Cardiology  Consult performed by: Arjun Vargas ANP  Consult ordered by: Solange Montilla MD  Reason for consult: NSTEMI        [facility] *    Cardiology  Consult Note    Patient Name: Damon Moran  MRN: [id number]  Admission Date: 11/30/2024  Hospital Length of Stay: 1 days  Code Status: DNR   Attending Provider: Joey Pino MD   Consulting Provider: GRADY Wesley  Primary Care Physician: Renuka Lowery MD  Principal Problem:<principal problem not specified>    Patient information was obtained from patient, past medical records, and ER records.     Subjective:     Chief Complaint/Reason for Consult: NSTEMI     HPI: Mr. Moran is an 85 y/o male who is unknown to [facility]. The patient presented to [facility] on 11.30.24 via Transfer for a Fall from his WC with note L2 Trauma. The patient allegedly fell backwards in his wheel chair. EMS was notified by a sensor the PT wears. Upon arrival a CODE FAST was called as the PT had some Aphasia and was not following commands. Initial Workup revealed Acute Traumatic Subdural Hematomas, Subarachnoid Hemorrhages with Linear Non-Displaced Fractures in the Occipital Bone. He was admitted to the Trauma ICU and Neurosurgery was consulted for Recommendations. The patient was made a DNR by his Family and Neurosurgery signed off. The patient had an elevated Troponin that has not peaked. [facility] has been consulted for recommendations.     PMH: Atrial Tachycardia, CKD Stage III, HTN, EHSAN, RUPA/CPAP, Prostate Cancer, HLD, DM II, CVA  PSH: CBP, CABG, Angiogram, Finger Laceration Repair, Mole Removal  Family History: Mother, D, Sudden Death, Cancer; Father, D, Cancer  Social History: Denies Illicit Drug, ETOH and Tobacco Use     Previous Cardiac Diagnostics: None Available for Review     Review of patient's allergies indicates:   Allergen Reactions    Ramipril      tinnitus    Rosiglitazone      Dyspnea      Rosuvastatin      No current  facility-administered medications on file prior to encounter.     Current Outpatient Medications on File Prior to Encounter   Medication Sig    allopurinoL (ZYLOPRIM) 300 MG tablet Take 0.5 tablets (150 mg total) by mouth once daily.    aspirin 81 MG Chew Take 81 mg by mouth once daily.    atorvastatin (LIPITOR) 80 MG tablet Take 1 tablet (80 mg total) by mouth once daily.    empagliflozin (JARDIANCE) 25 mg tablet Take 1 tablet (25 mg total) by mouth once daily.    ezetimibe (ZETIA) 10 mg tablet Take 1 tablet (10 mg total) by mouth once daily.    furosemide (LASIX) 40 MG tablet Take 40 mg by mouth every Mon, Wed, Fri.    glimepiride (AMARYL) 4 MG tablet Take 0.5 tablets (2 mg total) by mouth before breakfast.    isosorbide mononitrate (IMDUR) 30 MG 24 hr tablet Take 15 mg by mouth once daily.    Lactobacillus acidophilus 500 million cell Cap Take 1 capsule by mouth nightly.    linaGLIPtin (TRADJENTA) 5 mg Tab tablet Take 1 tablet (5 mg total) by mouth once daily.    metoprolol succinate (TOPROL-XL) 50 MG 24 hr tablet Take 25 mg by mouth once daily.    blood sugar diagnostic Strp 1 strip by Misc.(Non-Drug; Combo Route) route 2 (two) times daily with meals.    blood-glucose meter kit Use as instructed (Patient taking differently: 1 each by Other route once daily. Use as instructed)    docusate sodium (COLACE) 50 MG capsule Take 1 capsule (50 mg total) by mouth 2 (two) times daily.    lancets Misc 1 lancet by Misc.(Non-Drug; Combo Route) route 2 (two) times daily with meals.    lancing device Misc 1 Device by Misc.(Non-Drug; Combo Route) route 2 (two) times daily with meals.     Review of Systems   Unable to perform ROS: Acuity of condition   Constitutional:  Positive for fatigue.   Cardiovascular:  Negative for palpitations and leg swelling.   Musculoskeletal:  Positive for neck pain.   Neurological:  Positive for speech difficulty, weakness and headaches.   All other systems reviewed and are negative.    Objective:      Vital Signs (Most Recent):  Temp: 98.7 °F (37.1 °C) (12/01/24 0800)  Pulse: 91 (12/01/24 0900)  Resp: 18 (12/01/24 0900)  BP: 103/89 (12/01/24 0900)  SpO2: 99 % (12/01/24 0900) Vital Signs (24h Range):  Temp:  [98.2 °F (36.8 °C)-100.4 °F (38 °C)] 98.7 °F (37.1 °C)  Pulse:  [] 91  Resp:  [10-31] 18  SpO2:  [92 %-100 %] 99 %  BP: ()/() 103/89   Weight: 95.3 kg (210 lb)  Body mass index is 31.93 kg/m².  SpO2: 99 %       Intake/Output Summary (Last 24 hours) at 12/1/2024 0956  Last data filed at 12/1/2024 0600  Gross per 24 hour   Intake 28.01 ml   Output 700 ml   Net -671.99 ml     Lines/Drains/Airways       Drain  Duration             Male External Urinary Catheter 11/30/24 1900 Small <1 day              Peripheral Intravenous Line  Duration                  Peripheral IV - Single Lumen 11/30/24 18 G Left Antecubital 1 day         Peripheral IV - Single Lumen 11/30/24 1652 20 G Anterior;Right Forearm <1 day                  Significant Labs:   Chemistries:   Recent Labs   Lab 11/30/24 1617 11/30/24 2119 12/01/24 0433 12/01/24 0918     --  143  --    K 3.9  --  4.5  --      --  112*  --    CO2 18*  --  18*  --    BUN 34.5*  --  34.8*  --    CREATININE 2.23*  --  2.00*  --    CALCIUM 9.2  --  8.6*  --    BILITOT 0.9  --  0.5  --    ALKPHOS 91  --  75  --    ALT 28  --  26  --    AST 24  --  33  --    GLUCOSE 221*  --  230*  --    TROPONINI 3.044* 2.474* 7.843* 12.844*        CBC/Anemia Labs: Coags:    Recent Labs   Lab 11/30/24  1617 12/01/24  0433   WBC 17.46  17.46* 14.21*   HGB 12.3* 10.9*   HCT 39.0* 34.1*    195   MCV 95.8* 95.8*   RDW 15.0 15.3    Recent Labs   Lab 11/30/24  1618   INR 1.1   APTT 27.8        Significant Imaging:  Imaging Results              CTA Head and Neck (xpd) (Final result)  Result time 12/01/24 09:47:12      Final result by Anita Otero MD (12/01/24 09:47:12)                   Impression:      1. Atherosclerotic changes at the carotid  bulbs and proximal internal carotid arteries 50-60% stenosis bilaterally.  2. 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  3. Moderate narrowing at the right carotid terminus.  4. Moderate multifocal narrowing of the right vertebral artery V4 segment, with mild narrowing of the basilar and posterior cerebral arteries.  No major change from the [facility] interpretation.      Electronically signed by: Anita Otero  Date:    12/01/2024  Time:    09:47               Narrative:    EXAMINATION:  CTA HEAD AND NECK (XPD)    CLINICAL HISTORY:  Neuro deficit, acute, stroke suspected;    TECHNIQUE:  Axial images obtained through the cervical region and [place] of [place] before and after the administration of intravenous contrast.    Coronal, sagittal, MIP and 3D reconstructions were obtained from the axial data.    Automatic exposure control was utilized to limit radiation dose.    Radiation Dose:    Total DLP: 2511 mGy*cm    COMPARISON:  CT head and cervical spine dated 11/30/2024    FINDINGS:  Head CT with contrast:    Unchanged subdural, parenchymal, subarachnoid and intraventricular hemorrhages..    No enhancing abnormalities.    If present, stenosis of the carotid bulbs is measured based on NASCET criteria,    i.e. area of maximal stenosis compared to the cervical ICA distal to the bulb.    Cervical CTA:    There are extensive atherosclerotic calcifications along the aortic arch and at the origins of the great vessels, with mild narrowing of the origins of the left common carotid artery and subclavian artery.    The common carotid arteries are patent with mild narrowing on the left.  There are atherosclerotic calcifications at the carotid bulbs and proximal internal carotid arteries with 50-60% stenosis bilaterally.  The internal carotid arteries are patent with mild scattered  calcifications on the right.    The right vertebral artery is patent with moderate narrowing at its origin.  There is non opacification at the origin of the left vertebral artery, with partial reconstitution of flow in the V2 and V3 segments with moderate multifocal stenosis.    Intracranial CTA:    There are calcifications along the course the carotid siphons with mild narrowing bilaterally.  There is moderate narrowing at the right carotid terminus.  The middle cerebral arteries and anterior cerebral arteries appear patent.    There is minimal opacification in the proximal V4 vertebral body, with otherwise non opacification.  There is moderate multifocal narrowing of the right vertebral artery.  The basilar artery is patent although small in caliber with mild narrowing.  The bilateral posterior cerebral arteries are patent with mild narrowing bilaterally.    The neural venous sinuses are patent.                        Preliminary result by Derik Allred MD (11/30/24 17:49:16)                   Impression:    1. Moderate atheromatous calcification of the cavernous to supraclinoid segments of thesegments of the internal carotid artery is seen with associated mild to moderate stenosis.  2. Mild to moderate atheromatous changes of the M1 segment of the left middle cerebral artery is seen with associated mild stenosis (series 19, image 137).  3. Moderate to severe atheromatous change of the right left bilateral vertebral arteries is seen with associated moderate right greater than left stenosis (series 19, image 150).  4. Moderate atheromatous calcification with mild stenosis is seen at the origin of the left common carotid artery.  5. Moderate atheromatous calcification of the right common carotid artery bulb is seen with associated moderate stenosis (series 19, image 289).  6. Moderate atheromatous calcification of the left common carotid artery bulb is seen with associated moderate stenosis (series 19, image  289).  7. 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  8. Minimal atheromatous calcification noted.  9. There is no aneursym or dissection identified.               Narrative:    START OF REPORT:  Technique: CT angiogram of the intracranial and neck vessels was performed with intravenous contrast with direct axial as well as sagittal and coronal reformations.    Comparison: Comparison is with study dated 2024-09-22 04:20:25.    Clinical history: Trauma 2/ Stroke- fell out of walker and hit head and is extremely altered and aggressive.    Findings:  Intracranial Vascular structures:  Internal carotid arteries: Moderate atheromatous calcification of the cavernous to supraclinoid segments of thesegments of the internal carotid artery is seen with associated mild to moderate stenosis.  Middle cerebral arteries: Mild to moderate atheromatous changes of the M1 segment of the left middle cerebral artery is seen with associated mild stenosis (series 19, image 137).  Anterior cerebral arteries: Unremarkable.  Vertebral arteries: Moderate to severe atheromatous change of the right left bilateral vertebral arteries is seen with associated moderate right greater than left stenosis (series 19, image 150).  Basilar artery: Minimal atheromatous calcification noted.  Posterior cerebral arteries: Unremarkable.  Posterior communicating arteries: Unremarkable.  Neck Vascular structures: Moderate atheromatous calcification with mild stenosis is seen at the origin of the left common carotid artery. Mild atheromatous calcifications with no associated stenosis at the origin of the right brachiocephalic, right common carotid and both subclavian arteries.  Common carotid arteries:  Right common carotid artery: Moderate atheromatous calcification of the right common carotid artery bulb is seen with associated moderate stenosis (series 19, image 289).  Left common carotid artery: Moderate  atheromatous calcification of the left common carotid artery bulb is seen with associated moderate stenosis (series 19, image 289).  Vertebral arteries: There is severe occlusion of the foraminal segment of the  segment of the left vertebral artery.                                         CT HEAD FOR STROKE (Final result)  Result time 12/01/24 09:02:13   Procedure changed from CT Head Without Contrast     Final result by Anita Otero MD (12/01/24 09:02:13)                   Impression:      1. Bilateral subdural, subarachnoid and intraventricular hemorrhages.  2. Parenchymal hemorrhages in the bilateral frontal and temporal lobes with surrounding edema.  3. Mass effect with 1-2 mm rightward midline shift.  No major change from the [facility] interpretation.      Electronically signed by: Anita Otero  Date:    12/01/2024  Time:    09:02               Narrative:    EXAMINATION:  CT HEAD FOR STROKE    CLINICAL HISTORY:  Trauma;    TECHNIQUE:  Axial scans were obtained from skull base to the vertex.    Coronal and sagittal reconstructions obtained from the axial data.    Automatic exposure control was utilized to limit radiation dose.    Contrast: None    Radiation Dose:    Total DLP: 1062 mGy*cm    COMPARISON:  CT head dated 09/22/2024    FINDINGS:  There are mixed density bilateral cerebral convexity subdural hemorrhages measuring up to 1.4 cm in thickness.  There is small parenchymal hemorrhages in the bilateral frontal and temporal lobes with surrounding edema.  There is a small volume of scattered bilateral subarachnoid hemorrhage.  Trace interventricular hemorrhage is seen layering in the lateral ventricles.  There is mass effect with sulcal effacement and 1-2 mm of rightward midline shift.  The basal cisterns are patent.  There is a nondisplaced occipital bone fracture.                        Preliminary result by Derik Allred MD (11/30/24 17:38:25)                   Impression:    1. Crescentic  mixed predominantly hyperdense extra-axial collections are seen overlying both cerebral hemispheres with a maximum thickness of 1.1 cm in the right and 5.0 mm in the left. There is also hyperdense thickening of the anterior cerebral falx. These are consistent with acute subdural hematomas. Hyperdense collections are seen insinuating in the sulci of both frontal and right parietal lobes (image 37, series 2). This is compatible with subarachnoid hemorrhage. Correlate with clinical findings as regards additional evaluation and follow-up.  2. There is a linear non-displaced fracture in the occipital bone propagating inferiorly towards the region of the foramen magnum seen on images 10-26, series 4.  3. Details and other findings as noted above.               Narrative:    START OF REPORT:  Technique: CT of the head was performed without intravenous contrast with axial as well as coronal and sagittal images.    Comparison: Comparison is with study dated 2024-09-22 04:20:25.    Dosage Information: Automated Exposure Control was utilized 1061.63 mGy.cm.    Clinical history: Trauma 2/ Stroke- fell out of walker and hit head and is extremely altered and aggressive.    Findings:  Hemorrhage: Crescentic mixed predominantly hyperdense extra-axial collections are seen overlying both cerebral hemispheres with a maximum thickness of 1.1 cm in the right and 5.0 mm in the left. There is also hyperdense thickening of the anterior cerebral falx. These are consistent with acute subdural hematomas. Hyperdense collections are seen insinuating in the sulci of both frontal and right parietal lobes (image 37, series 2). This is compatible with subarachnoid hemorrhage.  CSF spaces: The ventricles, sulci and basal cisterns all appear prominent consistent with global cerebral atrophy.  Brain parenchyma: There is preservation of the grey white junction throughout. No acute infarct is identified. Moderate microvascular change is seen in portions  of the periventricular and deep white matter tracts.  Cerebellum: Unremarkable.  Sella and skull base: The sella appears to be within normal limits for age.  Intracranial calcifications: Incidental note is made of bilateral choroid plexus calcification. Incidental note is made of some pineal region calcification. Incidental note is made of some calcification of the falx.  Calvarium: There is a linear non-displaced fracture in the occipital bone propagating inferiorly towards the region of the foramen magnum seen on images 10-26, series 4.    Maxillofacial Structures:  Paranasal sinuses: Opacities are seen in both ethmoid and right maxillary sinuses. Mucoperiosteal thickening is seen in the left maxillary and both sphenoid sinuses. This may reflect polysinusitis with acute and chronic components.  Orbits: The orbits appear unremarkable.  Zygomatic arches: The zygomatic arches are intact and unremarkable.  Temporal bones and mastoids: The temporal bones and mastoids appear unremarkable.  TMJ: The mandibular condyles appear normally placed with respect to the mandibular fossa.    Notifications: The results were discussed with the emergency room physician prior to dictation at 2024-11-30 17:38:12 CST.                                         CT Cervical Spine Without Contrast (Final result)  Result time 12/01/24 08:52:44      Final result by Anita Otero MD (12/01/24 08:52:44)                   Impression:      Evaluation limited by motion artifact.  No acute fracture identified.    No significant change from the [facility] interpretation      Electronically signed by: Anita Otero  Date:    12/01/2024  Time:    08:52               Narrative:    EXAMINATION:  CT CERVICAL SPINE WITHOUT CONTRAST    CLINICAL HISTORY:  Trauma;    TECHNIQUE:  Noncontrast CT images of the cervical spine. Axial, coronal, and sagittal reformatted images were obtained. Dose length product is 781 mGycm. Automatic exposure control,  adjustment of mA/kV or iterative reconstruction technique was used to limit radiation dose.    COMPARISON:  CT cervical spine dated 09/22/2024    FINDINGS:  The cervical spine is visualized through the level of C7-T1.    Evaluation is limited by motion artifact.  There is no acute fracture identified.  There are multilevel degenerative changes with disc height loss, marginal osteophyte formation and facet arthropathy.  There is no paraspinal hematoma.                        Preliminary result by Derik Allred MD (11/30/24 17:28:55)                   Impression:    1. No acute cervical spine fracture dislocation or subluxation is seen.  2. Moderate opacity is seen in the left mastoid air cells. This may represent hemosinus.  3. Partially visualized is the linear fracture involving the mildine and left occipital bone extending to the foramen magnum and left mastoid bone.  4. Degenerative changes and other details as above.               Narrative:    START OF REPORT:  Technique: CT of the cervical spine was performed without intravenous contrast with axial as well as sagittal and coronal images.    Comparison: None.    Dosage Information: Automated exposure control was utilized.    Clinical history: Trauma 2/ Stroke- fell out of walker and hit head and is extremely altered and aggressive.    Findings:  Spine:  Mineralization: Osteopenia is seen in the visualized bony structures of the cervical spine. There is a well-circumscribed ovoid lytic focus with sclerotic border involving the left side of the dens. This may represent a bone cyst.  Rotation: No significant rotation is seen.  Scoliosis: No significant scoliosis is seen.  Vertebral Fusion: No vertebral fusion is identified.  Listhesis: No significant listhesis is identified.  Lordosis: Moderate straightening of the cervical lordosis is seen.  Intervertebral disc spaces: Multilevel loss of disc height is seen.  Osteophytes: Mild multilevel endplate osteophytes are  seen.  Endplate Sclerosis: Multilevel endplate sclerosis is seen.  Uncovertebral degenerative changes: Mild multilevel uncovertebral joint arthrosis is seen.  Facet degenerative changes: Moderate multilevel facet degenerative changes are seen.  Fractures: No acute cervical spine fracture dislocation or subluxation is seen.    Miscellaneous: Partially visualized is the linear fracture involving the mildine and left occipital bone extending to the foramen magnum and left mastoid bone. Details of this findings will be discussed in a separate head CT report.  Mastoid air cells: Moderate opacity is seen in the left mastoid air cells. This may represent hemosinus.                                         X-Ray Pelvis Routine AP (Final result)  Result time 11/30/24 16:28:53      Final result by Michael Sweet MD (11/30/24 16:28:53)                   Impression:      No acute osseous process appreciated.      Electronically signed by: Michael Sweet  Date:    11/30/2024  Time:    16:28               Narrative:    EXAMINATION:  XR PELVIS ROUTINE AP    CLINICAL HISTORY:  r/o bleeding or hemorrhage;    TECHNIQUE:  AP view of the pelvis.    COMPARISON:  None    FINDINGS:  Mildly limited evaluation due to underpenetration.  No defined acute fracture.  Hips and symphysis grossly aligned.                                       X-Ray Chest 1 View (Final result)  Result time 11/30/24 16:27:02      Final result by Michael Sweet MD (11/30/24 16:27:02)                   Impression:      Mild perihilar vascular prominence.      Electronically signed by: Michael Sweet  Date:    11/30/2024  Time:    16:27               Narrative:    EXAMINATION:  XR CHEST 1 VIEW    CLINICAL HISTORY:  r/o bleeding or hemorrhage;    TECHNIQUE:  Frontal view(s) of the chest.    COMPARISON:  Radiography 08/12/2016    FINDINGS:  Previous sternotomy.  Cardiac silhouette upper normal in size.  The lungs are well-inflated.  Mild perihilar vascular prominence.   No defined consolidation.  No significant pleural effusion or discernible pneumothorax.                                    EKG:       Telemetry: SR/ST with PVCs    Physical Exam  Constitutional:       General: He is not in acute distress.     Appearance: Normal appearance. He is ill-appearing.   HENT:      Head: Normocephalic.      Mouth/Throat:      Mouth: Mucous membranes are moist.   Eyes:      Extraocular Movements: Extraocular movements intact.   Cardiovascular:      Rate and Rhythm: Normal rate and regular rhythm.      Pulses: Normal pulses.      Heart sounds: Normal heart sounds.   Pulmonary:      Effort: Pulmonary effort is normal. No respiratory distress.      Breath sounds: Normal breath sounds.   Abdominal:      Palpations: Abdomen is soft.   Skin:     General: Skin is warm and dry.   Neurological:      Mental Status: He is alert and oriented to person, place, and time. Mental status is at baseline.      Comments: Moves RUE Spontaneously   Psychiatric:         Mood and Affect: Mood normal.         Behavior: Behavior normal.       Home Medications:   No current facility-administered medications on file prior to encounter.     Current Outpatient Medications on File Prior to Encounter   Medication Sig Dispense Refill    allopurinoL (ZYLOPRIM) 300 MG tablet Take 0.5 tablets (150 mg total) by mouth once daily. 90 tablet 3    aspirin 81 MG Chew Take 81 mg by mouth once daily.      atorvastatin (LIPITOR) 80 MG tablet Take 1 tablet (80 mg total) by mouth once daily. 90 tablet 3    empagliflozin (JARDIANCE) 25 mg tablet Take 1 tablet (25 mg total) by mouth once daily. 90 tablet 3    ezetimibe (ZETIA) 10 mg tablet Take 1 tablet (10 mg total) by mouth once daily. 90 tablet 3    furosemide (LASIX) 40 MG tablet Take 40 mg by mouth every Mon, Wed, Fri.      glimepiride (AMARYL) 4 MG tablet Take 0.5 tablets (2 mg total) by mouth before breakfast.      isosorbide mononitrate (IMDUR) 30 MG 24 hr tablet Take 15 mg by  mouth once daily.      Lactobacillus acidophilus 500 million cell Cap Take 1 capsule by mouth nightly. 30 capsule 3    linaGLIPtin (TRADJENTA) 5 mg Tab tablet Take 1 tablet (5 mg total) by mouth once daily. 90 tablet 3    metoprolol succinate (TOPROL-XL) 50 MG 24 hr tablet Take 25 mg by mouth once daily.      blood sugar diagnostic Strp 1 strip by Misc.(Non-Drug; Combo Route) route 2 (two) times daily with meals. 100 strip 11    blood-glucose meter kit Use as instructed (Patient taking differently: 1 each by Other route once daily. Use as instructed) 1 each 0    docusate sodium (COLACE) 50 MG capsule Take 1 capsule (50 mg total) by mouth 2 (two) times daily. 60 capsule 3    lancets Misc 1 lancet by Misc.(Non-Drug; Combo Route) route 2 (two) times daily with meals. 100 each 11    lancing device Misc 1 Device by Misc.(Non-Drug; Combo Route) route 2 (two) times daily with meals. 1 each 0     Current Schedule Inpatient Medications:   acetaminophen  650 mg Oral Q4H    docusate sodium  100 mg Oral BID    levETIRAcetam (Keppra) IV (PEDS and ADULTS)  500 mg Intravenous Q12H    methocarbamoL  500 mg Oral Q8H    mupirocin   Nasal BID    polyethylene glycol  17 g Oral BID     Continuous Infusions:   0.9% NaCl   Intravenous Continuous 100 mL/hr at 11/30/24 1732 New Bag at 11/30/24 1732    nicardipine  0-15 mg/hr Intravenous Continuous 12.5 mL/hr at 11/30/24 1823 2.5 mg/hr at 11/30/24 1823     Assessment:   Traumatic Fall from WC Striking Head resulting in Acute Traumatic Subdural Hematomas, Subarachnoid Hemorrhages with Linear Non-Displaced Fractures in the Occipital Bone  NSTEMI - Unspecified    - Troponin Not Peaked - (> 12)  Leukocytosis   HTN  EHSAN  NOE/CKD Stage III  DM II  Hx of CVA  Hx of Atrial Tachycardia  Hx of Prostate Cancer  RUPA/CPAP  HLD    Plan:   Trend Cardiac Biomarkers Till Peaked  ECHO Today  Unable to Treat NSTEMI or Perform an Ischemic Evaluation given Neurological Findings  Will Continue to Follow  Labs and  EKG in AM: CBC, CMP and Mg     Thank you for your consult.     Arjun Vargas, GRADY  Cardiology  [facility]     I agree with the findings of the complexity of problems addressed and take responsibility for the plan's risks and complications. I approved the plan documented by Arjun Vargas NP.

## 2024-12-01 NOTE — PLAN OF CARE
Conversation had with family bedside. Due to patients advanced age and multiple medical coomorbities they are concerned with patient's ability to have a meaningful recovery s/p fall with ICH. They believe strongly, based on previous conversations w/ the patient, that his wishes would be to avoid invasive measures at this time which include surgical management of his ICH, CPR, and intubation. Family consensus is to proceed with changing code status to DNR/DNI with further GOC conversation to be had after [facility]'s C[facility]. Family is requesting palliative consultation to further discuss options concerning comfort measures but would like to wait until further NSGY evaluation before proceeding with this option. Patient's nurse was present and bedside during the entirety of conversation.

## 2024-12-01 NOTE — CONSULTS
Inpatient consult to Neurosurgery  Consult performed by: Win Stroud AGACNP-BC  Consult ordered by: Solange Montilla MD      Please see full neurosurgical dictation

## 2024-12-01 NOTE — CONSULTS
[facility]                       [address] [place] [id number]    PATIENT NAME:       ISABELLA RUTH  YOB: 1940  CSN:                [id number]   MRN:                [id number]  ADMIT DATE:         11/30/2024 16:08:00  PHYSICIAN:          Dotty Sims MD                            CONSULTATION    DATE OF CONSULT:  11/30/2024 00:00:00    I got a call 10 minutes ago by Dr. Hanley on the patient, who is 84 years old,   MRN #[id number], gentleman apparently fell, hit his head from a wheelchair.  He is   not on any blood thinners.  He is confused.  He was brought over here.  He had   a CT done, and was called about a subdural on the front side of the head and   also some contusions.  Currently, has GCS of 12.  His eyes are open on his own.    He is 3 on the verbal part because he had some inappropriate statements and on   the  he is clearly localizing is a 5, GCS 12 at this time.  I looked at the   CT.  He has a subdural contusion.  He is not on blood thinners.    IMPRESSION AND PLAN:  The patient has a GCS 12 with a subdural on the right   side.  He is 84 years old, I am going to watch in ICU, will repeat CAT scan at   10 in the evening.  If he worsens or if the family still wants surgery, we will   proceed, but hopefully we will try to avoid considering his age.        ______________________________  Dotty Sims MD    IM/AQS  DD:  11/30/2024  Time:  05:00PM  DT:  11/30/2024  Time:  07:26PM  Job #:  [id number]      CONSULTATION

## 2024-12-01 NOTE — PROGRESS NOTES
TERTIARY TRAUMA SURVEY (TTS)    List Injuries Identified to Date:   1. Sdh  2. Cardiac contusion  3. hyperttension    []Problems list reviewed  List Operations and Procedures:   1. none    Past Surgical History:   Procedure Laterality Date    CBP      CORONARY ARTERY BYPASS GRAFT (CABG)      x 3    Repair of finger laceration      resection of atypical mole         Incidental findings:   1. none    Past Medical History:   1. unknown    Active Ambulatory Problems     Diagnosis Date Noted    Type 2 diabetes mellitus with stage 3a chronic kidney disease, without long-term current use of insulin 06/16/2022    Severe obesity (BMI 35.0-39.9) with comorbidity 06/16/2022    Essential (primary) hypertension 06/16/2022    Hyperlipidemia 06/16/2022    Coronary artery disease 06/16/2022    Male hypogonadism 06/16/2022    LVH (left ventricular hypertrophy) due to hypertensive disease, without heart failure 06/16/2022    Diastolic dysfunction 06/16/2022    Prostate cancer 06/16/2022    Iron deficiency anemia 06/16/2022    History of kidney stones 06/16/2022    Sleep apnea 06/16/2022    OA (osteoarthritis) of knee 06/16/2022    Tinnitus 06/16/2022    Sciatica 06/16/2022    Stage 3b chronic kidney disease 08/11/2022    Hematochezia 01/09/2023    Fall 01/19/2023    Shoulder pain 01/19/2023    Hyperparathyroidism, unspecified 08/09/2023    Chronic diastolic heart failure 08/09/2023    Anemia 03/13/2024    Mild cognitive impairment 03/13/2024    Aortic atherosclerosis 03/13/2024    Valvular heart disease 08/07/2024     Resolved Ambulatory Problems     Diagnosis Date Noted    Atrial paroxysmal tachycardia 06/16/2022    Encounter for Medicare annual wellness exam 08/11/2022    Lower GI bleed 01/08/2023    Atrial fibrillation 03/13/2024    Rectal bleeding 03/13/2024    Stage 3b chronic kidney disease 03/13/2024     Past Medical History:   Diagnosis Date    Chronic kidney disease, stage 3     Coronary arteriosclerosis     HTN  (hypertension)     Iron deficiency anemia, unspecified     Obstructive sleep apnea syndrome     Pure hypercholesterolemia     Type 2 diabetes mellitus without complications      Past Medical History:   Diagnosis Date    Atrial paroxysmal tachycardia     Chronic kidney disease, stage 3     Coronary arteriosclerosis     Diastolic dysfunction     HTN (hypertension)     Iron deficiency anemia, unspecified     Obstructive sleep apnea syndrome     Prostate cancer     Pure hypercholesterolemia     Type 2 diabetes mellitus without complications        Tertiary Physical Exam:     Physical Exam  General: Well developed, well nourished, no acute distress, AAOx3  Neuro: CNII-XII grossly intact  HEENT:  Normocephalic, atraumatic, pupils 3mm and sluggishly reactive,   CV:  tachycardic  Pulse: 2+ RP b/l, 2+ DP b/l   Resp/chest:  Non-labored breathing, satting on room air  GI:  Abdomen soft, non-tender, non-distended  :  deferred.   Rectal: deferred.  Extremities: Moves RUE spontaneously, no obvious gross deformities.  Back/Spine: No bony TTP, no palpable step offs or deformities.  Cervical back: Normal. No tenderness.  Thoracic back: Normal. No tenderness.  Lumbar back: Normal. No tenderness.  Skin/wounds:  Warm, well perfused.  Psych: Normal mood and affect.  Imaging Review:     Imaging Results              CTA Head and Neck (xpd) (Preliminary result)  Result time 11/30/24 17:49:16      Preliminary result by Derik Allred MD (11/30/24 17:49:16)                   Narrative:    START OF REPORT:  Technique: CT angiogram of the intracranial and neck vessels was performed with intravenous contrast with direct axial as well as sagittal and coronal reformations.    Comparison: Comparison is with study dated 2024-09-22 04:20:25.    Clinical history: Trauma 2/ Stroke- fell out of walker and hit head and is extremely altered and aggressive.    Findings:  Intracranial Vascular structures:  Internal carotid arteries: Moderate atheromatous  calcification of the cavernous to supraclinoid segments of thesegments of the internal carotid artery is seen with associated mild to moderate stenosis.  Middle cerebral arteries: Mild to moderate atheromatous changes of the M1 segment of the left middle cerebral artery is seen with associated mild stenosis (series 19, image 137).  Anterior cerebral arteries: Unremarkable.  Vertebral arteries: Moderate to severe atheromatous change of the right left bilateral vertebral arteries is seen with associated moderate right greater than left stenosis (series 19, image 150).  Basilar artery: Minimal atheromatous calcification noted.  Posterior cerebral arteries: Unremarkable.  Posterior communicating arteries: Unremarkable.  Neck Vascular structures: Moderate atheromatous calcification with mild stenosis is seen at the origin of the left common carotid artery. Mild atheromatous calcifications with no associated stenosis at the origin of the right brachiocephalic, right common carotid and both subclavian arteries.  Common carotid arteries:  Right common carotid artery: Moderate atheromatous calcification of the right common carotid artery bulb is seen with associated moderate stenosis (series 19, image 289).  Left common carotid artery: Moderate atheromatous calcification of the left common carotid artery bulb is seen with associated moderate stenosis (series 19, image 289).  Vertebral arteries: There is severe occlusion of the foraminal segment of the  segment of the left vertebral artery.      Impression:  1. Moderate atheromatous calcification of the cavernous to supraclinoid segments of thesegments of the internal carotid artery is seen with associated mild to moderate stenosis.  2. Mild to moderate atheromatous changes of the M1 segment of the left middle cerebral artery is seen with associated mild stenosis (series 19, image 137).  3. Moderate to severe atheromatous change of the right left bilateral vertebral arteries  is seen with associated moderate right greater than left stenosis (series 19, image 150).  4. Moderate atheromatous calcification with mild stenosis is seen at the origin of the left common carotid artery.  5. Moderate atheromatous calcification of the right common carotid artery bulb is seen with associated moderate stenosis (series 19, image 289).  6. Moderate atheromatous calcification of the left common carotid artery bulb is seen with associated moderate stenosis (series 19, image 289).  7. 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  8. Minimal atheromatous calcification noted.  9. There is no aneursym or dissection identified.                                         CT HEAD FOR STROKE (Preliminary result)  Result time 11/30/24 17:38:25   Procedure changed from CT Head Without Contrast     Preliminary result by Derik Allred MD (11/30/24 17:38:25)                   Narrative:    START OF REPORT:  Technique: CT of the head was performed without intravenous contrast with axial as well as coronal and sagittal images.    Comparison: Comparison is with study dated 2024-09-22 04:20:25.    Dosage Information: Automated Exposure Control was utilized 1061.63 mGy.cm.    Clinical history: Trauma 2/ Stroke- fell out of walker and hit head and is extremely altered and aggressive.    Findings:  Hemorrhage: Crescentic mixed predominantly hyperdense extra-axial collections are seen overlying both cerebral hemispheres with a maximum thickness of 1.1 cm in the right and 5.0 mm in the left. There is also hyperdense thickening of the anterior cerebral falx. These are consistent with acute subdural hematomas. Hyperdense collections are seen insinuating in the sulci of both frontal and right parietal lobes (image 37, series 2). This is compatible with subarachnoid hemorrhage.  CSF spaces: The ventricles, sulci and basal cisterns all appear prominent consistent with global  cerebral atrophy.  Brain parenchyma: There is preservation of the grey white junction throughout. No acute infarct is identified. Moderate microvascular change is seen in portions of the periventricular and deep white matter tracts.  Cerebellum: Unremarkable.  Sella and skull base: The sella appears to be within normal limits for age.  Intracranial calcifications: Incidental note is made of bilateral choroid plexus calcification. Incidental note is made of some pineal region calcification. Incidental note is made of some calcification of the falx.  Calvarium: There is a linear non-displaced fracture in the occipital bone propagating inferiorly towards the region of the foramen magnum seen on images 10-26, series 4.    Maxillofacial Structures:  Paranasal sinuses: Opacities are seen in both ethmoid and right maxillary sinuses. Mucoperiosteal thickening is seen in the left maxillary and both sphenoid sinuses. This may reflect polysinusitis with acute and chronic components.  Orbits: The orbits appear unremarkable.  Zygomatic arches: The zygomatic arches are intact and unremarkable.  Temporal bones and mastoids: The temporal bones and mastoids appear unremarkable.  TMJ: The mandibular condyles appear normally placed with respect to the mandibular fossa.    Notifications: The results were discussed with the emergency room physician prior to dictation at 2024-11-30 17:38:12 CST.      Impression:  1. Crescentic mixed predominantly hyperdense extra-axial collections are seen overlying both cerebral hemispheres with a maximum thickness of 1.1 cm in the right and 5.0 mm in the left. There is also hyperdense thickening of the anterior cerebral falx. These are consistent with acute subdural hematomas. Hyperdense collections are seen insinuating in the sulci of both frontal and right parietal lobes (image 37, series 2). This is compatible with subarachnoid hemorrhage. Correlate with clinical findings as regards additional  evaluation and follow-up.  2. There is a linear non-displaced fracture in the occipital bone propagating inferiorly towards the region of the foramen magnum seen on images 10-26, series 4.  3. Details and other findings as noted above.                                         CT Cervical Spine Without Contrast (Preliminary result)  Result time 11/30/24 17:28:55      Preliminary result by Derik Allred MD (11/30/24 17:28:55)                   Narrative:    START OF REPORT:  Technique: CT of the cervical spine was performed without intravenous contrast with axial as well as sagittal and coronal images.    Comparison: None.    Dosage Information: Automated exposure control was utilized.    Clinical history: Trauma 2/ Stroke- fell out of walker and hit head and is extremely altered and aggressive.    Findings:  Spine:  Mineralization: Osteopenia is seen in the visualized bony structures of the cervical spine. There is a well-circumscribed ovoid lytic focus with sclerotic border involving the left side of the dens. This may represent a bone cyst.  Rotation: No significant rotation is seen.  Scoliosis: No significant scoliosis is seen.  Vertebral Fusion: No vertebral fusion is identified.  Listhesis: No significant listhesis is identified.  Lordosis: Moderate straightening of the cervical lordosis is seen.  Intervertebral disc spaces: Multilevel loss of disc height is seen.  Osteophytes: Mild multilevel endplate osteophytes are seen.  Endplate Sclerosis: Multilevel endplate sclerosis is seen.  Uncovertebral degenerative changes: Mild multilevel uncovertebral joint arthrosis is seen.  Facet degenerative changes: Moderate multilevel facet degenerative changes are seen.  Fractures: No acute cervical spine fracture dislocation or subluxation is seen.    Miscellaneous: Partially visualized is the linear fracture involving the mildine and left occipital bone extending to the foramen magnum and left mastoid bone. Details of this  findings will be discussed in a separate head CT report.  Mastoid air cells: Moderate opacity is seen in the left mastoid air cells. This may represent hemosinus.      Impression:  1. No acute cervical spine fracture dislocation or subluxation is seen.  2. Moderate opacity is seen in the left mastoid air cells. This may represent hemosinus.  3. Partially visualized is the linear fracture involving the mildine and left occipital bone extending to the foramen magnum and left mastoid bone.  4. Degenerative changes and other details as above.                                         X-Ray Pelvis Routine AP (Final result)  Result time 11/30/24 16:28:53      Final result by Michael Sweet MD (11/30/24 16:28:53)                   Impression:      No acute osseous process appreciated.      Electronically signed by: Michael Sweet  Date:    11/30/2024  Time:    16:28               Narrative:    EXAMINATION:  XR PELVIS ROUTINE AP    CLINICAL HISTORY:  r/o bleeding or hemorrhage;    TECHNIQUE:  AP view of the pelvis.    COMPARISON:  None    FINDINGS:  Mildly limited evaluation due to underpenetration.  No defined acute fracture.  Hips and symphysis grossly aligned.                                       X-Ray Chest 1 View (Final result)  Result time 11/30/24 16:27:02      Final result by Michael Sweet MD (11/30/24 16:27:02)                   Impression:      Mild perihilar vascular prominence.      Electronically signed by: Michael Sweet  Date:    11/30/2024  Time:    16:27               Narrative:    EXAMINATION:  XR CHEST 1 VIEW    CLINICAL HISTORY:  r/o bleeding or hemorrhage;    TECHNIQUE:  Frontal view(s) of the chest.    COMPARISON:  Radiography 08/12/2016    FINDINGS:  Previous sternotomy.  Cardiac silhouette upper normal in size.  The lungs are well-inflated.  Mild perihilar vascular prominence.  No defined consolidation.  No significant pleural effusion or discernible pneumothorax.                                        Lab Review:   CBC:  Recent Labs   Lab Result Units 09/22/24  0431 11/30/24  1617 12/01/24  0433   WBC x10(3)/mcL 8.41 17.46  17.46* 14.21*   RBC x10(6)/mcL 4.29* 4.07* 3.56*   Hgb g/dL 13.0* 12.3* 10.9*   Hct % 40.4* 39.0* 34.1*   Platelet x10(3)/mcL 215 188 195   MCV fL 94.2* 95.8* 95.8*   MCH pg 30.3 30.2 30.6   MCHC g/dL 32.2* 31.5* 32.0*       CMP:  Recent Labs   Lab Result Units 09/22/24  0431 11/30/24  1617 12/01/24  0433   Calcium mg/dL 9.7 9.2 8.6*   Albumin g/dL 3.5 3.5 3.1*   Sodium mmol/L 142 141 143   Potassium mmol/L 4.2 3.9 4.5   CO2 mmol/L 23 18* 18*   Chloride mmol/L 107 106 112*   Blood Urea Nitrogen mg/dL 42.6* 34.5* 34.8*   Creatinine mg/dL 1.95* 2.23* 2.00*   ALP unit/L 95 91 75   ALT unit/L 24 28 26   AST unit/L 13 24 33   Bilirubin Total mg/dL 0.6 0.9 0.5       Troponin:  Recent Labs   Lab Result Units 09/22/24  0502 11/30/24  1617 11/30/24  2119 12/01/24  0433   Troponin-I ng/mL 0.100* 3.044* 2.474* 7.843*       ETOH:  Recent Labs     11/30/24  1617   ETHANOL <10.0        Urine Drug Screen:  Recent Labs     11/30/24  1757   FENTANYL Negative   MDMA Negative        Plan:   Sdh - repeat head ct today    Cc time 45 min

## 2024-12-02 PROBLEM — I21.4 NSTEMI (NON-ST ELEVATED MYOCARDIAL INFARCTION): Status: ACTIVE | Noted: 2024-01-01

## 2024-12-02 PROBLEM — R13.10 DYSPHAGIA: Status: ACTIVE | Noted: 2024-12-02

## 2024-12-02 PROBLEM — S06.9XAA CLOSED TBI (TRAUMATIC BRAIN INJURY): Status: ACTIVE | Noted: 2024-01-01

## 2024-12-02 LAB
ALBUMIN SERPL-MCNC: 3.1 G/DL (ref 3.4–4.8)
ALBUMIN/GLOB SERPL: 1 RATIO (ref 1.1–2)
ALP SERPL-CCNC: 75 UNIT/L (ref 40–150)
ALT SERPL-CCNC: 22 UNIT/L (ref 0–55)
ANION GAP SERPL CALC-SCNC: 14 MEQ/L
AST SERPL-CCNC: 35 UNIT/L (ref 5–34)
BACTERIA UR CULT: ABNORMAL
BACTERIA UR CULT: ABNORMAL
BASOPHILS # BLD AUTO: 0.03 X10(3)/MCL
BASOPHILS NFR BLD AUTO: 0.2 %
BILIRUB SERPL-MCNC: 0.9 MG/DL
BUN SERPL-MCNC: 40.7 MG/DL (ref 8.4–25.7)
CALCIUM SERPL-MCNC: 8.7 MG/DL (ref 8.8–10)
CHLORIDE SERPL-SCNC: 116 MMOL/L (ref 98–107)
CO2 SERPL-SCNC: 17 MMOL/L (ref 23–31)
CREAT SERPL-MCNC: 2.13 MG/DL (ref 0.72–1.25)
CREAT/UREA NIT SERPL: 19
CRP SERPL-MCNC: 137.3 MG/L
EOSINOPHIL # BLD AUTO: 0 X10(3)/MCL (ref 0–0.9)
EOSINOPHIL NFR BLD AUTO: 0 %
ERYTHROCYTE [DISTWIDTH] IN BLOOD BY AUTOMATED COUNT: 15.4 % (ref 11.5–17)
GFR SERPLBLD CREATININE-BSD FMLA CKD-EPI: 30 ML/MIN/1.73/M2
GLOBULIN SER-MCNC: 3.1 GM/DL (ref 2.4–3.5)
GLUCOSE SERPL-MCNC: 219 MG/DL (ref 82–115)
HCT VFR BLD AUTO: 34.8 % (ref 42–52)
HGB BLD-MCNC: 11 G/DL (ref 14–18)
IMM GRANULOCYTES # BLD AUTO: 0.09 X10(3)/MCL (ref 0–0.04)
IMM GRANULOCYTES NFR BLD AUTO: 0.6 %
LYMPHOCYTES # BLD AUTO: 0.61 X10(3)/MCL (ref 0.6–4.6)
LYMPHOCYTES NFR BLD AUTO: 4 %
MAGNESIUM SERPL-MCNC: 2.7 MG/DL (ref 1.6–2.6)
MCH RBC QN AUTO: 30.3 PG (ref 27–31)
MCHC RBC AUTO-ENTMCNC: 31.6 G/DL (ref 33–36)
MCV RBC AUTO: 95.9 FL (ref 80–94)
MONOCYTES # BLD AUTO: 1.26 X10(3)/MCL (ref 0.1–1.3)
MONOCYTES NFR BLD AUTO: 8.2 %
NEUTROPHILS # BLD AUTO: 13.37 X10(3)/MCL (ref 2.1–9.2)
NEUTROPHILS NFR BLD AUTO: 87 %
NRBC BLD AUTO-RTO: 0 %
OHS QRS DURATION: 128 MS
OHS QTC CALCULATION: 487 MS
PLATELET # BLD AUTO: 211 X10(3)/MCL (ref 130–400)
PMV BLD AUTO: 10.7 FL (ref 7.4–10.4)
POCT GLUCOSE: 235 MG/DL (ref 70–110)
POCT GLUCOSE: 287 MG/DL (ref 70–110)
POTASSIUM SERPL-SCNC: 4.7 MMOL/L (ref 3.5–5.1)
PREALB SERPL-MCNC: 13.4 MG/DL (ref 16–42)
PROT SERPL-MCNC: 6.2 GM/DL (ref 5.8–7.6)
RBC # BLD AUTO: 3.63 X10(6)/MCL (ref 4.7–6.1)
SODIUM SERPL-SCNC: 147 MMOL/L (ref 136–145)
TROPONIN I SERPL-MCNC: 12.29 NG/ML (ref 0–0.04)
WBC # BLD AUTO: 15.36 X10(3)/MCL (ref 4.5–11.5)

## 2024-12-02 PROCEDURE — 84134 ASSAY OF PREALBUMIN: CPT

## 2024-12-02 PROCEDURE — 86140 C-REACTIVE PROTEIN: CPT

## 2024-12-02 PROCEDURE — 63600175 PHARM REV CODE 636 W HCPCS

## 2024-12-02 PROCEDURE — 25000003 PHARM REV CODE 250: Performed by: INTERNAL MEDICINE

## 2024-12-02 PROCEDURE — 36415 COLL VENOUS BLD VENIPUNCTURE: CPT

## 2024-12-02 PROCEDURE — 99233 SBSQ HOSP IP/OBS HIGH 50: CPT | Mod: ,,, | Performed by: INTERNAL MEDICINE

## 2024-12-02 PROCEDURE — 83735 ASSAY OF MAGNESIUM: CPT | Performed by: NURSE PRACTITIONER

## 2024-12-02 PROCEDURE — 93005 ELECTROCARDIOGRAM TRACING: CPT

## 2024-12-02 PROCEDURE — 11000001 HC ACUTE MED/SURG PRIVATE ROOM

## 2024-12-02 PROCEDURE — 92610 EVALUATE SWALLOWING FUNCTION: CPT

## 2024-12-02 PROCEDURE — 85025 COMPLETE CBC W/AUTO DIFF WBC: CPT

## 2024-12-02 PROCEDURE — 25000003 PHARM REV CODE 250

## 2024-12-02 PROCEDURE — 97167 OT EVAL HIGH COMPLEX 60 MIN: CPT

## 2024-12-02 PROCEDURE — 97530 THERAPEUTIC ACTIVITIES: CPT

## 2024-12-02 PROCEDURE — 84484 ASSAY OF TROPONIN QUANT: CPT | Performed by: INTERNAL MEDICINE

## 2024-12-02 PROCEDURE — 25000003 PHARM REV CODE 250: Performed by: SURGERY

## 2024-12-02 PROCEDURE — 80053 COMPREHEN METABOLIC PANEL: CPT

## 2024-12-02 PROCEDURE — 63600175 PHARM REV CODE 636 W HCPCS: Performed by: SURGERY

## 2024-12-02 PROCEDURE — 99223 1ST HOSP IP/OBS HIGH 75: CPT | Mod: ,,, | Performed by: INTERNAL MEDICINE

## 2024-12-02 RX ORDER — HALOPERIDOL 5 MG/ML
2 INJECTION INTRAMUSCULAR EVERY 4 HOURS PRN
Status: DISCONTINUED | OUTPATIENT
Start: 2024-12-02 | End: 2024-12-05 | Stop reason: HOSPADM

## 2024-12-02 RX ORDER — HALOPERIDOL 5 MG/ML
5 INJECTION INTRAMUSCULAR EVERY 4 HOURS PRN
Status: DISCONTINUED | OUTPATIENT
Start: 2024-12-02 | End: 2024-12-02

## 2024-12-02 RX ORDER — MICONAZOLE NITRATE 2 G/100G
POWDER TOPICAL 2 TIMES DAILY
Status: DISCONTINUED | OUTPATIENT
Start: 2024-12-02 | End: 2024-12-05 | Stop reason: HOSPADM

## 2024-12-02 RX ORDER — EZETIMIBE 10 MG/1
10 TABLET ORAL NIGHTLY
Status: DISCONTINUED | OUTPATIENT
Start: 2024-12-02 | End: 2024-12-05 | Stop reason: HOSPADM

## 2024-12-02 RX ORDER — METOPROLOL SUCCINATE 25 MG/1
25 TABLET, EXTENDED RELEASE ORAL DAILY
Status: DISCONTINUED | OUTPATIENT
Start: 2024-12-02 | End: 2024-12-04

## 2024-12-02 RX ORDER — FUROSEMIDE 40 MG/1
40 TABLET ORAL
Status: DISCONTINUED | OUTPATIENT
Start: 2024-12-02 | End: 2024-12-03

## 2024-12-02 RX ORDER — ATORVASTATIN CALCIUM 40 MG/1
80 TABLET, FILM COATED ORAL DAILY
Status: DISCONTINUED | OUTPATIENT
Start: 2024-12-02 | End: 2024-12-05 | Stop reason: HOSPADM

## 2024-12-02 RX ADMIN — LEVETIRACETAM 500 MG: 100 INJECTION, SOLUTION INTRAVENOUS at 08:12

## 2024-12-02 RX ADMIN — MUPIROCIN: 20 OINTMENT TOPICAL at 08:12

## 2024-12-02 RX ADMIN — POLYETHYLENE GLYCOL 3350 17 G: 17 POWDER, FOR SOLUTION ORAL at 09:12

## 2024-12-02 RX ADMIN — EZETIMIBE 10 MG: 10 TABLET ORAL at 09:12

## 2024-12-02 RX ADMIN — SODIUM CHLORIDE: 9 INJECTION, SOLUTION INTRAVENOUS at 08:12

## 2024-12-02 RX ADMIN — MUPIROCIN: 20 OINTMENT TOPICAL at 09:12

## 2024-12-02 RX ADMIN — Medication: at 12:12

## 2024-12-02 RX ADMIN — LABETALOL HYDROCHLORIDE 10 MG: 5 INJECTION, SOLUTION INTRAVENOUS at 04:12

## 2024-12-02 RX ADMIN — METHOCARBAMOL 500 MG: 500 TABLET ORAL at 09:12

## 2024-12-02 RX ADMIN — INSULIN ASPART 3 UNITS: 100 INJECTION, SOLUTION INTRAVENOUS; SUBCUTANEOUS at 11:12

## 2024-12-02 RX ADMIN — FUROSEMIDE 40 MG: 40 TABLET ORAL at 11:12

## 2024-12-02 RX ADMIN — DOCUSATE SODIUM 100 MG: 100 CAPSULE, LIQUID FILLED ORAL at 11:12

## 2024-12-02 RX ADMIN — METHOCARBAMOL 500 MG: 500 TABLET ORAL at 06:12

## 2024-12-02 RX ADMIN — HYDRALAZINE HYDROCHLORIDE 10 MG: 20 INJECTION INTRAMUSCULAR; INTRAVENOUS at 06:12

## 2024-12-02 RX ADMIN — MICONAZOLE NITRATE: 20 POWDER TOPICAL at 08:12

## 2024-12-02 RX ADMIN — MICONAZOLE NITRATE: 20 POWDER TOPICAL at 12:12

## 2024-12-02 RX ADMIN — ATORVASTATIN CALCIUM 80 MG: 40 TABLET, FILM COATED ORAL at 11:12

## 2024-12-02 RX ADMIN — DOCUSATE SODIUM 100 MG: 100 CAPSULE, LIQUID FILLED ORAL at 08:12

## 2024-12-02 RX ADMIN — METOPROLOL SUCCINATE 25 MG: 25 TABLET, EXTENDED RELEASE ORAL at 11:12

## 2024-12-02 RX ADMIN — Medication: at 08:12

## 2024-12-02 RX ADMIN — INSULIN ASPART 2 UNITS: 100 INJECTION, SOLUTION INTRAVENOUS; SUBCUTANEOUS at 05:12

## 2024-12-02 NOTE — PLAN OF CARE
Daughter tells me she is stressed having just when through this with her mother. Her brother apparently wants skilled and Amrita is of the opinion pt will not be able to make any gains physically . She does want to see if [facility] will accept as she works in the ed in the same building. Her brother lives in [facility].   Referral sent and communication sent to Heri

## 2024-12-02 NOTE — SUBJECTIVE & OBJECTIVE
Interval History: Chart reviewed.  Mr Moran presented to the ER after a fall at home.  Further evaluation revealed both SDH and SAH and a troponin that is trending up.  Family has made the decision for palliative care.  And he is ready to be transferred out of the icu.  This morning the nurse states he has pulled out both of his iv lines and is now consequently in soft restraints.  He is alert and able to answer questions appropriately and follow commands.  Nurse reports he failed his bedside swallow study, and speech has been consulted.    Review of Systems  Objective:     Vital Signs (Most Recent):  Temp: 98.9 °F (37.2 °C) (12/02/24 0000)  Pulse: 105 (12/02/24 0755)  Resp: 16 (12/02/24 0755)  BP: 138/81 (12/02/24 0735)  SpO2: 99 % (12/02/24 0755) Vital Signs (24h Range):  Temp:  [98.5 °F (36.9 °C)-98.9 °F (37.2 °C)] 98.9 °F (37.2 °C)  Pulse:  [] 105  Resp:  [11-25] 16  SpO2:  [90 %-100 %] 99 %  BP: (103-158)/() 138/81     Weight: 95.3 kg (210 lb)  Body mass index is 31.93 kg/m².    Intake/Output Summary (Last 24 hours) at 12/2/2024 0817  Last data filed at 12/2/2024 0600  Gross per 24 hour   Intake 2140.22 ml   Output 1750 ml   Net 390.22 ml         Physical Exam  Constitutional:       General: He is not in acute distress.     Appearance: Normal appearance. He is obese. He is not toxic-appearing.   HENT:      Mouth/Throat:      Mouth: Mucous membranes are dry.   Cardiovascular:      Rate and Rhythm: Normal rate and regular rhythm.      Heart sounds: Murmur (HSM, II/VI) heard.   Pulmonary:      Effort: Pulmonary effort is normal.      Breath sounds: Normal breath sounds.   Abdominal:      General: Abdomen is flat. There is no distension.      Palpations: Abdomen is soft.      Tenderness: There is no abdominal tenderness.   Skin:     General: Skin is warm and dry.   Neurological:      General: No focal deficit present.      Mental Status: He is alert.      Cranial Nerves: No cranial nerve deficit.       Gait: Abnormal gait: gait not tested.      Comments: Recognizes that he is at the hospital, oriented to situation, follows commands, good  strength in both upper ext., Able to hold both legs off the bed against some resistance.   Psychiatric:         Mood and Affect: Mood normal.         Behavior: Behavior normal.             Significant Labs: All pertinent labs within the past 24 hours have been reviewed.  Recent Lab Results         12/02/24  0346   12/01/24  1715   12/01/24  1053   12/01/24  0918        Albumin/Globulin Ratio 1.0             A2C EF     45         A4C EF     35         Albumin 3.1             ALP 75             ALT 22             Anion Gap 14.0             Ao peak rolan     4.4         Ao VTI     91.5         AR Max Rolan     3.49         AST 35             AV valve area     0.6         CHARLI by Velocity Ratio     0.6         AV mean gradient     46.0         AV index (prosthetic)     0.18         AV peak gradient     77.4         AV regurgitation pressure 1/2 time     409         AV Velocity Ratio     0.20         Baso # 0.03             Basophil % 0.2             BILIRUBIN TOTAL 0.9             BSA     2.14         BUN 40.7             BUN/CREAT RATIO 19             Calcium 8.7             Chloride 116             CO2 17             Creatinine 2.13             .30             Left Ventricle Relative Wall Thickness     0.55         E/A ratio     1.10         E/E' ratio     24.77         eGFR 30             Eos # 0.00             Eos % 0.0             E wave deceleration time     169.00         FS     19.6         Globulin, Total 3.1             Glucose 219             Hematocrit 34.8             Hemoglobin 11.0             Immature Grans (Abs) 0.09             Immature Granulocytes 0.6             IVSd     1.3         LA area A2C     35.70         LA area A4C     42.60         Lactic Acid Level       1.6       LA size     4.80         LA Vol     173.00         ADALID (MOD)     82.8         LVOT  area     3.1         LV LATERAL E/E' RATIO     26.83         LV SEPTAL E/E' RATIO     23.00         LV EDV BP     126.00         LV Diastolic Volume Index     60.29         Left Ventricular End Diastolic Volume by Teichholz Method     126.00         LV EDV A2C     148         LV EDV A4C     159.00         Left Ventricular End Systolic Volume by Teichholz Method     72.90         LV ESV A2C     151.00         LV ESV A4C     190.00         LVIDd     5.1         LVIDs     4.1         LV mass     285.1         LV Mass Index     136.4         Left Ventricular Outflow Tract Mean Gradient     2.00         Left Ventricular Outflow Tract Mean Velocity     0.63         LVOT diameter     2.0         LVOT peak rolan     0.9         LVOT stroke volume     50.9         LVOT peak VTI     16.2         LV ESV BP     72.90         LV Systolic Volume Index     34.9         Lymph # 0.61             LYMPH % 4.0             Magnesium  2.70             MCH 30.3             MCHC 31.6             MCV 95.9             Mean e'     0.07         Mono # 1.26             Mono % 8.2             MPV 10.7             MV valve area by continuity eq     1.45         MV mean gradient     8         MV peak gradient     13         MV Peak A Rolan     1.46         MV Peak E Rolan     1.61         MV VTI     35.1         Neut # 13.37             Neut % 87.0             nRBC 0.0             Matthew's Biplane MOD Ejection Fraction     40         Platelet Count 211             POCT Glucose   210           Potassium 4.7             PROTEIN TOTAL 6.2             PV peak gradient     7         PV PEAK VELOCITY     1.33         PW     1.4         Est. RA pres     8         RBC 3.63             RDW 15.4             RV TB RVSP     11         Sodium 147             TAPSE     1.63         TDI SEPTAL     0.07         TDI LATERAL     0.06         Triscuspid Valve Regurgitation Peak Gradient     44         TR Max Rolan     3.3         Troponin I       12.844       TV resting  pulmonary artery pressure     52         WBC 15.36             ZLVIDD     -2.34         ZLVIDS     0.31                 Significant Imaging: I have reviewed all pertinent imaging results/findings within the past 24 hours.

## 2024-12-02 NOTE — PLAN OF CARE
Problem: Occupational Therapy  Goal: Occupational Therapy Goal  Description: goals to be met by 1/2/25    Pt will complete grooming standing at sink with LRAD with SBA.  Pt will complete UB dressing with SBA.  Pt will complete LB dressing with SBA using LRAD.  Pt will complete toileting with SBA using LRAD.  Pt will complete functional mobility to/from toilet and toilet transfer with SBA using LRAD.   Outcome: Progressing

## 2024-12-02 NOTE — PROGRESS NOTES
[facility] - [facility] Hospital Medicine  Progress Note    Patient Name: Damon Moran  MRN: [id number]  Patient Class: IP- Inpatient   Admission Date: 11/30/2024  Length of Stay: 2 days  Attending Physician: Renuka Lowery MD  Primary Care Provider: Renuka Lowery MD        Subjective:     Principal Problem:Closed TBI (traumatic brain injury)  Acute Condition: guarded        HPI:  No notes on file    Overview/Hospital Course:  No notes on file    Interval History: Chart reviewed.  Mr Moran presented to the ER after a fall at home.  Further evaluation revealed both SDH and SAH and a troponin that is trending up.  Family has made the decision for palliative care.  And he is ready to be transferred out of the icu.  This morning the nurse states he has pulled out both of his iv lines and is now consequently in soft restraints.  He is alert and able to answer questions appropriately and follow commands.  Nurse reports he failed his bedside swallow study, and speech has been consulted.    Review of Systems  Objective:     Vital Signs (Most Recent):  Temp: 98.9 °F (37.2 °C) (12/02/24 0000)  Pulse: 105 (12/02/24 0755)  Resp: 16 (12/02/24 0755)  BP: 138/81 (12/02/24 0735)  SpO2: 99 % (12/02/24 0755) Vital Signs (24h Range):  Temp:  [98.5 °F (36.9 °C)-98.9 °F (37.2 °C)] 98.9 °F (37.2 °C)  Pulse:  [] 105  Resp:  [11-25] 16  SpO2:  [90 %-100 %] 99 %  BP: (103-158)/() 138/81     Weight: 95.3 kg (210 lb)  Body mass index is 31.93 kg/m².    Intake/Output Summary (Last 24 hours) at 12/2/2024 0817  Last data filed at 12/2/2024 0600  Gross per 24 hour   Intake 2140.22 ml   Output 1750 ml   Net 390.22 ml         Physical Exam  Constitutional:       General: He is not in acute distress.     Appearance: Normal appearance. He is obese. He is not toxic-appearing.   HENT:      Mouth/Throat:      Mouth: Mucous membranes are dry.   Cardiovascular:      Rate and Rhythm: Normal rate and regular rhythm.       Heart sounds: Murmur (HSM, II/VI) heard.   Pulmonary:      Effort: Pulmonary effort is normal.      Breath sounds: Normal breath sounds.   Abdominal:      General: Abdomen is flat. There is no distension.      Palpations: Abdomen is soft.      Tenderness: There is no abdominal tenderness.   Skin:     General: Skin is warm and dry.   Neurological:      General: No focal deficit present.      Mental Status: He is alert.      Cranial Nerves: No cranial nerve deficit.      Gait: Abnormal gait: gait not tested.      Comments: Recognizes that he is at the hospital, oriented to situation, follows commands, good  strength in both upper ext., Able to hold both legs off the bed against some resistance.   Psychiatric:         Mood and Affect: Mood normal.         Behavior: Behavior normal.             Significant Labs: All pertinent labs within the past 24 hours have been reviewed.  Recent Lab Results         12/02/24  0346   12/01/24  1715   12/01/24  1053   12/01/24  0918        Albumin/Globulin Ratio 1.0             A2C EF     45         A4C EF     35         Albumin 3.1             ALP 75             ALT 22             Anion Gap 14.0             Ao peak rolan     4.4         Ao VTI     91.5         AR Max Rolan     3.49         AST 35             AV valve area     0.6         CHARLI by Velocity Ratio     0.6         AV mean gradient     46.0         AV index (prosthetic)     0.18         AV peak gradient     77.4         AV regurgitation pressure 1/2 time     409         AV Velocity Ratio     0.20         Baso # 0.03             Basophil % 0.2             BILIRUBIN TOTAL 0.9             BSA     2.14         BUN 40.7             BUN/CREAT RATIO 19             Calcium 8.7             Chloride 116             CO2 17             Creatinine 2.13             .30             Left Ventricle Relative Wall Thickness     0.55         E/A ratio     1.10         E/E' ratio     24.77         eGFR 30             Eos # 0.00              Eos % 0.0             E wave deceleration time     169.00         FS     19.6         Globulin, Total 3.1             Glucose 219             Hematocrit 34.8             Hemoglobin 11.0             Immature Grans (Abs) 0.09             Immature Granulocytes 0.6             IVSd     1.3         LA area A2C     35.70         LA area A4C     42.60         Lactic Acid Level       1.6       LA size     4.80         LA Vol     173.00         ADALID (MOD)     82.8         LVOT area     3.1         LV LATERAL E/E' RATIO     26.83         LV SEPTAL E/E' RATIO     23.00         LV EDV BP     126.00         LV Diastolic Volume Index     60.29         Left Ventricular End Diastolic Volume by Teichholz Method     126.00         LV EDV A2C     148         LV EDV A4C     159.00         Left Ventricular End Systolic Volume by Teichholz Method     72.90         LV ESV A2C     151.00         LV ESV A4C     190.00         LVIDd     5.1         LVIDs     4.1         LV mass     285.1         LV Mass Index     136.4         Left Ventricular Outflow Tract Mean Gradient     2.00         Left Ventricular Outflow Tract Mean Velocity     0.63         LVOT diameter     2.0         LVOT peak glen     0.9         LVOT stroke volume     50.9         LVOT peak VTI     16.2         LV ESV BP     72.90         LV Systolic Volume Index     34.9         Lymph # 0.61             LYMPH % 4.0             Magnesium  2.70             MCH 30.3             MCHC 31.6             MCV 95.9             Mean e'     0.07         Mono # 1.26             Mono % 8.2             MPV 10.7             MV valve area by continuity eq     1.45         MV mean gradient     8         MV peak gradient     13         MV Peak A Glen     1.46         MV Peak E Glen     1.61         MV VTI     35.1         Neut # 13.37             Neut % 87.0             nRBC 0.0             Matthew's Biplane MOD Ejection Fraction     40         Platelet Count 211             POCT Glucose   210            Potassium 4.7             PROTEIN TOTAL 6.2             PV peak gradient     7         PV PEAK VELOCITY     1.33         PW     1.4         Est. RA pres     8         RBC 3.63             RDW 15.4             RV TB RVSP     11         Sodium 147             TAPSE     1.63         TDI SEPTAL     0.07         TDI LATERAL     0.06         Triscuspid Valve Regurgitation Peak Gradient     44         TR Max Rolan     3.3         Troponin I       12.844       TV resting pulmonary artery pressure     52         WBC 15.36             ZLVIDD     -2.34         ZLVIDS     0.31                 Significant Imaging: I have reviewed all pertinent imaging results/findings within the past 24 hours.    Assessment/Plan:      * Closed TBI (traumatic brain injury)  He has SDH and SAH.  Family elected for no surgical intervention and hospice care.  But the patient is alert this morning, following commands, moving all extremities.  Will get ST/OT/PT eval and treat and also consult case management for possible hospice vs. Rehab.  Also consult for palliative care to help determine best dispo for patient.      Dysphagia  ST eval pending for diet.      NSTEMI (non-ST elevated myocardial infarction)  No cardiac intervention planned.  Family prefers palliative care.  Continue supportive care.  He is asymptomatic.      Essential (primary) hypertension  Patients blood pressure range in the last 24 hours was: BP  Min: 103/89  Max: 158/101.The patient's inpatient anti-hypertensive regimen is listed below:  Current Antihypertensives  niCARdipine 40 mg/200 mL (0.2 mg/mL) infusion, Continuous, Intravenous  hydrALAZINE injection 10 mg, Every 6 hours PRN, Intravenous  labetaloL injection 10 mg, Every 6 hours PRN, Intravenous    Plan  - BP is uncontrolled, will adjust as follows: continue prn iv until safe to swallow  - Resume home meds once cleared by st.    Type 2 diabetes mellitus with stage 3a chronic kidney disease, without long-term current use  of insulin  SSI for now.    CKD:  Monitor.  VTE Risk Mitigation (From admission, onward)           Ordered     IP VTE HIGH RISK PATIENT  Once         11/30/24 1712     Reason for No Pharmacological VTE Prophylaxis  Once        Comments: Traumatic Injuries   Question:  Reasons:  Answer:  Active Bleeding  Comment:  SDH    11/30/24 1712     Place sequential compression device  Until discontinued         11/30/24 1712                    Discharge Planning   KURTIS:      Code Status: DNR   Is the patient medically ready for discharge?:     Reason for patient still in hospital (select all that apply): Patient trending condition                     Renuka Lowery MD  Department of Hospital Medicine   [facility] - [facility]

## 2024-12-02 NOTE — ASSESSMENT & PLAN NOTE
Patients blood pressure range in the last 24 hours was: BP  Min: 103/89  Max: 158/101.The patient's inpatient anti-hypertensive regimen is listed below:  Current Antihypertensives  niCARdipine 40 mg/200 mL (0.2 mg/mL) infusion, Continuous, Intravenous  hydrALAZINE injection 10 mg, Every 6 hours PRN, Intravenous  labetaloL injection 10 mg, Every 6 hours PRN, Intravenous    Plan  - BP is uncontrolled, will adjust as follows: continue prn iv until safe to swallow  - Resume home meds once cleared by

## 2024-12-02 NOTE — PLAN OF CARE
Zahra with hospice of [facility] updates me that she met with pt, daughter and son Michael was on the phone. Both children verbalize desire to see if he can get stronger before returning home or entering hospice care as there would be no therapy then.   I will give Amrita list of skilled facilities to select from . Therapy notes yesterday did list moderate  level  rehab  Zahra will keep in touch with family for when pt is done with skilled and if they would like to review for hospice at that time she will be available to them

## 2024-12-02 NOTE — PT/OT/SLP EVAL
Occupational Therapy  Evaluation    Name: Damon Moran  MRN: [id number]      Recommendations:     Discharge therapy intensity: Moderate Intensity Therapy   Discharge Equipment Recommendations:  wheelchair, walker, rolling  Barriers to discharge:  Decreased caregiver support (ongoing medical needs, severity of deficits)    Assessment:     Damon Moran is a 84 y.o. male with a medical diagnosis of fall off of rollator resulting in: SDH, SAH, NSTEMI, elevated troponin, occipital bone fx. He presents with the following performance deficits affecting function: weakness, impaired self care skills, impaired functional mobility, gait instability, impaired balance. Patient pleasant during OT eval, difficulty with some commands however >75% of commands followed. Very eager to get out of bed. Patient required SBA for bed mobility, min A for sit <> stand, and mod A for step transfer to the chair. Attempting to perform anterior steps in which he lost his balance anteriorly therefore deferred further ambulation at this time. Patient required max A to don socks seated EOB, difficulty with performing figure 4 at this time. Patient had sitters 3 days/wk for 4 hours, otherwise was alone until daughter would occasionally check in on him.  Prolonged conversation held with daughter in regards to discharge recommendations. Patient would benefit from moderate intensity therapy to maximize functional independence; however if home with hospice is the route that they would like to pursue, then the patient will need a wheelchair and 24/7 assist. During the conversation, the daughter reports that the rollator was damaged, therefore will also need a rolling walker. Spoke with CM about all listed above.    Rehab Prognosis: Good; patient would benefit from acute skilled OT services to address these deficits and reach maximum level of function.       Plan:     Patient to be seen 5 x/week to address the above listed problems via self-care/home  management, therapeutic activities, therapeutic exercises  Plan of Care Expires: 01/02/25  Plan of Care Reviewed with: patient, daughter    Subjective     Chief Complaint: none stated  Patient/Family Comments/goals: to go home    Occupational Profile:  Living Environment: lives alone in [facility], no CHRISTAL; walk in tub  Previous level of function: able to ambulate with rollator ~50 ft; independent with all ADLs except bathing. Required assist for IADLs  Roles and Routines: father  Equipment Used at Home: rollator  Assistance upon Discharge: daughter pursuing 24/7 sitters    Pain/Comfort:  Pain Rating 1: 0/10    Patients cultural, spiritual, Latter day conflicts given the current situation: no    Objective:     OT communicated with NSG prior to session.      Patient was found HOB elevated with blood pressure cuff, peripheral IV, pulse ox (continuous), telemetry, PureWick (B mitts) upon OT entry to room.    General Precautions: Standard, fall (SBP <140)  Orthopedic Precautions: N/A  Braces: N/A  Room air  Vital Signs: 120/90 92% 101 bpm    Bed Mobility:    Patient completed Supine to Sit with stand by assistance    Functional Mobility/Transfers:  Patient completed Sit <> Stand Transfer with minimum assistance  with  hand-held assist   Patient completed Bed <> Chair Transfer using Step Transfer technique with moderate assistance with hand-held assist  Functional Mobility: significant anterior LOB when attempting anterior steps, transferred to the chair for safety    Activities of Daily Living:  Lower Body Dressing: maximal assistance to don socks seated EOB    AMPAC 6 Click ADL:  AMPAC Total Score: 16    Functional Cognition:  Orientation: oriented to Person, Place, and Time  Command Following: Follows simple one-step commands with >75% accuracy  Appears to wax and wane with confusion    Visual Perceptual Skills:  Tracking intact    Upper Extremity Function:  Right Upper Extremity:   Range of Motion: WFL    Left Upper  Extremity:  Range of Motion: WFL      Therapeutic Positioning  Risk for acquired pressure injuries is increased due to relative decrease in mobility d/t hospitalization  and impaired mobility.    OT interventions performed during the course of today's session:   Education was provided on benefits of and recommendations for therapeutic positioning  Therapeutic positioning was provided at the conclusion of session to offload all bony prominences for the prevention and/or reduction of pressure injuries    Skin assessment: all bony prominences were assessed    Findings: no redness or breakdown noted    OT recommendations for therapeutic positioning throughout hospitalization:   Follow [facility] Pressure Injury Prevention Protocol  Geomat recommended for sacral protection while [facility]    Patient Education:  Patient and daughter/s were provided with verbal education education regarding OT role/goals/POC, fall prevention, safety awareness, Discharge/DME recommendations, and pressure ulcer prevention.  Understanding was verbalized, however additional teaching warranted.     Patient left up in chair with all lines intact, call button in reach, chair alarm on, NSG notified, and posey vest/B mitts .    GOALS:   Multidisciplinary Problems       Occupational Therapy Goals          Problem: Occupational Therapy    Goal Priority Disciplines Outcome Interventions   Occupational Therapy Goal     OT, PT/OT Progressing    Description: goals to be met by 1/2/25    Pt will complete grooming standing at sink with LRAD with SBA.  Pt will complete UB dressing with SBA.  Pt will complete LB dressing with SBA using LRAD.  Pt will complete toileting with SBA using LRAD.  Pt will complete functional mobility to/from toilet and toilet transfer with SBA using LRAD.                        History:     Past Medical History:   Diagnosis Date    Atrial paroxysmal tachycardia     Chronic kidney disease, stage 3     Coronary arteriosclerosis     Diastolic  dysfunction     HTN (hypertension)     Iron deficiency anemia, unspecified     Obstructive sleep apnea syndrome     Prostate cancer     Pure hypercholesterolemia     Type 2 diabetes mellitus without complications          Past Surgical History:   Procedure Laterality Date    CBP      CORONARY ARTERY BYPASS GRAFT (CABG)      x 3    Repair of finger laceration      resection of atypical mole         Time Tracking:     OT Date of Treatment:    OT Start Time: 1320  OT Stop Time: 1353  OT Total Time (min): 33 min    Billable Minutes:Evaluation high  Therapeutic Activity 2    12/2/2024

## 2024-12-02 NOTE — PLAN OF CARE
Problem: Adult Inpatient Plan of Care  Goal: Plan of Care Review  Outcome: Progressing  Goal: Patient-Specific Goal (Individualized)  Outcome: Progressing  Goal: Absence of Hospital-Acquired Illness or Injury  Outcome: Progressing  Goal: Optimal Comfort and Wellbeing  Outcome: Progressing  Goal: Readiness for Transition of Care  Outcome: Progressing     Problem: Diabetes Comorbidity  Goal: Blood Glucose Level Within Targeted Range  Outcome: Progressing     Problem: Skin Injury Risk Increased  Goal: Skin Health and Integrity  Outcome: Progressing     Problem: Fall Injury Risk  Goal: Absence of Fall and Fall-Related Injury  Outcome: Progressing     Problem: Coping Ineffective  Goal: Effective Coping  Outcome: Progressing     Problem: Wound  Goal: Optimal Coping  Outcome: Progressing  Goal: Optimal Functional Ability  Outcome: Progressing  Goal: Absence of Infection Signs and Symptoms  Outcome: Progressing  Goal: Improved Oral Intake  Outcome: Progressing  Goal: Optimal Pain Control and Function  Outcome: Progressing  Goal: Skin Health and Integrity  Outcome: Progressing  Goal: Optimal Wound Healing  Outcome: Progressing

## 2024-12-02 NOTE — PLAN OF CARE
Fell out of wheelchair at home, TBI, had medical alert button that notified EMS  Daughter Tommy Moran at bedside [phone].   Medicare ,MEDICARE SUPPLEMENT [facility] [employer]   PCP Beverley  Order for hospice, spoke with daughter who wants referral to [facility], this sent, office notified and voice message left for Rocio.   Daughter confirms she will not be the primary care giver and needs sitter lists. She has talked with a couple agencies and found their prices high.   Anticipate update from [facility] once they speak with pt and daughter    List of sitter services given to daughter. Therapy is at bedside and updates  that pt will need a wheelchair. Daughter clairified that pt does not have a wheelchair, he fell from his rollator.   Alyson with [facility] will meet with daughter and pt at 2:45-3:00 today.

## 2024-12-02 NOTE — ASSESSMENT & PLAN NOTE
He has SDH and SAH.  Family elected for no surgical intervention and hospice care.  But the patient is alert this morning, following commands, moving all extremities.  Will get ST/OT/PT eval and treat and also consult case management for possible hospice vs. Rehab.

## 2024-12-02 NOTE — PROGRESS NOTES
[facility] *    Cardiology  Progress Note    Patient Name: Damon Moran  MRN: [id number]  Admission Date: 11/30/2024  Hospital Length of Stay: 2 days  Code Status: DNR   Attending Provider: Renuka Lowery MD   Consulting Provider: GRADY Wesley  Primary Care Physician: Renuka Lowery MD  Principal Problem:<principal problem not specified>    Patient information was obtained from patient, past medical records, and ER records.     Subjective:     Chief Complaint/Reason for Consult: NSTEMI     HPI: Mr. Moran is an 83 y/o male who is unknown to [facility]. The patient presented to [facility] on 11.30.24 via Transfer for a Fall from his WC with note L2 Trauma. The patient allegedly fell backwards in his wheel chair. EMS was notified by a sensor the PT wears. Upon arrival a CODE FAST was called as the PT had some Aphasia and was not following commands. Initial Workup revealed Acute Traumatic Subdural Hematomas, Subarachnoid Hemorrhages with Linear Non-Displaced Fractures in the Occipital Bone. He was admitted to the Trauma ICU and Neurosurgery was consulted for Recommendations. The patient was made a DNR by his Family and Neurosurgery signed off. The patient had an elevated Troponin that has not peaked. [facility] has been consulted for recommendations.     12.2.24: NAD. WBC 15.36, BUN/Crea 40.7/2.13, K 4.7, Mg 2.70. Denies SOB, CP and Palps. RA    PMH: Atrial Tachycardia, CKD Stage III, HTN, EHSAN, RUPA/CPAP, Prostate Cancer, HLD, DM II, CVA  PSH: CBP, CABG, Angiogram, Finger Laceration Repair, Mole Removal  Family History: Mother, D, Sudden Death, Cancer; Father, D, Cancer  Social History: Denies Illicit Drug, ETOH and Tobacco Use     Previous Cardiac Diagnostics:   ECHO 12.2.24:  Left Ventricle: The left ventricle is normal in size. Mildly increased wall thickness. There is mildly reduced systolic function with a visually estimated ejection fraction of 45 - 50%. Grade I diastolic dysfunction.  Right Ventricle: Normal  right ventricular cavity size. Systolic function is reduced.TAPSE is 1.63 cm.  Left Atrium: Left atrium is severely dilated.  Aortic Valve: There is severe aortic valve sclerosis. Severely restricted motion. There is severe stenosis. Aortic valve area by VTI is 0.6 cm². Aortic valve peak velocity is 4.4 m/s. Mean gradient is 46.0 mmHg. The dimensionless index is 0.18. There is moderate aortic regurgitation.  Mitral Valve: There is moderate mitral annular calcification present. There is moderate stenosis. The mean pressure gradient across the mitral valve is 8 mmHg at a heart rate of  bpm. There is mild regurgitation.  Tricuspid Valve: There is moderate regurgitation.  Pulmonic Valve: There is mild regurgitation.  Pulmonary Artery: There is moderate pulmonary hypertension. The estimated pulmonary artery systolic pressure is 52 mmHg.  IVC/SVC: Intermediate venous pressure at 8 mmHg.  Pericardium: There is no pericardial effusion.    Review of patient's allergies indicates:   Allergen Reactions    Ramipril      tinnitus    Rosiglitazone      Dyspnea      Rosuvastatin      No current facility-administered medications on file prior to encounter.     Current Outpatient Medications on File Prior to Encounter   Medication Sig    allopurinoL (ZYLOPRIM) 300 MG tablet Take 0.5 tablets (150 mg total) by mouth once daily. (Patient taking differently: Take 300 mg by mouth once daily.)    aspirin 81 MG Chew Take 81 mg by mouth once daily.    atorvastatin (LIPITOR) 80 MG tablet Take 1 tablet (80 mg total) by mouth once daily.    empagliflozin (JARDIANCE) 25 mg tablet Take 1 tablet (25 mg total) by mouth once daily.    ezetimibe (ZETIA) 10 mg tablet Take 1 tablet (10 mg total) by mouth once daily.    furosemide (LASIX) 40 MG tablet Take 40 mg by mouth every Mon, Wed, Fri.    glimepiride (AMARYL) 4 MG tablet Take 0.5 tablets (2 mg total) by mouth before breakfast.    isosorbide mononitrate (IMDUR) 30 MG 24 hr tablet Take 15 mg by  mouth once daily.    Lactobacillus acidophilus 500 million cell Cap Take 1 capsule by mouth nightly.    linaGLIPtin (TRADJENTA) 5 mg Tab tablet Take 1 tablet (5 mg total) by mouth once daily.    metoprolol succinate (TOPROL-XL) 50 MG 24 hr tablet Take 25 mg by mouth once daily.    blood sugar diagnostic Strp 1 strip by Misc.(Non-Drug; Combo Route) route 2 (two) times daily with meals.    blood-glucose meter kit Use as instructed (Patient taking differently: 1 each by Other route once daily. Use as instructed)    docusate sodium (COLACE) 50 MG capsule Take 1 capsule (50 mg total) by mouth 2 (two) times daily.    lancets Misc 1 lancet by Misc.(Non-Drug; Combo Route) route 2 (two) times daily with meals.    lancing device Misc 1 Device by Misc.(Non-Drug; Combo Route) route 2 (two) times daily with meals.     Review of Systems   Constitutional:  Positive for fatigue.   Cardiovascular:  Negative for palpitations.   Musculoskeletal:  Negative for neck pain.   Neurological:  Positive for weakness. Negative for speech difficulty.   All other systems reviewed and are negative.    Objective:     Vital Signs (Most Recent):  Temp: 98.9 °F (37.2 °C) (12/02/24 0000)  Pulse: 96 (12/02/24 0600)  Resp: 20 (12/02/24 0600)  BP: (!) 158/101 (12/02/24 0600)  SpO2: 100 % (12/02/24 0600) Vital Signs (24h Range):  Temp:  [98.5 °F (36.9 °C)-98.9 °F (37.2 °C)] 98.9 °F (37.2 °C)  Pulse:  [] 96  Resp:  [11-25] 20  SpO2:  [90 %-100 %] 100 %  BP: (103-158)/() 158/101   Weight: 95.3 kg (210 lb)  Body mass index is 31.93 kg/m².  SpO2: 100 %       Intake/Output Summary (Last 24 hours) at 12/2/2024 0721  Last data filed at 12/2/2024 0600  Gross per 24 hour   Intake 2140.22 ml   Output 1750 ml   Net 390.22 ml     Lines/Drains/Airways       Drain  Duration             Male External Urinary Catheter 11/30/24 1900 Small 1 day              Peripheral Intravenous Line  Duration                  Peripheral IV - Single Lumen 12/02/24 0200 20 G  Anterior;Proximal;Right Forearm <1 day                  Significant Labs:   Chemistries:   Recent Labs   Lab 11/30/24  1617 11/30/24  2119 12/01/24  0433 12/01/24  0918 12/02/24  0346     --  143  --  147*   K 3.9  --  4.5  --  4.7     --  112*  --  116*   CO2 18*  --  18*  --  17*   BUN 34.5*  --  34.8*  --  40.7*   CREATININE 2.23*  --  2.00*  --  2.13*   CALCIUM 9.2  --  8.6*  --  8.7*   BILITOT 0.9  --  0.5  --  0.9   ALKPHOS 91  --  75  --  75   ALT 28  --  26  --  22   AST 24  --  33  --  35*   GLUCOSE 221*  --  230*  --  219*   MG  --   --   --   --  2.70*   TROPONINI 3.044* 2.474* 7.843* 12.844*  --         CBC/Anemia Labs: Coags:    Recent Labs   Lab 11/30/24  1617 12/01/24 0433 12/02/24  0346   WBC 17.46  17.46* 14.21* 15.36*   HGB 12.3* 10.9* 11.0*   HCT 39.0* 34.1* 34.8*    195 211   MCV 95.8* 95.8* 95.9*   RDW 15.0 15.3 15.4    Recent Labs   Lab 11/30/24  1618   INR 1.1   APTT 27.8        EKG:      Telemetry: SR with PVCs    Physical Exam  Constitutional:       General: He is not in acute distress.     Appearance: Normal appearance. He is obese. He is ill-appearing.   HENT:      Head: Normocephalic.      Mouth/Throat:      Mouth: Mucous membranes are moist.   Cardiovascular:      Rate and Rhythm: Normal rate and regular rhythm.      Pulses: Normal pulses.      Heart sounds: Normal heart sounds.   Pulmonary:      Effort: Pulmonary effort is normal. No respiratory distress.      Breath sounds: Normal breath sounds.      Comments: RA  Abdominal:      Palpations: Abdomen is soft.   Skin:     General: Skin is warm and dry.   Neurological:      Mental Status: He is alert and oriented to person, place, and time. Mental status is at baseline.      Comments: Moves RUE Spontaneously   Psychiatric:         Mood and Affect: Mood normal.         Behavior: Behavior normal.       Home Medications:   No current facility-administered medications on file prior to encounter.     Current Outpatient  Medications on File Prior to Encounter   Medication Sig Dispense Refill    allopurinoL (ZYLOPRIM) 300 MG tablet Take 0.5 tablets (150 mg total) by mouth once daily. (Patient taking differently: Take 300 mg by mouth once daily.) 90 tablet 3    aspirin 81 MG Chew Take 81 mg by mouth once daily.      atorvastatin (LIPITOR) 80 MG tablet Take 1 tablet (80 mg total) by mouth once daily. 90 tablet 3    empagliflozin (JARDIANCE) 25 mg tablet Take 1 tablet (25 mg total) by mouth once daily. 90 tablet 3    ezetimibe (ZETIA) 10 mg tablet Take 1 tablet (10 mg total) by mouth once daily. 90 tablet 3    furosemide (LASIX) 40 MG tablet Take 40 mg by mouth every Mon, Wed, Fri.      glimepiride (AMARYL) 4 MG tablet Take 0.5 tablets (2 mg total) by mouth before breakfast.      isosorbide mononitrate (IMDUR) 30 MG 24 hr tablet Take 15 mg by mouth once daily.      Lactobacillus acidophilus 500 million cell Cap Take 1 capsule by mouth nightly. 30 capsule 3    linaGLIPtin (TRADJENTA) 5 mg Tab tablet Take 1 tablet (5 mg total) by mouth once daily. 90 tablet 3    metoprolol succinate (TOPROL-XL) 50 MG 24 hr tablet Take 25 mg by mouth once daily.      blood sugar diagnostic Strp 1 strip by Misc.(Non-Drug; Combo Route) route 2 (two) times daily with meals. 100 strip 11    blood-glucose meter kit Use as instructed (Patient taking differently: 1 each by Other route once daily. Use as instructed) 1 each 0    docusate sodium (COLACE) 50 MG capsule Take 1 capsule (50 mg total) by mouth 2 (two) times daily. 60 capsule 3    lancets Misc 1 lancet by Misc.(Non-Drug; Combo Route) route 2 (two) times daily with meals. 100 each 11    lancing device Misc 1 Device by Misc.(Non-Drug; Combo Route) route 2 (two) times daily with meals. 1 each 0     Current Schedule Inpatient Medications:   acetaminophen  650 mg Oral Q4H    docusate sodium  100 mg Oral BID    levETIRAcetam (Keppra) IV (PEDS and ADULTS)  500 mg Intravenous Q12H    methocarbamoL  500 mg Oral  Q8H    mupirocin   Nasal BID    polyethylene glycol  17 g Oral BID     Continuous Infusions:   0.9% NaCl   Intravenous Continuous 75 mL/hr at 12/01/24 1627 Rate Verify at 12/01/24 1627    nicardipine  0-15 mg/hr Intravenous Continuous   Stopped at 11/30/24 1838     Assessment:   Traumatic Fall from WC Striking Head resulting in Acute Traumatic Subdural Hematomas, Subarachnoid Hemorrhages with Linear Non-Displaced Fractures in the Occipital Bone  NSTEMI - Unspecified    - Troponin Not Peaked - (> 12)  Leukocytosis   HTN  EHSAN  NOE/CKD Stage III  DM II  Hx of CVA  Hx of Atrial Tachycardia  Hx of Prostate Cancer  RUPA/CPAP  HLD    Plan:   ECHO Reviewed   Unable to Treat NSTEMI or Perform an Ischemic Evaluation given Neurological Findings  Discussion with Family and they wish to elect for Palliative Care  We will keep them in our thoughts and prayers  We will be available as needed    GRADY Wesley  Cardiology  [facility]     I agree with the findings of the complexity of problems addressed and take responsibility for the plan's risks and complications. I approved the plan documented by Arjun Vargas NP.

## 2024-12-02 NOTE — PT/OT/SLP EVAL
[facility]  Speech Language Pathology Department  Clinical Swallow Evaluation    Patient Name:  Damon Moran   MRN:  [id number]    Recommendations     General recommendations:  Speech/Language and Cognitive Evaluation  Solid texture recommendation:  Regular  Liquid consistency recommendation: Thin   Medications: per patient preference  Swallow strategies/precautions: small bites/sips, slow rate, supervision with meals, and assist with feeding as needed  Precautions: aspiration    History     Damon Moran is a/n 84 y.o. male admitted as a level 2 trauma following a fall from his wheelchair.  Injuries include a SDH and cardiac contusion.  A code FAST was called in the ED and a CVA workup was initiated.  Pt failed nursing swallow screen.    Past Medical History:   Diagnosis Date    Atrial paroxysmal tachycardia     Chronic kidney disease, stage 3     Coronary arteriosclerosis     Diastolic dysfunction     HTN (hypertension)     Iron deficiency anemia, unspecified     Obstructive sleep apnea syndrome     Prostate cancer     Pure hypercholesterolemia     Type 2 diabetes mellitus without complications      Past Surgical History:   Procedure Laterality Date    CBP      CORONARY ARTERY BYPASS GRAFT (CABG)      x 3    Repair of finger laceration      resection of atypical mole       Home diet texture/consistency: Regular and thin liquids  Current method of nutrition: NPO    Imaging   Results for orders placed during the hospital encounter of 11/30/24    X-Ray Chest 1 View    Narrative  EXAMINATION:  XR CHEST 1 VIEW    CLINICAL HISTORY:  r/o bleeding or hemorrhage;    TECHNIQUE:  Frontal view(s) of the chest.    COMPARISON:  Radiography 08/12/2016    FINDINGS:  Previous sternotomy.  Cardiac silhouette upper normal in size.  The lungs are well-inflated.  Mild perihilar vascular prominence.  No defined consolidation.  No significant pleural effusion or discernible pneumothorax.    Impression  Mild  perihilar vascular prominence.      Electronically signed by: Michael Sweet  Date:    11/30/2024  Time:    16:27    Subjective     Patient awake, alert, and cooperative.    Patient goals: to eat/drink   Spiritual/Cultural/Confucianism Beliefs/Practices that affect care: no    Pain/Comfort: Pain Rating 1: 0/10    Respiratory Status:  room air    Restraints/positioning devices: wrist restraints, removed for the session, and soft mittens placed following evaluation per nursing    Objective     ORAL MUSCULATURE  Dentition: missing teeth and teeth in poor condition  Secretion Management: adequate  Mucosal Quality: good  Facial Movement: WFL  Buccal Strength & Mobility: WFL  Mandibular Strength & Mobility: WFL  Oral Labial Strength & Mobility: WFL  Lingual Strength & Mobility: WFL  Vocal Quality: adequate    PO TRIALS  Consistency Fed By Oral Symptoms Pharyngeal Symptoms   Thin liquid by cup Self None None   Puree SLP None None   Chewable solid Self None None   Thin liquid by straw SLP None None     Assessment     Oropharyngeal swallow WFL.  No overt/clinical signs/sx aspiration noted.    Outcome Measures     Functional Oral Intake Scale: 7 - Total oral diet with no restrictions    Education     Patient and daughter/s were provided with verbal education regarding SLP POC.  Understanding was verbalized.    Plan     SLP Follow-Up:  Yes   Plan of Care reviewed with:  patient, daughter     Time Tracking     SLP Treatment Date:   12/02/24  Speech Start Time:  0935  Speech Stop Time:  0950     Speech Total Time (min):  15 min    Billable minutes:  Swallow and Oral Function Evaluation, 15 minutes     12/02/2024

## 2024-12-02 NOTE — ASSESSMENT & PLAN NOTE
No cardiac intervention planned.  Family prefers palliative care.  Continue supportive care.  He is asymptomatic.

## 2024-12-02 NOTE — CONSULTS
Patient Name: Damon Moran   MRN: [id number]   Admission Date: 11/30/2024   Hospital Length of Stay: 2   Attending Provider: Renuka Lowery MD   Consulting Provider: Ralph Otero M.D.  Reason for Consult: Goals of Care  Primary Care Physician: Renuka Lowery MD     Principal Problem: Closed TBI (traumatic brain injury)     Patient information was obtained from patient, relative(s), and ER records.      Final diagnoses:  [R09.89] Suspected cerebrovascular accident (CVA)  [S06.5X0A] Traumatic subdural hematoma without loss of consciousness, initial encounter (Primary)  [I21.4] NSTEMI (non-ST elevated myocardial infarction)  [N17.9, N18.9] Acute kidney injury superimposed on chronic kidney disease  [Z66] DNR (do not resuscitate)       We reviewed the patient's current clinical status with the nurse. We reviewed clinical documentation, labs and imaging.       Advance Care Planning     Date: 12/02/2024    Code Status  In light of the patients advanced and life limiting illness,I engaged the the patient, family, and healthcare power of   in a voluntary conversation about the patient's preferences for care  at the very end of life. The patient wishes to have a natural, peaceful death.  Along those lines, the patient does not wish to have CPR or other invasive treatments performed when his heart and/or breathing stops. I communicated to the patient, family, and healthcare power of   that a DNR order would be placed in his medical record to reflect this preference.    A total of 26 min was spent on advance care planning, goals of care discussion, emotional support, formulating and communicating prognosis and exploring burden/benefit of various approaches of treatment. This discussion occurred on a fully voluntary basis with the verbal consent of the patient and/or family.     [facility]  I engaged the patient, family, and healthcare power of   in a voluntary conversation about advance care planning  and we specifically addressed what the goals of care would be moving forward, in light of the patient's change in clinical status, specifically altered mentation.  We did specifically address the patient's likely prognosis, which is poor.  We explored the patient's values and preferences for future care.  The patient, family, and healthcare power of   endorses that what is most important right now is to focus on comfort and QOL     Accordingly, we have decided that the best plan to meet the patient's goals includes no further escalation in treatment, enrolling in hospice care, and pivot to comfort-focused care    A total of 27 min was spent on advance care planning, goals of care discussion, emotional support, formulating and communicating prognosis and exploring burden/benefit of various approaches of treatment. This discussion occurred on a fully voluntary basis with the verbal consent of the patient and/or family.     Hospice  I did explain the role for hospice care at this stage of the patient's illness, including its ability to help the patient live with the best quality of life possible.     Artifical nutrition:  I reviewed the patient's wishes concerning the option for or against a future placement of a PEG for the purpose of long term artificial nutrition. I reviewed the benefits and risks. At this time the patient and POA elects against PEG placement in such an event.    Symptom review:  Mild back pain    Assessment and Plan:    Recurrent falls with bifrontal SDH  significant dementia    History of Present Illness:   84 year old male with a hx of CKD, HTN, DM, and prostate cancer presents to the ED via EMS following a fall . Further evaluation revealed both SDH and SAH and a troponin that is trending up. Family has made the decision for palliative care. And he is ready to be transferred out of the icu.  Consulted palliative Medicine for discussion of goals of care.  Patient is oriented to time place  and person but confused.  Discussed with patient, daughter (healthcare POA) at bedside along with son on the phone call.  After thorough discussion and review about the patient's current health care condition, and after explaining the risks and benefits of various codes.  Patient and family chose DNR/DNI, denied any invasive surgeries including Neurosurgery, denied any artificial nutrition including  PEG tube.  Discussed about hospice, SNF and rehab.  Patient lives all by himself in [place], daughter takes care during the day intermittently.  Family opted for the hospice care. At this stage patient requires a sitter/needs a rehab to make patient to be at baseline, currently patient working with  for the placement.  Of note, daughter states patient has a living will which mentions patient do not want to pursue nursing home.    Active Ambulatory Problems     Diagnosis Date Noted    Type 2 diabetes mellitus with stage 3a chronic kidney disease, without long-term current use of insulin 06/16/2022    Severe obesity (BMI 35.0-39.9) with comorbidity 06/16/2022    Essential (primary) hypertension 06/16/2022    Hyperlipidemia 06/16/2022    Coronary artery disease 06/16/2022    Male hypogonadism 06/16/2022    LVH (left ventricular hypertrophy) due to hypertensive disease, without heart failure 06/16/2022    Diastolic dysfunction 06/16/2022    Prostate cancer 06/16/2022    Iron deficiency anemia 06/16/2022    History of kidney stones 06/16/2022    Sleep apnea 06/16/2022    OA (osteoarthritis) of knee 06/16/2022    Tinnitus 06/16/2022    Sciatica 06/16/2022    Stage 3b chronic kidney disease 08/11/2022    Hematochezia 01/09/2023    Fall 01/19/2023    Shoulder pain 01/19/2023    Hyperparathyroidism, unspecified 08/09/2023    Chronic diastolic heart failure 08/09/2023    Anemia 03/13/2024    Mild cognitive impairment 03/13/2024    Aortic atherosclerosis 03/13/2024    Valvular heart disease 08/07/2024     Resolved  Ambulatory Problems     Diagnosis Date Noted    Atrial paroxysmal tachycardia 06/16/2022    Encounter for Medicare annual wellness exam 08/11/2022    Lower GI bleed 01/08/2023    Atrial fibrillation 03/13/2024    Rectal bleeding 03/13/2024    Stage 3b chronic kidney disease 03/13/2024     Past Medical History:   Diagnosis Date    Chronic kidney disease, stage 3     Coronary arteriosclerosis     HTN (hypertension)     Iron deficiency anemia, unspecified     Obstructive sleep apnea syndrome     Pure hypercholesterolemia     Type 2 diabetes mellitus without complications         Past Surgical History:   Procedure Laterality Date    CBP      CORONARY ARTERY BYPASS GRAFT (CABG)      x 3    Repair of finger laceration      resection of atypical mole          Review of patient's allergies indicates:   Allergen Reactions    Ramipril      tinnitus    Rosiglitazone      Dyspnea      Rosuvastatin           Current Facility-Administered Medications:     0.9%  NaCl infusion (for blood administration), , Intravenous, Q24H PRN, Solange Montilla MD    0.9% NaCl infusion, , Intravenous, Continuous, Renuka Lowery MD, Last Rate: 75 mL/hr at 12/02/24 0852, New Bag at 12/02/24 0852    acetaminophen tablet 650 mg, 650 mg, Oral, Q4H, Jered Benitez MD    atorvastatin tablet 80 mg, 80 mg, Oral, Daily, Renuka Lowery MD, 80 mg at 12/02/24 1127    bisacodyL suppository 10 mg, 10 mg, Rectal, Daily PRN, Jered Benitez MD    dextrose 10% bolus 125 mL 125 mL, 12.5 g, Intravenous, PRN, Joey Pino MD    dextrose 10% bolus 250 mL 250 mL, 25 g, Intravenous, PRN, Joey Pino MD    docusate sodium capsule 100 mg, 100 mg, Oral, BID, Jered Benitez MD, 100 mg at 12/02/24 1126    ezetimibe tablet 10 mg, 10 mg, Oral, QHS, Renuka Lowery MD    furosemide tablet 40 mg, 40 mg, Oral, Every Mon, Wed, Fri, Renuka Lowery MD, 40 mg at 12/02/24 1127    glucagon (human recombinant) injection 1 mg, 1 mg, Intramuscular, PRN, Alvarez  MD Joey    hydrALAZINE injection 10 mg, 10 mg, Intravenous, Q6H PRN, Jony Soares MD, 10 mg at 12/02/24 0601    insulin aspart U-100 injection 0-5 Units, 0-5 Units, Subcutaneous, Q6H PRN, Joey Pino MD, 3 Units at 12/02/24 1125    labetaloL injection 10 mg, 10 mg, Intravenous, Q6H PRN, Jony Soares MD, 10 mg at 12/02/24 0405    levETIRAcetam (Keppra) 500 mg in D5W 100 mL IVPB (MB+), 500 mg, Intravenous, Q12H, Joey Pino MD, Stopped at 12/02/24 0923    melatonin tablet 6 mg, 6 mg, Oral, Nightly PRN, Jered Benitez MD    methocarbamoL tablet 500 mg, 500 mg, Oral, Q8H, Jered Benitez MD, 500 mg at 12/02/24 0600    metoprolol succinate (TOPROL-XL) 24 hr tablet 25 mg, 25 mg, Oral, Daily, Renuka Lowery MD, 25 mg at 12/02/24 1127    miconazole NITRATE 2 % top powder, , Topical (Top), BID, Renuka Lowery MD, Given at 12/02/24 1245    morphine injection 2 mg, 2 mg, Intravenous, Q2H PRN, Jered Benitez MD, 2 mg at 12/01/24 1726    mupirocin 2 % ointment, , Nasal, BID, Joey Pino MD, Given at 12/02/24 0900    niCARdipine 40 mg/200 mL (0.2 mg/mL) infusion, 0-15 mg/hr, Intravenous, Continuous, Solange Montilla MD, Stopped at 11/30/24 1838    oxyCODONE immediate release tablet 5 mg, 5 mg, Oral, Q4H PRN, Jered Benitez MD    polyethylene glycol packet 17 g, 17 g, Oral, BID, Jered Benitez MD, 17 g at 12/02/24 0900    zinc oxide-cod liver oil 40 % paste, , Topical (Top), BID, Renuka Lowery MD, Given at 12/02/24 1200       Current Facility-Administered Medications:     0.9%  NaCl infusion (for blood administration), , Intravenous, Q24H PRN    bisacodyL, 10 mg, Rectal, Daily PRN    dextrose 10%, 12.5 g, Intravenous, PRN    dextrose 10%, 25 g, Intravenous, PRN    glucagon (human recombinant), 1 mg, Intramuscular, PRN    hydrALAZINE, 10 mg, Intravenous, Q6H PRN    insulin aspart U-100, 0-5 Units, Subcutaneous, Q6H PRN    labetaloL, 10 mg, Intravenous, Q6H PRN    melatonin, 6 mg, Oral,  "Nightly PRN    morphine, 2 mg, Intravenous, Q2H PRN    oxyCODONE, 5 mg, Oral, Q4H PRN     Family History   Problem Relation Name Age of Onset    Cancer Mother          cancer -- gastric and liver    Sudden death Mother      Cancer Father      Sudden death Father      Cancer Sister          breast    Breast cancer Sister      Coronary artery disease Paternal Grandfather          Review of Systems   Constitutional:  Negative for fever and unexpected weight change.   HENT:  Negative for ear discharge and ear pain.         Hard of hearing on right side   Eyes:  Negative for redness and itching.   Respiratory:  Negative for apnea, cough and wheezing.    Cardiovascular:  Negative for leg swelling.   Gastrointestinal:  Negative for vomiting.   Musculoskeletal:  Positive for back pain.   Skin: Negative.    Neurological:  Negative for speech difficulty and weakness.   Psychiatric/Behavioral:  Positive for confusion.           12 point review of systems conducted, negative except as stated in the history of present illness. See HPI for details.      Objective:   /73 (BP Location: Left arm, Patient Position: Lying)   Pulse 102   Temp 98.6 °F (37 °C)   Resp 15   Ht 5' 8" (1.727 m)   Wt 95.3 kg (210 lb)   SpO2 97%   BMI 31.93 kg/m²        Physical Exam  Constitutional:       General: He is not in acute distress.     Appearance: Normal appearance. He is obese. He is not toxic-appearing.   HENT:      Mouth/Throat:      Mouth: Mucous membranes are dry.   Cardiovascular:      Rate and Rhythm: Normal rate and regular rhythm.      Heart sounds: Murmur heard.   Pulmonary:      Effort: Pulmonary effort is normal.      Breath sounds: Normal breath sounds.   Abdominal:      General: Abdomen is flat. There is no distension.      Palpations: Abdomen is soft.      Tenderness: There is no abdominal tenderness.   Skin:     General: Skin is warm and dry.   Neurological:      General: No focal deficit present.      Mental Status: He " is alert.      Cranial Nerves: No cranial nerve deficit.      Gait: Abnormal gait: gait not tested.      Comments:  Oriented to time place and person, confused and slightly irritant.  Psychiatric:         Mood and Affect: Mood normal.         Behavior: Behavior normal    Advance Care Planning   Advance Directives:   Goals of Care: The patient, family, and healthcare power of   endorses that what is most important right now is to focus on comfort and QOL     Accordingly, we have decided that the best plan to meet the patient's goals includes enrolling in hospice care and pivot to comfort-focused care    Caregiver burden formerly assessed: Yes  Son lives 2[place]5 [place] drive from [place] and will not be available to take care of the patient.  Daughter can manage the patient 24/7 as she is working and lives separately, willing to take care intermittently during the day and currently considering options of sitter.    Miranda Beck MD  Internal Medicine - PGY-1

## 2024-12-03 PROBLEM — S06.300A TRAUMATIC INTRACRANIAL HEMORRHAGE WITHOUT LOSS OF CONSCIOUSNESS: Status: ACTIVE | Noted: 2024-12-03

## 2024-12-03 LAB
ALBUMIN SERPL-MCNC: 2.9 G/DL (ref 3.4–4.8)
ALBUMIN/GLOB SERPL: 0.8 RATIO (ref 1.1–2)
ALP SERPL-CCNC: 74 UNIT/L (ref 40–150)
ALT SERPL-CCNC: 25 UNIT/L (ref 0–55)
ANION GAP SERPL CALC-SCNC: 11 MEQ/L
AST SERPL-CCNC: 26 UNIT/L (ref 5–34)
BASOPHILS # BLD AUTO: 0.04 X10(3)/MCL
BASOPHILS NFR BLD AUTO: 0.3 %
BILIRUB SERPL-MCNC: 0.8 MG/DL
BUN SERPL-MCNC: 52.6 MG/DL (ref 8.4–25.7)
CALCIUM SERPL-MCNC: 8.6 MG/DL (ref 8.8–10)
CHLORIDE SERPL-SCNC: 116 MMOL/L (ref 98–107)
CO2 SERPL-SCNC: 17 MMOL/L (ref 23–31)
CREAT SERPL-MCNC: 2.35 MG/DL (ref 0.72–1.25)
CREAT/UREA NIT SERPL: 22
EOSINOPHIL # BLD AUTO: 0.03 X10(3)/MCL (ref 0–0.9)
EOSINOPHIL NFR BLD AUTO: 0.2 %
ERYTHROCYTE [DISTWIDTH] IN BLOOD BY AUTOMATED COUNT: 15.4 % (ref 11.5–17)
GFR SERPLBLD CREATININE-BSD FMLA CKD-EPI: 26 ML/MIN/1.73/M2
GLOBULIN SER-MCNC: 3.7 GM/DL (ref 2.4–3.5)
GLUCOSE SERPL-MCNC: 206 MG/DL (ref 82–115)
HCT VFR BLD AUTO: 32.7 % (ref 42–52)
HGB BLD-MCNC: 10.4 G/DL (ref 14–18)
IMM GRANULOCYTES # BLD AUTO: 0.08 X10(3)/MCL (ref 0–0.04)
IMM GRANULOCYTES NFR BLD AUTO: 0.6 %
LYMPHOCYTES # BLD AUTO: 0.88 X10(3)/MCL (ref 0.6–4.6)
LYMPHOCYTES NFR BLD AUTO: 6.6 %
MCH RBC QN AUTO: 30.8 PG (ref 27–31)
MCHC RBC AUTO-ENTMCNC: 31.8 G/DL (ref 33–36)
MCV RBC AUTO: 96.7 FL (ref 80–94)
MONOCYTES # BLD AUTO: 1.09 X10(3)/MCL (ref 0.1–1.3)
MONOCYTES NFR BLD AUTO: 8.2 %
NEUTROPHILS # BLD AUTO: 11.23 X10(3)/MCL (ref 2.1–9.2)
NEUTROPHILS NFR BLD AUTO: 84.1 %
NRBC BLD AUTO-RTO: 0 %
PLATELET # BLD AUTO: 198 X10(3)/MCL (ref 130–400)
PMV BLD AUTO: 10.4 FL (ref 7.4–10.4)
POCT GLUCOSE: 179 MG/DL (ref 70–110)
POCT GLUCOSE: 189 MG/DL (ref 70–110)
POCT GLUCOSE: 190 MG/DL (ref 70–110)
POCT GLUCOSE: 220 MG/DL (ref 70–110)
POCT GLUCOSE: 227 MG/DL (ref 70–110)
POTASSIUM SERPL-SCNC: 4.3 MMOL/L (ref 3.5–5.1)
PROT SERPL-MCNC: 6.6 GM/DL (ref 5.8–7.6)
RBC # BLD AUTO: 3.38 X10(6)/MCL (ref 4.7–6.1)
SODIUM SERPL-SCNC: 144 MMOL/L (ref 136–145)
WBC # BLD AUTO: 13.35 X10(3)/MCL (ref 4.5–11.5)

## 2024-12-03 PROCEDURE — 99900035 HC TECH TIME PER 15 MIN (STAT)

## 2024-12-03 PROCEDURE — 25000003 PHARM REV CODE 250

## 2024-12-03 PROCEDURE — 92523 SPEECH SOUND LANG COMPREHEN: CPT

## 2024-12-03 PROCEDURE — 25000003 PHARM REV CODE 250: Performed by: SURGERY

## 2024-12-03 PROCEDURE — 25000003 PHARM REV CODE 250: Performed by: INTERNAL MEDICINE

## 2024-12-03 PROCEDURE — 97530 THERAPEUTIC ACTIVITIES: CPT

## 2024-12-03 PROCEDURE — 63600175 PHARM REV CODE 636 W HCPCS

## 2024-12-03 PROCEDURE — 97535 SELF CARE MNGMENT TRAINING: CPT | Mod: CO

## 2024-12-03 PROCEDURE — 99900031 HC PATIENT EDUCATION (STAT)

## 2024-12-03 PROCEDURE — 63600175 PHARM REV CODE 636 W HCPCS: Performed by: INTERNAL MEDICINE

## 2024-12-03 PROCEDURE — 97162 PT EVAL MOD COMPLEX 30 MIN: CPT

## 2024-12-03 PROCEDURE — 63600175 PHARM REV CODE 636 W HCPCS: Performed by: SURGERY

## 2024-12-03 PROCEDURE — 36415 COLL VENOUS BLD VENIPUNCTURE: CPT

## 2024-12-03 PROCEDURE — 94760 N-INVAS EAR/PLS OXIMETRY 1: CPT

## 2024-12-03 PROCEDURE — 94799 UNLISTED PULMONARY SVC/PX: CPT

## 2024-12-03 PROCEDURE — 11000001 HC ACUTE MED/SURG PRIVATE ROOM

## 2024-12-03 PROCEDURE — 99233 SBSQ HOSP IP/OBS HIGH 50: CPT | Mod: ,,, | Performed by: INTERNAL MEDICINE

## 2024-12-03 PROCEDURE — 85025 COMPLETE CBC W/AUTO DIFF WBC: CPT

## 2024-12-03 PROCEDURE — 80053 COMPREHEN METABOLIC PANEL: CPT

## 2024-12-03 RX ORDER — IBUPROFEN 200 MG
24 TABLET ORAL
Status: DISCONTINUED | OUTPATIENT
Start: 2024-12-03 | End: 2024-12-05 | Stop reason: HOSPADM

## 2024-12-03 RX ORDER — INSULIN GLARGINE 100 [IU]/ML
8 INJECTION, SOLUTION SUBCUTANEOUS NIGHTLY
Status: DISCONTINUED | OUTPATIENT
Start: 2024-12-03 | End: 2024-12-05 | Stop reason: HOSPADM

## 2024-12-03 RX ORDER — IBUPROFEN 200 MG
16 TABLET ORAL
Status: DISCONTINUED | OUTPATIENT
Start: 2024-12-03 | End: 2024-12-05 | Stop reason: HOSPADM

## 2024-12-03 RX ORDER — LEVETIRACETAM 500 MG/1
500 TABLET ORAL 2 TIMES DAILY
Status: DISCONTINUED | OUTPATIENT
Start: 2024-12-03 | End: 2024-12-05 | Stop reason: HOSPADM

## 2024-12-03 RX ORDER — GLUCAGON 1 MG
1 KIT INJECTION
Status: DISCONTINUED | OUTPATIENT
Start: 2024-12-03 | End: 2024-12-05 | Stop reason: HOSPADM

## 2024-12-03 RX ORDER — ISOSORBIDE MONONITRATE 30 MG/1
30 TABLET, EXTENDED RELEASE ORAL DAILY
Status: DISCONTINUED | OUTPATIENT
Start: 2024-12-03 | End: 2024-12-05 | Stop reason: HOSPADM

## 2024-12-03 RX ORDER — LEVETIRACETAM 500 MG/1
500 TABLET ORAL 2 TIMES DAILY
Status: DISCONTINUED | OUTPATIENT
Start: 2024-12-03 | End: 2024-12-03

## 2024-12-03 RX ORDER — INSULIN ASPART 100 [IU]/ML
0-10 INJECTION, SOLUTION INTRAVENOUS; SUBCUTANEOUS
Status: DISCONTINUED | OUTPATIENT
Start: 2024-12-03 | End: 2024-12-05 | Stop reason: HOSPADM

## 2024-12-03 RX ADMIN — ATORVASTATIN CALCIUM 80 MG: 40 TABLET, FILM COATED ORAL at 08:12

## 2024-12-03 RX ADMIN — MUPIROCIN: 20 OINTMENT TOPICAL at 08:12

## 2024-12-03 RX ADMIN — METOPROLOL SUCCINATE 25 MG: 25 TABLET, EXTENDED RELEASE ORAL at 08:12

## 2024-12-03 RX ADMIN — LABETALOL HYDROCHLORIDE 10 MG: 5 INJECTION, SOLUTION INTRAVENOUS at 02:12

## 2024-12-03 RX ADMIN — LEVETIRACETAM 500 MG: 500 TABLET, FILM COATED ORAL at 08:12

## 2024-12-03 RX ADMIN — INSULIN GLARGINE 8 UNITS: 100 INJECTION, SOLUTION SUBCUTANEOUS at 08:12

## 2024-12-03 RX ADMIN — MUPIROCIN: 20 OINTMENT TOPICAL at 09:12

## 2024-12-03 RX ADMIN — INSULIN ASPART 2 UNITS: 100 INJECTION, SOLUTION INTRAVENOUS; SUBCUTANEOUS at 05:12

## 2024-12-03 RX ADMIN — MICONAZOLE NITRATE: 20 POWDER TOPICAL at 08:12

## 2024-12-03 RX ADMIN — POLYETHYLENE GLYCOL 3350 17 G: 17 POWDER, FOR SOLUTION ORAL at 08:12

## 2024-12-03 RX ADMIN — MICONAZOLE NITRATE: 20 POWDER TOPICAL at 09:12

## 2024-12-03 RX ADMIN — DOCUSATE SODIUM 100 MG: 100 CAPSULE, LIQUID FILLED ORAL at 08:12

## 2024-12-03 RX ADMIN — LEVETIRACETAM 500 MG: 100 INJECTION, SOLUTION INTRAVENOUS at 09:12

## 2024-12-03 RX ADMIN — Medication: at 09:12

## 2024-12-03 RX ADMIN — INSULIN ASPART 4 UNITS: 100 INJECTION, SOLUTION INTRAVENOUS; SUBCUTANEOUS at 12:12

## 2024-12-03 RX ADMIN — INSULIN ASPART 1 UNITS: 100 INJECTION, SOLUTION INTRAVENOUS; SUBCUTANEOUS at 01:12

## 2024-12-03 RX ADMIN — ISOSORBIDE MONONITRATE 30 MG: 30 TABLET, EXTENDED RELEASE ORAL at 12:12

## 2024-12-03 RX ADMIN — Medication: at 08:12

## 2024-12-03 RX ADMIN — EZETIMIBE 10 MG: 10 TABLET ORAL at 08:12

## 2024-12-03 NOTE — PT/OT/SLP EVAL
Physical Therapy Evaluation    Patient Name:  Damon Moran   MRN:  [id number]    Recommendations:     Discharge therapy intensity: Moderate Intensity Therapy   Discharge Equipment Recommendations: walker, rolling, wheelchair   Barriers to discharge: Decreased caregiver support, Impaired mobility, and Ongoing medical needs    Assessment:     Damon Moran is a 84 y.o. male admitted with a medical diagnosis of fall off of rollator resulting in: SDH, SAH, NSTEMI, elevated troponin, occipital bone fx.  He presents with the following impairments/functional limitations: weakness, impaired endurance, impaired self care skills, impaired functional mobility, gait instability, impaired balance, impaired cognition, decreased safety awareness, impaired cardiopulmonary response to activity. Pt with fair response to PT evaluation but limited by hypotension with mobility. He denied dizziness when asked, but pt grimacing with increased difficulty following commands with transfers and standing, prompting therapist to assess. At baseline, pt sister reports he lived alone and was independent but admits she managed meds, provided prepared meals, and sitter assisted with bathing tasks. Pt's mobility had been more limited recently, and pt was using rollator as wheelchair and scooting around the home. Pt's cognitive status will limit potential to return to independence and pt will require 24 hr care in the future. Pt's sister hopefull for placement prior to return home to increase strength and allow time to arrange increased home services.    Rehab Prognosis: Fair; patient would benefit from acute skilled PT services to address these deficits and reach maximum level of function.    Recent Surgery: * No surgery found *      Plan:     During this hospitalization, patient would benefit from acute PT services 5 x/week to address the identified rehab impairments via gait training, therapeutic activities, therapeutic exercises, neuromuscular  re-education and progress toward the following goals:    Plan of Care Expires:  01/03/25    Subjective     Chief Complaint: none   Patient/Family Comments/goals: increase strength before return home  Pain/Comfort:  Pain Rating 1: 0/10  Pain Rating Post-Intervention 1: 0/10    Patients cultural, spiritual, Hinduism conflicts given the current situation:      Living Environment:  Pt lives in single level home, no steps to enter.  Prior to admission, patients level of function was mod Ind with rollator.  Equipment used at home: rollator.  DME owned (not currently used): none.  Upon discharge, patient will have assistance from sitters and daughter.    Objective:     Communicated with nurse prior to session.  Patient found HOB elevated with blood pressure cuff, PureWick, peripheral IV, pulse ox (continuous), telemetry  upon PT entry to room.    General Precautions: Standard, fall (SBP<140)  Orthopedic Precautions:N/A   Braces: N/A  Respiratory Status: Room air  Blood Pressure:   -89/68  -100/42  -97/67      Exams:  Cognitive Exam:  Patient is oriented to Person, Place, Time, and Situation  Gross Motor Coordination:  WFL  RLE ROM: WFL  RLE Strength: WFL  LLE ROM: WFL  LLE Strength: WFL  Skin integrity: Visible skin intact      Functional Mobility:  Bed Mobility:     Rolling Left:  minimum assistance  Scooting: minimum assistance  Supine to Sit: minimum assistance  Transfers:     Sit to Stand:  moderate assistance with rolling walker  Gait: 5ft + 3ft with RW mod A with forward flexed posture and short step length. Assistance required to safely manage AD. Distance limited due to safety  Balance: poor standing balance with forward trunk lean and posterior pelvic pushing      AM-PAC 6 CLICK MOBILITY  Total Score:13   Patient provided with verbal education and demonstrations education regarding PT role/goals/POC, fall prevention, and safety awareness.  Additional teaching is warranted.     Patient left up in chair with all  lines intact, call button in reach, chair alarm on, nurse notified, and Posey vest .    GOALS:   Multidisciplinary Problems       Physical Therapy Goals          Problem: Physical Therapy    Goal Priority Disciplines Outcome Interventions   Physical Therapy Goal     PT, PT/OT Progressing    Description: Goals to be met by: 2025     Patient will increase functional independence with mobility by performin. Supine to sit with Stand-by Assistance  2. Sit to stand transfer with Stand-by Assistance  3. Bed to chair transfer with Stand-by Assistance using Rolling Walker  4. Gait  x 50 feet with Stand-by Assistance using Rolling Walker.                          History:     Past Medical History:   Diagnosis Date    Atrial paroxysmal tachycardia     Chronic kidney disease, stage 3     Coronary arteriosclerosis     Diastolic dysfunction     HTN (hypertension)     Iron deficiency anemia, unspecified     Obstructive sleep apnea syndrome     Prostate cancer     Pure hypercholesterolemia     Type 2 diabetes mellitus without complications        Past Surgical History:   Procedure Laterality Date    CBP      CORONARY ARTERY BYPASS GRAFT (CABG)      x 3    Repair of finger laceration      resection of atypical mole         Time Tracking:     PT Received On: 24  PT Start Time: 1055     PT Stop Time: 1127  PT Total Time (min): 32 min     Billable Minutes: Evaluation mod and Therapeutic Activity 10      2024

## 2024-12-03 NOTE — PLAN OF CARE
SSC spoke to Debi @ [facility], awaiting confirmation if pt is requiring soft restraints prior to acceptance. Debi states she will let me know on decision.

## 2024-12-03 NOTE — ASSESSMENT & PLAN NOTE
Patient's hemorrhage is due to trauma/laceration, patient does not have a propensity for bleeding.. Patients most recent Hgb, Hct, platelets, and INR are listed below.  Recent Labs     11/30/24  1618 12/01/24  0433 12/02/24  0346 12/03/24  0322   HGB  --  10.9* 11.0* 10.4*   HCT  --  34.1* 34.8* 32.7*   PLT  --  195 211 198   INR 1.1  --   --   --      Plan  Family elected for no surgical intervention.  Plan is for rehab and eventually hospice.

## 2024-12-03 NOTE — PLAN OF CARE
Nursing updates me that they have not used restraints since yesterday afternoon when pt pulled out his IV.  Nursing tells me no prn meds were needed during the night. Pt is doing better today with no mittens on. They do have a roll belt when he is in chair

## 2024-12-03 NOTE — NURSING
Subjective:      Patient ID: Damon Moran is a 84 y.o. male.    Chief Complaint: No chief complaint on file.    HPI  Review of Systems   Objective:     Physical Exam   Assessment:     1. Traumatic subdural hematoma without loss of consciousness, initial encounter    2. Suspected cerebrovascular accident (CVA)    3. NSTEMI (non-ST elevated myocardial infarction)    4. CAD (coronary artery disease)    5. Acute kidney injury superimposed on chronic kidney disease    6. DNR (do not resuscitate)    7. Shoulder pain, unspecified chronicity, unspecified laterality           Wound 11/30/24 1800 Other (comment) lower Sacral spine (Active)   11/30/24 1800 Sacral spine   Present on Original Admission: Y   Primary Wound Type: Other   Side:    Orientation: lower   Wound Approximate Age at First Assessment (Weeks):    Wound Number:    Is this injury device related?:    Incision Type:    Closure Method:    Wound Description (Comments):    Type:    Additional Comments:    Ankle-Brachial Index:    Pulses:    Removal Indication and Assessment:    Wound Outcome:    Wound Image   12/02/24 1200   Dressing Appearance Dry 12/03/24 0735   Drainage Amount None 12/03/24 0735   Drainage Characteristics/Odor No odor 12/02/24 1200   Appearance Pink 12/02/24 2000   Tissue loss description Not applicable 12/02/24 1200   Care Applied:;Skin Barrier 12/03/24 0735   Dressing Foam 12/03/24 0735       Plan:     Closed TBI (traumatic brain injury)  He has SDH and SAH.  Family elected for no surgical intervention and hospice care.  But the patient is alert this morning, following commands, moving all extremities.  Will get ST/OT/PT eval and treat and also consult case management for possible hospice vs. Rehab.      NSTEMI (non-ST elevated myocardial infarction)  No cardiac intervention planned.  Family prefers palliative care.  Continue supportive care.  He is asymptomatic.      Dysphagia  ST eval pending for diet.      Essential (primary)  hypertension  Patients blood pressure range in the last 24 hours was: BP  Min: 103/89  Max: 158/101.The patient's inpatient anti-hypertensive regimen is listed below:  Current Antihypertensives  niCARdipine 40 mg/200 mL (0.2 mg/mL) infusion, Continuous, Intravenous  hydrALAZINE injection 10 mg, Every 6 hours PRN, Intravenous  labetaloL injection 10 mg, Every 6 hours PRN, Intravenous    Plan  - BP is uncontrolled, will adjust as follows: continue prn iv until safe to swallow  - Resume home meds once cleared by st.    Type 2 diabetes mellitus with stage 3a chronic kidney disease, without long-term current use of insulin  SSI for now.  83 y/o male in with tramatic  SDH.  Awake alert but confused at times.  Has mittons for protection from Ivs etc.  Spouse at Daughter at bedside.      He has some dried macerated skin to buttock area-see photo-Care put inplace with instructions to nursing.   He is on an ICU total care bed and being turned q2hrs per staffing per shift.    Probable downgrade to floors in the next 24hrs.     Will follow weekly while in hospital.

## 2024-12-03 NOTE — PT/OT/SLP PROGRESS
Occupational Therapy   Treatment    Name: Damon Moran  MRN: [id number]  Admitting Diagnosis:  Traumatic intracranial hemorrhage without loss of consciousness       Recommendations:     Recommended therapy intensity at discharge: Moderate Intensity Therapy   Discharge Equipment Recommendations:  wheelchair, walker, rolling  Barriers to discharge:       Assessment:     Damon Moran is a 84 y.o. male with a medical diagnosis of Traumatic intracranial hemorrhage without loss of consciousness.  He presents with orthostatic hypotension and poor safety awareness. Pt. Is a high fall risk Recommending Mod intensity therapy pending progress. Performance deficits affecting function are weakness, impaired self care skills, impaired functional mobility, gait instability, impaired balance.     Rehab Prognosis:  Fair; patient would benefit from acute skilled OT services to address these deficits and reach maximum level of function.       Plan:     Patient to be seen 5 x/week to address the above listed problems via self-care/home management, therapeutic activities, therapeutic exercises  Plan of Care Expires: 01/02/25  Plan of Care Reviewed with: patient, daughter    Subjective     Pain/Comfort:       Objective:     Communicated with: RN prior to session.  Patient found HOB elevated with   upon OT entry to room.    General Precautions: Standard, fall (SBP <140)    Orthopedic Precautions:N/A  Braces: N/A  Respiratory Status: Room air  Vital Signs: Blood Pressure: 120/76, 89/68, 100/42-seated in BS chair with B LE elevated.     Occupational Performance:   (Bed Mobility-Mod A)  (Sitting balance- CGA/Min A)  (Sit to stand- Min A)  Pt. Ambulating from EOB to toilet using RW for UE support with balance, cueing required for safety, pt. Leaving RW and sitting without warning.   Mod A during toilet t/f for safe descend onto low surface.   Total A for pericare in supported stance.   Drop in BP however pt. Asymptomatic.  Pt. Left UIC with  all needs within reach.   Therapeutic Positioning    OT interventions performed during the course of today's session in an effort to prevent and/or reduce acquired pressure injuries:   Therapeutic positioning was provided at the conclusion of session to offload all bony prominences for the prevention and/or reduction of pressure injuries      [facility] 6 Click ADL:      Patient Education:  Patient provided with verbal education education regarding fall prevention, safety awareness, and pressure ulcer prevention.  Additional teaching is warranted.      Patient left up in chair with all lines intact and call button in reach.    GOALS:   Multidisciplinary Problems       Occupational Therapy Goals          Problem: Occupational Therapy    Goal Priority Disciplines Outcome Interventions   Occupational Therapy Goal     OT, PT/OT Progressing    Description: goals to be met by 1/2/25    Pt will complete grooming standing at sink with LRAD with SBA.  Pt will complete UB dressing with SBA.  Pt will complete LB dressing with SBA using LRAD.  Pt will complete toileting with SBA using LRAD.  Pt will complete functional mobility to/from toilet and toilet transfer with SBA using LRAD.                        Time Tracking:     OT Date of Treatment: 12/03/24  OT Start Time: 1058  OT Stop Time: 1125  OT Total Time (min): 27 min    Billable Minutes:Self Care/Home Management 2    OT/ALBA: ALBA     Number of ALBA visits since last OT visit: 1    12/3/2024

## 2024-12-03 NOTE — PROGRESS NOTES
[facility] Hospital Medicine  Progress Note    Patient Name: Damon Moran  MRN: [id number]  Patient Class: IP- Inpatient   Admission Date: 11/30/2024  Length of Stay: 3 days  Attending Physician: Renuka Lowery MD  Primary Care Provider: Renuka Lowery MD        Subjective     Principal Problem:Traumatic intracranial hemorrhage without loss of consciousness  Acute Condition: stable        HPI:  No notes on file    Overview/Hospital Course:  No notes on file    Interval History: Patient required soft restraints overnight for agitation.  And he required one dose of haldol yesterday evening.    Review of Systems  Objective:     Vital Signs (Most Recent):  Temp: 98.5 °F (36.9 °C) (12/03/24 0800)  Pulse: 95 (12/03/24 0852)  Resp: (!) 21 (12/03/24 0800)  BP: 135/86 (12/03/24 0852)  SpO2: 95 % (12/03/24 0800) Vital Signs (24h Range):  Temp:  [98.1 °F (36.7 °C)-98.8 °F (37.1 °C)] 98.5 °F (36.9 °C)  Pulse:  [] 95  Resp:  [12-26] 21  SpO2:  [95 %-100 %] 95 %  BP: (102-154)/() 135/86     Weight: 95.3 kg (210 lb)  Body mass index is 31.93 kg/m².    Intake/Output Summary (Last 24 hours) at 12/3/2024 1123  Last data filed at 12/3/2024 0858  Gross per 24 hour   Intake 2480.48 ml   Output 1250 ml   Net 1230.48 ml         Physical Exam  Constitutional:       Appearance: He is obese.   Cardiovascular:      Rate and Rhythm: Normal rate and regular rhythm.      Heart sounds: Murmur heard.   Pulmonary:      Effort: Pulmonary effort is normal.      Breath sounds: Normal breath sounds.   Abdominal:      General: Abdomen is flat.      Palpations: Abdomen is soft.   Skin:     General: Skin is warm and dry.   Neurological:      General: No focal deficit present.      Mental Status: He is alert.   Psychiatric:         Mood and Affect: Mood normal.             Significant Labs: All pertinent labs within the past 24 hours have been reviewed.  Recent Lab Results         12/03/24  0900    12/03/24  0322   12/03/24  0142   12/02/24  1741        Albumin/Globulin Ratio   0.8           Albumin   2.9           ALP   74           ALT   25           Anion Gap   11.0           AST   26           Baso #   0.04           Basophil %   0.3           BILIRUBIN TOTAL   0.8           BUN   52.6           BUN/CREAT RATIO   22           Calcium   8.6           Chloride   116           CO2   17           Creatinine   2.35           eGFR   26           Eos #   0.03           Eos %   0.2           Globulin, Total   3.7           Glucose   206           Hematocrit   32.7           Hemoglobin   10.4           Immature Grans (Abs)   0.08           Immature Granulocytes   0.6           Lymph #   0.88           LYMPH %   6.6           MCH   30.8           MCHC   31.8           MCV   96.7           Mono #   1.09           Mono %   8.2           MPV   10.4           Neut #   11.23           Neut %   84.1           nRBC   0.0           Platelet Count   198           POCT Glucose 179     220   235       Potassium   4.3           PROTEIN TOTAL   6.6           RBC   3.38           RDW   15.4           Sodium   144           WBC   13.35                   Significant Imaging: I have reviewed all pertinent imaging results/findings within the past 24 hours.    Assessment and Plan     * Traumatic intracranial hemorrhage without loss of consciousness  Patient's hemorrhage is due to trauma/laceration, patient does not have a propensity for bleeding.. Patients most recent Hgb, Hct, platelets, and INR are listed below.  Recent Labs     11/30/24  1618 12/01/24  0433 12/02/24  0346 12/03/24  0322   HGB  --  10.9* 11.0* 10.4*   HCT  --  34.1* 34.8* 32.7*   PLT  --  195 211 198   INR 1.1  --   --   --      Plan  Family elected for no surgical intervention.  Plan is for rehab and eventually hospice.    Dysphagia  He passed his swallow study.  And he is eating well.      NSTEMI (non-ST elevated myocardial infarction)  No cardiac intervention  planned.  Family prefers palliative care.  Continue supportive care.  He is asymptomatic.  Resume Imdur.      Closed TBI (traumatic brain injury)  He has SDH and SAH.  Family elected for no surgical intervention and hospice care.  PT/OT following patient.  Plan is for rehab at SNF to build up his strength before being admitted to hospice.  Continue supportive care.  He is on keppra for seizure prophylaxis.    Chronic diastolic heart failure  BUN is rising along with Cr.  Will hold lasix for the time being.      Essential (primary) hypertension  Patients blood pressure range in the last 24 hours was: BP  Min: 102/78  Max: 154/120.The patient's inpatient anti-hypertensive regimen is listed below:  Current Antihypertensives  niCARdipine 40 mg/200 mL (0.2 mg/mL) infusion, Continuous, Intravenous  hydrALAZINE injection 10 mg, Every 6 hours PRN, Intravenous  labetaloL injection 10 mg, Every 6 hours PRN, Intravenous  metoprolol succinate (TOPROL-XL) 24 hr tablet 25 mg, Daily, Oral    Plan  - BP is uncontrolled, will adjust as follows: continue prn iv until safe to swallow  - Home meds resumed.    Type 2 diabetes mellitus with stage 3a chronic kidney disease, without long-term current use of insulin  Add lantus at bedtime and continue SSI for now.      VTE Risk Mitigation (From admission, onward)           Ordered     IP VTE HIGH RISK PATIENT  Once         11/30/24 1712     Reason for No Pharmacological VTE Prophylaxis  Once        Comments: Traumatic Injuries   Question:  Reasons:  Answer:  Active Bleeding  Comment:  SDH    11/30/24 1712     Place sequential compression device  Until discontinued         11/30/24 1712                    Discharge Planning   KURTIS:      Code Status: DNR   Is the patient medically ready for discharge?:      Discharge Plan A: Home, Hospice/home                Please place Justification for DME        Renuka Lowery MD  Department of Hospital Medicine   [facility]

## 2024-12-03 NOTE — PLAN OF CARE
Problem: Adult Inpatient Plan of Care  Goal: Plan of Care Review  Outcome: Progressing  Goal: Patient-Specific Goal (Individualized)  Outcome: Progressing  Goal: Absence of Hospital-Acquired Illness or Injury  Outcome: Progressing  Goal: Optimal Comfort and Wellbeing  Outcome: Progressing  Goal: Readiness for Transition of Care  Outcome: Progressing     Problem: Diabetes Comorbidity  Goal: Blood Glucose Level Within Targeted Range  Outcome: Progressing     Problem: Skin Injury Risk Increased  Goal: Skin Health and Integrity  Outcome: Progressing     Problem: Fall Injury Risk  Goal: Absence of Fall and Fall-Related Injury  Outcome: Progressing     Problem: Coping Ineffective  Goal: Effective Coping  Outcome: Progressing     Problem: Wound  Goal: Optimal Coping  Outcome: Progressing  Goal: Optimal Functional Ability  Outcome: Progressing  Goal: Absence of Infection Signs and Symptoms  Outcome: Progressing  Goal: Improved Oral Intake  Outcome: Progressing  Goal: Optimal Pain Control and Function  Outcome: Progressing  Goal: Skin Health and Integrity  Outcome: Progressing  Goal: Optimal Wound Healing  Outcome: Progressing     Problem: Restraint, Nonviolent  Goal: Absence of Harm or Injury  Outcome: Progressing

## 2024-12-03 NOTE — ASSESSMENT & PLAN NOTE
Patients blood pressure range in the last 24 hours was: BP  Min: 102/78  Max: 154/120.The patient's inpatient anti-hypertensive regimen is listed below:  Current Antihypertensives  niCARdipine 40 mg/200 mL (0.2 mg/mL) infusion, Continuous, Intravenous  hydrALAZINE injection 10 mg, Every 6 hours PRN, Intravenous  labetaloL injection 10 mg, Every 6 hours PRN, Intravenous  metoprolol succinate (TOPROL-XL) 24 hr tablet 25 mg, Daily, Oral    Plan  - BP is uncontrolled, will adjust as follows: continue prn iv until safe to swallow  - Home meds resumed.

## 2024-12-03 NOTE — PROGRESS NOTES
Advance Care Planning     Date: 12/03/2024    Code Status  In light of the patients advanced and life limiting illness,I engaged the the patient and family in a voluntary conversation about the patient's preferences for care  at the very end of life. The patient wishes to have a natural, peaceful death.  Along those lines, the patient does not wish to have CPR or other invasive treatments performed when his heart and/or breathing stops. I communicated to the patient and family that a LaPOST form was completed to reflect other EOL preferences of the patient such as A. DNR/ Do Not Attempt Resuscitation (Allow Natural Death), B. Selective Treatment with primary goal of treating medical conditions while avoiding burdensome treatments. And C. No artificial nutrition by tube . This was discussed with patient and patient's daughter and son by multiple providers, and reconfirmed elections with patient and patient's daughter at bedside with completion of LaPOST.     Palliative Care Visit was spent on advance care planning, goals of care discussion, emotional support, formulating and communicating prognosis and exploring burden/benefit of various approaches of treatment. This discussion occurred on a fully voluntary basis with the verbal consent of the patient and/or family.

## 2024-12-03 NOTE — PT/OT/SLP EVAL
[facility]  Speech Language Pathology Department  Cognitive-Communication Evaluation    Patient Name:  Damon Moran   MRN:  [id number]    Recommendations     General recommendations:  cognitive-linguistic therapy  Communication strategies:  provide increased time to answer and go to room if call light pushed    Discharge therapy intensity: Moderate Intensity Therapy  Barriers to safe discharge: limited insight into deficits, decreased communication skills, and safety awareness    History     Damon Moran is a/n 84 y.o. male admitted  as a level 2 trauma following a fall from his wheelchair.  Injuries include a SDH and cardiac contusion.  A code FAST was called in the ED and a CVA workup was initiated.  Pt failed nursing swallow screen.     Past Medical History:   Diagnosis Date    Atrial paroxysmal tachycardia     Chronic kidney disease, stage 3     Coronary arteriosclerosis     Diastolic dysfunction     HTN (hypertension)     Iron deficiency anemia, unspecified     Obstructive sleep apnea syndrome     Prostate cancer     Pure hypercholesterolemia     Type 2 diabetes mellitus without complications      Past Surgical History:   Procedure Laterality Date    CBP      CORONARY ARTERY BYPASS GRAFT (CABG)      x 3    Repair of finger laceration      resection of atypical mole         Previous level of Function  Education:college  Occupation: retired  Lives: alone  Handed: Right  Glasses: yes (reading)  Hearing Aids: no  Home Responsibilities: drives, financial management, medication/health management, laundry, shopping, and meal preparation    Subjective     Patient awake, alert, and cooperative.  Patient goals: to go home   Spiritual/Cultural/Mormonism Beliefs/Practices that affect care: no    Pain/Comfort: Pain Rating 1: 0/10    Respiratory Status:  room air    Objective     ORAL MUSCULATURE  Dentition: missing teeth and teeth in poor condition  Facial Movement: WFL  Buccal Strength &  Mobility: WFL  Mandibular Strength & Mobility: WFL  Oral Labial Strength & Mobility: WFL  Lingual Strength & Mobility: WFL    SPEECH PRODUCTION  Phoneme Production: adequate  Voice Quality: adequate  Voice Production: adequate  Speech Rate: appropriate  Loudness: reduced  Respiration: WFL for speech  Resonance: adequate  Prosody: adequate  Speech Intelligibility  Known Context: 75%-90%  Unknown Context: 75%-90%    AUDITORY COMPREHENSION  Following Directions:  1-Step: 80%  2-Step: 100%  Yes/No Questions:  Biographical: 70%  Environmental: 100%  Simple: 80%    VERBAL EXPRESSION  Automatic Speech:  Days of the week: 100%  Months of the year: 100%  Countin%  Alphabet: 100%    Confrontation Naming  Objects: 100%  Wh- Questions:  Object name: 80%  Object function: 100%    COGNITION  Orientation:  Person: yes  Place: yes  Time: inconsistent  Situation: inconsistent   Attention:  Focused: Within Functional Limits  Sustained: Within Functional Limits  Alternating: Within Functional Limits  Divided: Within Functional Limits  Pragmatics:  Eye contact: Within Functional Limits  Personal space: Within Functional Limits  Facial expression: Within Functional Limits  Topic Maintenance: Impaired  Response Length: Impaired  Memory:  Immediate: Impaired  Delayed: Impaired (N/A could not complete four word memory task)  Long Term: Impaired  Problem Solving  Functional simple: Impaired  Organization:  Convergent thinking: Impaired (Pt was able to answer when given binary choice)  Divergent thinking: Impaired      Assessment     Pt presents with cognitive-linguistic deficits negatively impacting safe and independent living.     Goals     Multidisciplinary Problems       SLP Goals          Problem: SLP    Goal Priority Disciplines Outcome   SLP Goal     SLP Progressing   Description: LTG: The patient will minimally complex tasks with supervision    STGs:  1. The pt will complete basic problem-solving tasks given moderate cues.   2.  The pt will be oriented x4 with modified independence.   3. Complete basic reasoning tasks given moderate cues                      Patient Education     Patient and daughter/s were provided with verbal education regarding SLP POC.  Understanding was verbalized.    Plan     SLP Follow-Up:  Yes   Patient to be seen:  5 x/week   Plan of Care expires:  12/17/24  Plan of Care reviewed with:  patient, daughter      Time Tracking     SLP Treatment Date:   12/03/24  Speech Start Time:  1030  Speech Stop Time:  1050     Speech Total Time (min):  20 min    Billable minutes:  Evaluation of Speech Sound Production with Comprehension and Expression, 20 minutes     12/03/2024

## 2024-12-03 NOTE — ASSESSMENT & PLAN NOTE
He has SDH and SAH.  Family elected for no surgical intervention and hospice care.  PT/OT following patient.  Plan is for rehab at SNF to build up his strength before being admitted to hospice.  Continue supportive care.  He is on keppra for seizure prophylaxis.

## 2024-12-03 NOTE — PLAN OF CARE
Problem: Physical Therapy  Goal: Physical Therapy Goal  Description: Goals to be met by: 2025     Patient will increase functional independence with mobility by performin. Supine to sit with Stand-by Assistance  2. Sit to stand transfer with Stand-by Assistance  3. Bed to chair transfer with Stand-by Assistance using Rolling Walker  4. Gait  x 50 feet with Stand-by Assistance using Rolling Walker.     Outcome: Progressing

## 2024-12-03 NOTE — PLAN OF CARE
Problem: SLP  Goal: SLP Goal  Description:   LTG: The patient will complete minimally complex tasks with supervision.    STGs:  1. The pt will complete basic problem-solving tasks given moderate cues.   2. The pt will be oriented x4 with modified independence.   3. Complete basic reasoning tasks given moderate cues.  Outcome: Progressing

## 2024-12-03 NOTE — ASSESSMENT & PLAN NOTE
No cardiac intervention planned.  Family prefers palliative care.  Continue supportive care.  He is asymptomatic.  Resume Imdur.

## 2024-12-03 NOTE — SUBJECTIVE & OBJECTIVE
Interval History: Patient required soft restraints overnight for agitation.  And he required one dose of haldol yesterday evening.    Review of Systems  Objective:     Vital Signs (Most Recent):  Temp: 98.5 °F (36.9 °C) (12/03/24 0800)  Pulse: 95 (12/03/24 0852)  Resp: (!) 21 (12/03/24 0800)  BP: 135/86 (12/03/24 0852)  SpO2: 95 % (12/03/24 0800) Vital Signs (24h Range):  Temp:  [98.1 °F (36.7 °C)-98.8 °F (37.1 °C)] 98.5 °F (36.9 °C)  Pulse:  [] 95  Resp:  [12-26] 21  SpO2:  [95 %-100 %] 95 %  BP: (102-154)/() 135/86     Weight: 95.3 kg (210 lb)  Body mass index is 31.93 kg/m².    Intake/Output Summary (Last 24 hours) at 12/3/2024 1123  Last data filed at 12/3/2024 0858  Gross per 24 hour   Intake 2480.48 ml   Output 1250 ml   Net 1230.48 ml         Physical Exam  Constitutional:       Appearance: He is obese.   Cardiovascular:      Rate and Rhythm: Normal rate and regular rhythm.      Heart sounds: Murmur heard.   Pulmonary:      Effort: Pulmonary effort is normal.      Breath sounds: Normal breath sounds.   Abdominal:      General: Abdomen is flat.      Palpations: Abdomen is soft.   Skin:     General: Skin is warm and dry.   Neurological:      General: No focal deficit present.      Mental Status: He is alert.   Psychiatric:         Mood and Affect: Mood normal.             Significant Labs: All pertinent labs within the past 24 hours have been reviewed.  Recent Lab Results         12/03/24  0900   12/03/24  0322   12/03/24  0142   12/02/24  1741        Albumin/Globulin Ratio   0.8           Albumin   2.9           ALP   74           ALT   25           Anion Gap   11.0           AST   26           Baso #   0.04           Basophil %   0.3           BILIRUBIN TOTAL   0.8           BUN   52.6           BUN/CREAT RATIO   22           Calcium   8.6           Chloride   116           CO2   17           Creatinine   2.35           eGFR   26           Eos #   0.03           Eos %   0.2           Globulin,  Physical Therapy Evaluation    Visit Count: 1   Plan of Care: 10/26/2020 Through: 3/1/2021  Insurance Information: Authorization   Payor: Chiara Ramsay   Auth #  [id number]   Discipline: PT   Date Span:  10/20/2020 - 10/20/2021   Number of visits:  15    Referred by: Trinidad Hunt MD; Next provider visit (if known/scheduled): none scheduled  Medical Diagnosis (from order):   Diagnosis Information      Diagnosis    625.0 (ICD-9-CM) - N94.10 (ICD-10-CM) - Dyspareunia, female              Treatment Diagnosis: Pelvic floor dysfunction with increased pain/symptoms, impaired tissue mobility, impaired activity tolerance, impaired sexual health    Date of onset/injury: 10/25/20  Diagnosis Precautions: chronic pain  Chart reviewed at time of initial evaluation (relevant co-morbidities, allergies, tests and medications listed):   Pt has regular menstrual cycle with no PMS symptoms. Birthcontrol:  had a vasectomy  Pt with h/o anxiety/depression; trying to connect with mental health provider in [place]. She and her  has just moved back to [place] after being discharged from  service with her  who also served. During pt's pregnancy, they learned that her  had a serious lung infection and had to have 1/2 of his lung removed. Initial diagnosis was lung CA, but that was elimiated at the time of surgery. Pt had many stressors during the time of her pregnancy and delivery.      SUBJECTIVE   Pt reports that she had her babies 15 months apart and after her 2nd delivery she began to experience dyspareunia.   Pain is occasionally with entrance on the R side, but more often with deeper penetration.   She delivered both children in [facility] in a [facility] hospital. Around the time of her delivery, she knew her  did not have CA, but knew he was going ot have carreer altering surgery and she was unable to see him while hospitalized due to COVID. They made multiple moves before settling in [place] after  Total   3.7           Glucose   206           Hematocrit   32.7           Hemoglobin   10.4           Immature Grans (Abs)   0.08           Immature Granulocytes   0.6           Lymph #   0.88           LYMPH %   6.6           MCH   30.8           MCHC   31.8           MCV   96.7           Mono #   1.09           Mono %   8.2           MPV   10.4           Neut #   11.23           Neut %   84.1           nRBC   0.0           Platelet Count   198           POCT Glucose 179     220   235       Potassium   4.3           PROTEIN TOTAL   6.6           RBC   3.38           RDW   15.4           Sodium   144           WBC   13.35                   Significant Imaging: I have reviewed all pertinent imaging results/findings within the past 24 hours.   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 They are closing on a home and moving this weekend.   Pt has been living with her parents while they built a home for the last 6 months; Added stress of her  looking for and finally finding employement.   Her children are 2 and 3. She nursed them both and is no longer nursing.      OB History    Para Term  AB Living   2 2 2 0 0 2   SAB TAB Ectopic Molar Multiple Live Births   0 0 0 0 0 2     Delivery Type: vaginal: first delivery she experienced a 1st degree labial tear requiring one stitch;  2nd delivery: no stitches \"easy\" deliveries without meds. \"felt great\"     Pt reports that to get pregnant for her first she went through IUI treatment. The 2nd happened unexpectedly.     Medical/Surgical History:   • History of sexual abuse or trauma: No  • History of urinary tract infection: Yes as a child she got them quite often due to having urinary reflux. \"mostly through puberty, it was really bad. Don't remember when I had my last one.\"   • Surgery: labral repair with osteotomy of L greater trochanter and pelvis.     Bladder Symptoms    patient denies bladder symptoms    Bowel Symptoms:  Pt has IBS-D   Reports colonoscopy revealed many \"twists and turns\" and sensitivity. Has to be aware of what she eats to keep this under control.   Had taken a probiotic for awhile, but has not been on it for awhile  She knows that stress effects her IBS; Bloating and cramping only when her IBS is acting up, which is once/week    Pain:   • Pain reduced tolerance of: pelvic exam, sexual intercourse, during menstrual cycle, she feels like her labia is heavy and throbbing. This occurs in first 2 days of her cycle. She has a 4-5 day cylce.  Reports that her most recent pap was terribly painful. She has never experienced this before the delivery of her 2nd child.   • Pain location: R side with insertion; L side during her most recent pap.    • Pain  description: sharp with insertion; heavy pressure, throbbing during menstrual cycle.   • Severity of pain: intercourse:  7/10; Rarely reaches orgasm due to pain;  Has intercourse infrequently. This is affecting her relationship and her anxiety and depression    • What makes pain better? Refraining from activity   • Since onset pain is getting: worse and intercourse is becoming more infrequent.      Dietary Habits:  Fluid consumption: Water:  4 -8 x 16 oz/day;  Alcohol 1-2/week; one soda/day, but plans on stopping;  No coffee.     Function:   Limitations/exacerbating factors: above described symptoms causing difficulty with childcare activities, restriction in age appropriate activities, difficulty obtaining pelvic exams for routine medical care, anxiety, depression  Prior level: pt with progressively increasing pain so was referred for PT intervention  Patient Goals: Wants to be able to enjoy sex again.     Prior Treatment: no therapies in the past year for current condition. Hospitalization, home health services or skilled nursing facility in the last 30 days: No, per patient.  Home Environment/Social Support: Patient lives with  and 2 children.  Patient has assistance as needed from family/friends.    Safety:  Do you feel safe at home, work and/or school? yes, per patient  Patient denies 2 or more falls or an unexplained fall with injury in the last year, further testing not required     OBJECTIVE     Outcome Measures:   Patient-Specific Activity Scoring Scheme  Provide up to 3 activities you are currently having difficulty completing and want to improve with therapy:  Activity Score   Date 10/26/2020   1. Able to participate in intercourse without pain.  0   2. Able to carry her children without pelvic pressure 5   3.     Average Score 2.5   Each activity is scored: 0=unable to perform activity to 10=able to perform activity at the same level as before injury to problem  Total score = sum of the activity  scores/number of activities  Minimum detectable change (90%CI) for average score = 2 points  Minimum detectable change (90%CI) for single activity score = 3 points      Initial Treatment   Initial evaluation completed.    Therapeutic Activity:  Instructed A&P of pelvic floor and it's role in lumbopelvic stability, organ support, bowel and bladder control, and sexual function.   Instructed in effects of diet and fluid intake on bowel and bladder function.   Instructed role of PT in treatment of pelvic floor dysfunction.   Patient educated on nervous system as body's alarm system and role of nociception to warn of danger. Topics of peripheral nerve sensitization, hyperalgesia, and allodynia used with metaphors for deep learning.          Skilled input: as detailed above    Initial Home Program:  * above=instructed home program        Suggestions for next session as indicated: progress per plan of care, complete internal portion of exam and begin treatment as indicated    ASSESSMENT   27 year old female patient has signs and symptoms consistent with pelvic floor dysfunction that has reported functional limitations listed above.     Patient will benefit from skilled therapy and Rehabilitative potential is good based on assessment above  Predicted patient presentation: Moderate (evolving) - Patient comorbidities and complexities, as defined above, may have varying impact on steady progress for prescribed plan of care.    PLAN   Goals: To be obtained by end of this plan of care:  1. Patient independent with modified and progressed home exercise program.  2. See outcome measure for goals  3. Patient Specific Functional Scale (PSFS) will improve an average score of 1.5/10 to 7 (minimal detectable change (90%CI) for average score is 2 points, for single activity score is 3 points)    The following skilled interventions to be implemented to achieve above:  Activities of Daily Living/Self Care (99651)  Manual Therapy  (40195)  Neuromuscular Re-Education (58617)  Therapeutic Activity (63351)  Therapeutic Exercise (59540)  Electrical Stimulation (22365//06950)     Frequency/Duration: 1 times per week for 18 weeks with tapering as the patient progresses for an estimated total of 15 visits    patient involved in and agreed to plan of care and goals.   Attendance policy provided at time of evaluation.     Patient Education:   Who will be receiving education: patient  Are they ready to learn: yes  Preferred learning style: written, verbal, demonstration  Barriers to learning: no barriers apparent at this time   Result of initial outlined education: Verbalizes understanding, Demonstrates understanding and Needs reinforcement    Procedures and total treatment time documented in Time Entry flowsheet.

## 2024-12-04 ENCOUNTER — TELEPHONE (OUTPATIENT)
Dept: INTERNAL MEDICINE | Facility: CLINIC | Age: 84
End: 2024-12-04
Payer: MEDICARE

## 2024-12-04 LAB
ALBUMIN SERPL-MCNC: 2.9 G/DL (ref 3.4–4.8)
ALBUMIN/GLOB SERPL: 0.8 RATIO (ref 1.1–2)
ALP SERPL-CCNC: 89 UNIT/L (ref 40–150)
ALT SERPL-CCNC: 100 UNIT/L (ref 0–55)
ANION GAP SERPL CALC-SCNC: 13 MEQ/L
AST SERPL-CCNC: 76 UNIT/L (ref 5–34)
BILIRUB SERPL-MCNC: 1 MG/DL
BNP BLD-MCNC: 2472.8 PG/ML
BUN SERPL-MCNC: 59.4 MG/DL (ref 8.4–25.7)
CALCIUM SERPL-MCNC: 8.4 MG/DL (ref 8.8–10)
CHLORIDE SERPL-SCNC: 111 MMOL/L (ref 98–107)
CO2 SERPL-SCNC: 16 MMOL/L (ref 23–31)
CREAT SERPL-MCNC: 2.46 MG/DL (ref 0.72–1.25)
CREAT/UREA NIT SERPL: 24
D DIMER PPP IA.FEU-MCNC: 10.89 UG/ML FEU (ref 0–0.5)
GFR SERPLBLD CREATININE-BSD FMLA CKD-EPI: 25 ML/MIN/1.73/M2
GLOBULIN SER-MCNC: 3.6 GM/DL (ref 2.4–3.5)
GLUCOSE SERPL-MCNC: 195 MG/DL (ref 82–115)
POCT GLUCOSE: 143 MG/DL (ref 70–110)
POCT GLUCOSE: 208 MG/DL (ref 70–110)
POTASSIUM SERPL-SCNC: 4 MMOL/L (ref 3.5–5.1)
PROT SERPL-MCNC: 6.5 GM/DL (ref 5.8–7.6)
SODIUM SERPL-SCNC: 140 MMOL/L (ref 136–145)

## 2024-12-04 PROCEDURE — 83880 ASSAY OF NATRIURETIC PEPTIDE: CPT | Performed by: INTERNAL MEDICINE

## 2024-12-04 PROCEDURE — 25000003 PHARM REV CODE 250

## 2024-12-04 PROCEDURE — 80053 COMPREHEN METABOLIC PANEL: CPT

## 2024-12-04 PROCEDURE — 63600175 PHARM REV CODE 636 W HCPCS

## 2024-12-04 PROCEDURE — 99900035 HC TECH TIME PER 15 MIN (STAT)

## 2024-12-04 PROCEDURE — 21400001 HC TELEMETRY ROOM

## 2024-12-04 PROCEDURE — 63600175 PHARM REV CODE 636 W HCPCS: Performed by: INTERNAL MEDICINE

## 2024-12-04 PROCEDURE — 94799 UNLISTED PULMONARY SVC/PX: CPT

## 2024-12-04 PROCEDURE — 36415 COLL VENOUS BLD VENIPUNCTURE: CPT | Performed by: INTERNAL MEDICINE

## 2024-12-04 PROCEDURE — 99233 SBSQ HOSP IP/OBS HIGH 50: CPT | Mod: ,,, | Performed by: INTERNAL MEDICINE

## 2024-12-04 PROCEDURE — 97530 THERAPEUTIC ACTIVITIES: CPT | Mod: CQ

## 2024-12-04 PROCEDURE — 36415 COLL VENOUS BLD VENIPUNCTURE: CPT

## 2024-12-04 PROCEDURE — 97535 SELF CARE MNGMENT TRAINING: CPT

## 2024-12-04 PROCEDURE — 25000003 PHARM REV CODE 250: Performed by: INTERNAL MEDICINE

## 2024-12-04 PROCEDURE — 85379 FIBRIN DEGRADATION QUANT: CPT | Performed by: INTERNAL MEDICINE

## 2024-12-04 RX ORDER — METOPROLOL SUCCINATE 50 MG/1
50 TABLET, EXTENDED RELEASE ORAL DAILY
Status: DISCONTINUED | OUTPATIENT
Start: 2024-12-05 | End: 2024-12-05 | Stop reason: HOSPADM

## 2024-12-04 RX ORDER — METOPROLOL SUCCINATE 25 MG/1
25 TABLET, EXTENDED RELEASE ORAL ONCE
Status: COMPLETED | OUTPATIENT
Start: 2024-12-04 | End: 2024-12-04

## 2024-12-04 RX ORDER — FUROSEMIDE 10 MG/ML
40 INJECTION INTRAMUSCULAR; INTRAVENOUS ONCE
Status: COMPLETED | OUTPATIENT
Start: 2024-12-04 | End: 2024-12-04

## 2024-12-04 RX ADMIN — INSULIN ASPART 4 UNITS: 100 INJECTION, SOLUTION INTRAVENOUS; SUBCUTANEOUS at 06:12

## 2024-12-04 RX ADMIN — ISOSORBIDE MONONITRATE 30 MG: 30 TABLET, EXTENDED RELEASE ORAL at 08:12

## 2024-12-04 RX ADMIN — MUPIROCIN: 20 OINTMENT TOPICAL at 08:12

## 2024-12-04 RX ADMIN — LABETALOL HYDROCHLORIDE 10 MG: 5 INJECTION, SOLUTION INTRAVENOUS at 12:12

## 2024-12-04 RX ADMIN — INSULIN GLARGINE 8 UNITS: 100 INJECTION, SOLUTION SUBCUTANEOUS at 10:12

## 2024-12-04 RX ADMIN — DOCUSATE SODIUM 100 MG: 100 CAPSULE, LIQUID FILLED ORAL at 08:12

## 2024-12-04 RX ADMIN — MICONAZOLE NITRATE: 20 POWDER TOPICAL at 10:12

## 2024-12-04 RX ADMIN — Medication: at 10:12

## 2024-12-04 RX ADMIN — MUPIROCIN: 20 OINTMENT TOPICAL at 10:12

## 2024-12-04 RX ADMIN — FUROSEMIDE 40 MG: 10 INJECTION, SOLUTION INTRAMUSCULAR; INTRAVENOUS at 06:12

## 2024-12-04 RX ADMIN — OXYCODONE HYDROCHLORIDE 5 MG: 5 TABLET ORAL at 01:12

## 2024-12-04 RX ADMIN — ATORVASTATIN CALCIUM 80 MG: 40 TABLET, FILM COATED ORAL at 08:12

## 2024-12-04 RX ADMIN — EZETIMIBE 10 MG: 10 TABLET ORAL at 10:12

## 2024-12-04 RX ADMIN — METOPROLOL SUCCINATE 25 MG: 25 TABLET, EXTENDED RELEASE ORAL at 08:12

## 2024-12-04 RX ADMIN — METOPROLOL SUCCINATE 25 MG: 25 TABLET, EXTENDED RELEASE ORAL at 01:12

## 2024-12-04 RX ADMIN — MICONAZOLE NITRATE: 20 POWDER TOPICAL at 08:12

## 2024-12-04 RX ADMIN — OXYCODONE HYDROCHLORIDE 5 MG: 5 TABLET ORAL at 08:12

## 2024-12-04 RX ADMIN — LEVETIRACETAM 500 MG: 500 TABLET, FILM COATED ORAL at 08:12

## 2024-12-04 RX ADMIN — OXYCODONE HYDROCHLORIDE 5 MG: 5 TABLET ORAL at 07:12

## 2024-12-04 RX ADMIN — Medication: at 08:12

## 2024-12-04 NOTE — PLAN OF CARE
Pt is non 10 C rm 1000. No meds over night for behavior. Has rollbelt in use. Nursing updates me that they do have mittens at time when he will not leave the IV tubing alone.  Debi with TCU is watching his labs WBC, BUN/Creat.

## 2024-12-04 NOTE — ASSESSMENT & PLAN NOTE
BUN is rising along with Cr.  I had held the lasix, but he is having what looks to be some resp distress this am.  Will get cxr and consider a dose of lasix iv to see if that helps.  The other consideration would be a PE.  But anticoagulation would be contraindicated at this time.  And he is a DNR.  Will monitor.

## 2024-12-04 NOTE — SUBJECTIVE & OBJECTIVE
Interval History: Patient is without complaints this morning, but he looks like he is in some mild distress --tachypneic.  Denies pain, sob, chest pain.    Review of Systems  Objective:     Vital Signs (Most Recent):  Temp: 97.8 °F (36.6 °C) (12/04/24 1059)  Pulse: (!) 113 (12/04/24 1129)  Resp: 20 (12/04/24 0940)  BP: (!) 141/78 (12/04/24 1129)  SpO2: 100 % (12/04/24 1129) Vital Signs (24h Range):  Temp:  [97.8 °F (36.6 °C)-99.7 °F (37.6 °C)] 97.8 °F (36.6 °C)  Pulse:  [] 113  Resp:  [12-27] 20  SpO2:  [87 %-100 %] 100 %  BP: (102-158)/() 141/78     Weight: 95.3 kg (210 lb)  Body mass index is 31.93 kg/m².    Intake/Output Summary (Last 24 hours) at 12/4/2024 1200  Last data filed at 12/4/2024 0600  Gross per 24 hour   Intake 1100 ml   Output 350 ml   Net 750 ml         Physical Exam  Constitutional:       General: He is in acute distress (mild).      Appearance: He is obese.      Comments: Sl fidgety in the bed, tachypneic     Cardiovascular:      Rate and Rhythm: Regular rhythm. Tachycardia present.      Heart sounds: Murmur heard.   Pulmonary:      Breath sounds: Normal breath sounds.   Abdominal:      General: Abdomen is flat.      Palpations: Abdomen is soft.   Musculoskeletal:      Right lower leg: No edema.      Left lower leg: No edema.   Skin:     General: Skin is warm and dry.   Neurological:      Comments: Follows commands, but not very engaged in the conversation today.  Moving all extremities.             Significant Labs: All pertinent labs within the past 24 hours have been reviewed.  Recent Lab Results  (Last 5 results in the past 24 hours)        12/04/24  1126   12/04/24  0607   12/04/24  0416   12/03/24 2055   12/03/24  1718        Albumin/Globulin Ratio     0.8           Albumin     2.9           ALP     89           ALT     100           Anion Gap     13.0           AST     76           BILIRUBIN TOTAL     1.0           BUN     59.4           BUN/CREAT RATIO     24            Calcium     8.4           Chloride     111           CO2     16           Creatinine     2.46           eGFR     25           Globulin, Total     3.6           Glucose     195           POCT Glucose 143   208     190   189       Potassium     4.0           PROTEIN TOTAL     6.5           Sodium     140                                  Significant Imaging: I have reviewed all pertinent imaging results/findings within the past 24 hours.

## 2024-12-04 NOTE — ASSESSMENT & PLAN NOTE
Patient's hemorrhage is due to trauma/laceration, patient does not have a propensity for bleeding.. Patients most recent Hgb, Hct, platelets, and INR are listed below.  Recent Labs     12/02/24  0346 12/03/24  0322   HGB 11.0* 10.4*   HCT 34.8* 32.7*    198       Plan  Family elected for no surgical intervention.  Plan is for rehab and eventually hospice.

## 2024-12-04 NOTE — PROGRESS NOTES
[facility] Hospital Medicine  Progress Note    Patient Name: Damon Moran  MRN: [id number]  Patient Class: IP- Inpatient   Admission Date: 11/30/2024  Length of Stay: 4 days  Attending Physician: Renuka Lowery MD  Primary Care Provider: Renuka Lowery MD        Subjective     Principal Problem:Traumatic intracranial hemorrhage without loss of consciousness  Acute Condition: guarded        HPI:  No notes on file    Overview/Hospital Course:  No notes on file    Interval History: Patient is without complaints this morning, but he looks like he is in some mild distress --tachypneic.  Denies pain, sob, chest pain.    Review of Systems  Objective:     Vital Signs (Most Recent):  Temp: 97.8 °F (36.6 °C) (12/04/24 1059)  Pulse: (!) 113 (12/04/24 1129)  Resp: 20 (12/04/24 0940)  BP: (!) 141/78 (12/04/24 1129)  SpO2: 100 % (12/04/24 1129) Vital Signs (24h Range):  Temp:  [97.8 °F (36.6 °C)-99.7 °F (37.6 °C)] 97.8 °F (36.6 °C)  Pulse:  [] 113  Resp:  [12-27] 20  SpO2:  [87 %-100 %] 100 %  BP: (102-158)/() 141/78     Weight: 95.3 kg (210 lb)  Body mass index is 31.93 kg/m².    Intake/Output Summary (Last 24 hours) at 12/4/2024 1200  Last data filed at 12/4/2024 0600  Gross per 24 hour   Intake 1100 ml   Output 350 ml   Net 750 ml         Physical Exam  Constitutional:       General: He is in acute distress (mild).      Appearance: He is obese.      Comments: Sl fidgety in the bed, tachypneic     Cardiovascular:      Rate and Rhythm: Regular rhythm. Tachycardia present.      Heart sounds: Murmur heard.   Pulmonary:      Breath sounds: Normal breath sounds.   Abdominal:      General: Abdomen is flat.      Palpations: Abdomen is soft.   Musculoskeletal:      Right lower leg: No edema.      Left lower leg: No edema.   Skin:     General: Skin is warm and dry.   Neurological:      Comments: Follows commands, but not very engaged in the conversation today.  Moving all extremities.              Significant Labs: All pertinent labs within the past 24 hours have been reviewed.  Recent Lab Results  (Last 5 results in the past 24 hours)        12/04/24  1126   12/04/24  0607   12/04/24  0416   12/03/24  2055   12/03/24  1718        Albumin/Globulin Ratio     0.8           Albumin     2.9           ALP     89           ALT     100           Anion Gap     13.0           AST     76           BILIRUBIN TOTAL     1.0           BUN     59.4           BUN/CREAT RATIO     24           Calcium     8.4           Chloride     111           CO2     16           Creatinine     2.46           eGFR     25           Globulin, Total     3.6           Glucose     195           POCT Glucose 143   208     190   189       Potassium     4.0           PROTEIN TOTAL     6.5           Sodium     140                                  Significant Imaging: I have reviewed all pertinent imaging results/findings within the past 24 hours.    Assessment and Plan     * Traumatic intracranial hemorrhage without loss of consciousness  Patient's hemorrhage is due to trauma/laceration, patient does not have a propensity for bleeding.. Patients most recent Hgb, Hct, platelets, and INR are listed below.  Recent Labs     12/02/24  0346 12/03/24  0322   HGB 11.0* 10.4*   HCT 34.8* 32.7*    198       Plan  Family elected for no surgical intervention.  Plan is for rehab and eventually hospice.    Dysphagia  He passed his swallow study.  And he is eating well.      NSTEMI (non-ST elevated myocardial infarction)  No cardiac intervention planned.  Family prefers palliative care.  Continue supportive care.  He is asymptomatic.  Resume Imdur.      Closed TBI (traumatic brain injury)  He has SDH and SAH.  Family elected for no surgical intervention and hospice care.  PT/OT following patient.  Plan is for rehab at SNF to build up his strength before being admitted to hospice.  Continue supportive care.  He is on keppra for seizure  prophylaxis.    Chronic diastolic heart failure  BUN is rising along with Cr.  I had held the lasix, but he is having what looks to be some resp distress this am.  Will get cxr and consider a dose of lasix iv to see if that helps.  The other consideration would be a PE.  But anticoagulation would be contraindicated at this time.  And he is a DNR.  Will monitor.    Essential (primary) hypertension  Will bump up the toprol dose.    Type 2 diabetes mellitus with stage 3a chronic kidney disease, without long-term current use of insulin  CBG is a little better.  Continue ssi and lantus.      VTE Risk Mitigation (From admission, onward)           Ordered     IP VTE HIGH RISK PATIENT  Once         11/30/24 1712     Reason for No Pharmacological VTE Prophylaxis  Once        Comments: Traumatic Injuries   Question:  Reasons:  Answer:  Active Bleeding  Comment:  SDH    11/30/24 1712     Place sequential compression device  Until discontinued         11/30/24 1712                    Discharge Planning   KURTIS:      Code Status: DNR   Medical Readiness for Discharge Date:   Discharge Plan A: Home, Hospice/home                Please place Justification for DME        Renuka Lowery MD  Department of Hospital Medicine   [facility]

## 2024-12-04 NOTE — PT/OT/SLP PROGRESS
Occupational Therapy   Treatment    Name: Damon Moran  MRN: [id number]  Admitting Diagnosis:  Traumatic intracranial hemorrhage without loss of consciousness       Recommendations:     Recommended therapy intensity at discharge: Moderate Intensity Therapy   Discharge Equipment Recommendations:  wheelchair, walker, rolling  Barriers to discharge:  Decreased caregiver support (ongoing medical needs, severity of deficits)    Assessment:     Damon Moran is a 84 y.o. male with a medical diagnosis of fall off of rollator resulting in: SDH, SAH, NSTEMI, elevated troponin, occipital bone fx. Performance deficits affecting function are weakness, impaired self care skills, impaired functional mobility, gait instability, impaired balance. Patient with decline in functional status noted when compared to eval. Patient with R lateral lean, LUE weakness, and diaphoretic. Patient required max Ax2 for everything and max A seated EOB. Nursing alerted of all.    Rehab Prognosis:  Fair; patient would benefit from acute skilled OT services to address these deficits and reach maximum level of function.       Plan:     Patient to be seen 5 x/week to address the above listed problems via self-care/home management, therapeutic activities, therapeutic exercises  Plan of Care Expires: 01/02/25  Plan of Care Reviewed with: patient, daughter    Subjective     Pain/Comfort:  Pain Rating 1: 0/10    Objective:     Communicated with: NSG prior to session.  Patient found HOB elevated with blood pressure cuff, PureWick, peripheral IV, pulse ox (continuous), telemetry upon OT entry to room.    General Precautions: Standard, fall (SBP <140)    Orthopedic Precautions:N/A  Braces: N/A  Respiratory Status: Room air  Vital Signs: >93% however labored breathing, 118-139 bpm  Prior to mobility - 146/95  Sitting EOB - 141/111     Occupational Performance:     Bed Mobility:    Patient completed Supine to Sit with maximal assistance and 2 persons   Sitting  EOB with max A, R lateral lean/push    Functional Mobility/Transfers:  Patient completed Sit <> Stand Transfer with maximal assistance and of 2 persons  with  hand-held assist   Patient completed Bed <> Chair Transfer using Squat Pivot technique with maximal assistance and of 2 persons with hand-held assist    Activities of Daily Living:  Lower Body Dressing: total assistance to don socks      Therapeutic Positioning    OT interventions performed during the course of today's session in an effort to prevent and/or reduce acquired pressure injuries:   Education was provided on benefits of and recommendations for therapeutic positioning  Therapeutic positioning was provided at the conclusion of session to offload all bony prominences for the prevention and/or reduction of pressure injuries      [facility] 6 Click ADL: 13    Patient Education:  Patient provided with verbal education education regarding OT role/goals/POC, fall prevention, safety awareness, Discharge/DME recommendations, and pressure ulcer prevention.  Understanding was verbalized, however additional teaching warranted.      Patient left up in chair with all lines intact, call button in reach, chair alarm on, and NSG notified. Martina frias    GOALS:   Multidisciplinary Problems       Occupational Therapy Goals          Problem: Occupational Therapy    Goal Priority Disciplines Outcome Interventions   Occupational Therapy Goal     OT, PT/OT Progressing    Description: goals to be met by 1/2/25    Pt will complete grooming standing at sink with LRAD with SBA.  Pt will complete UB dressing with SBA.  Pt will complete LB dressing with SBA using LRAD.  Pt will complete toileting with SBA using LRAD.  Pt will complete functional mobility to/from toilet and toilet transfer with SBA using LRAD.                        Time Tracking:     OT Date of Treatment:    OT Start Time: 0829  OT Stop Time: 0852  OT Total Time (min): 23 min    Billable Minutes:Self Care/Home  Management 2    OT/ALBA: OT     Number of ALBA visits since last OT visit: 2    12/4/2024

## 2024-12-04 NOTE — PT/OT/SLP PROGRESS
Physical Therapy Treatment    Patient Name:  Damon Moran   MRN:  [id number]    Recommendations:     Discharge therapy intensity: Moderate Intensity Therapy   Discharge Equipment Recommendations: walker, rolling, wheelchair  Barriers to discharge: Impaired mobility, Ongoing medical needs, and placement    Assessment:     Damon Moran is a 84 y.o. male admitted with a medical diagnosis of fall off of rollator resulting in: SDH, SAH, NSTEMI, elevated troponin, occipital bone fx .  He presents with the following impairments/functional limitations: weakness, impaired endurance, impaired self care skills, impaired functional mobility, gait instability, impaired balance, impaired cognition, decreased safety awareness, impaired cardiopulmonary response to activity .     Pt isreal tx fairly with heavy breathing upon arrival, R side weakness noted while EOB, gait training held 2/2 weakness and safety awareness.      Rehab Prognosis: Good; patient would benefit from acute skilled PT services to address these deficits and reach maximum level of function.    Recent Surgery: * No surgery found *      Plan:     During this hospitalization, patient would benefit from acute PT services 5 x/week to address the identified rehab impairments via gait training, therapeutic activities, therapeutic exercises, neuromuscular re-education and progress toward the following goals:    Plan of Care Expires:  01/03/25    Subjective     Chief Complaint: none  Patient/Family Comments/goals:   Pain/Comfort:         Objective:     Communicated with RN prior to session.  Patient found HOB elevated with blood pressure cuff, PureWick, peripheral IV, pulse ox (continuous), telemetry upon PT entry to room.     General Precautions: Standard, fall (SBP<140)  Orthopedic Precautions: N/A  Braces: N/A  Respiratory Status: Room air  Skin Integrity: Visible skin intact      Functional Mobility:  Bed Mobility:     Rolling Left:  maximal assistance and of 2  persons  Rolling Right: maximal assistance and of 2 persons  Scooting: maximal assistance  Supine to Sit: maximal assistance and of 2 persons  Transfers:     Bed to Chair: maximal assistance and of 2 persons with  no AD  using  Squat Pivot  Balance: sitting EOB maxA with R lat lean    Therapeutic Activities/Exercises:  Pt sat EOB maxA with R Lat lean and post pushing noted. Unsafe to stand pt at this time. Performed maxAx2 squat from Eob>recliner chair.  Performed LAQ 5x with cues to stay on task.    Education:  Patient provided with verbal education education regarding PT role/goals/POC, fall prevention, and safety awareness.  Additional teaching is warranted.     Patient left up in chair with all lines intact, call button in reach, and RN notified    GOALS:   Multidisciplinary Problems       Physical Therapy Goals          Problem: Physical Therapy    Goal Priority Disciplines Outcome Interventions   Physical Therapy Goal     PT, PT/OT Progressing    Description: Goals to be met by: 2025     Patient will increase functional independence with mobility by performin. Supine to sit with Stand-by Assistance  2. Sit to stand transfer with Stand-by Assistance  3. Bed to chair transfer with Stand-by Assistance using Rolling Walker  4. Gait  x 50 feet with Stand-by Assistance using Rolling Walker.                          Time Tracking:     PT Received On: 24  PT Start Time: 0944     PT Stop Time: 1012  PT Total Time (min): 28 min     Billable Minutes: Therapeutic Activity 28    Treatment Type: Treatment  PT/PTA: PTA     Number of PTA visits since last PT visit: 2024

## 2024-12-04 NOTE — NURSING TRANSFER
Nursing Transfer Note      12/4/2024   9:53 AM    Nurse giving handoff:Joaquina  Nurse receiving handoff:Maricarmen    Reason patient is being transferred: Downgrade from ICU    Transfer To: Rm 1000    Transfer via wheelchair    Transported by RN    Transfer Vital Signs:  Blood Pressure:131/85  Heart Rate:111  O2:98  Temperature:99.7  Respirations:20    Medicines sent: Desitin Ointment and Miconidazole Top Powder    Patient belongings transferred with patient: Yes    Chart send with patient: Yes    Notified: daughter    Patient reassessed at: 0940 12/4/2024    Upon arrival to floor: patient oriented to room, call bell in reach, and bed in lowest position

## 2024-12-05 VITALS
DIASTOLIC BLOOD PRESSURE: 85 MMHG | SYSTOLIC BLOOD PRESSURE: 123 MMHG | WEIGHT: 210 LBS | HEART RATE: 109 BPM | OXYGEN SATURATION: 97 % | HEIGHT: 68 IN | BODY MASS INDEX: 31.83 KG/M2 | RESPIRATION RATE: 22 BRPM | TEMPERATURE: 99 F

## 2024-12-05 PROBLEM — Z51.5 COMFORT MEASURES ONLY STATUS: Status: ACTIVE | Noted: 2024-12-05

## 2024-12-05 LAB
ALBUMIN SERPL-MCNC: 2.9 G/DL (ref 3.4–4.8)
ALBUMIN/GLOB SERPL: 0.8 RATIO (ref 1.1–2)
ALP SERPL-CCNC: 88 UNIT/L (ref 40–150)
ALT SERPL-CCNC: 217 UNIT/L (ref 0–55)
ANION GAP SERPL CALC-SCNC: 14 MEQ/L
AST SERPL-CCNC: 140 UNIT/L (ref 5–34)
BASOPHILS # BLD AUTO: 0.02 X10(3)/MCL
BASOPHILS NFR BLD AUTO: 0.2 %
BILIRUB SERPL-MCNC: 0.9 MG/DL
BUN SERPL-MCNC: 58.5 MG/DL (ref 8.4–25.7)
CALCIUM SERPL-MCNC: 8.4 MG/DL (ref 8.8–10)
CHLORIDE SERPL-SCNC: 108 MMOL/L (ref 98–107)
CO2 SERPL-SCNC: 16 MMOL/L (ref 23–31)
CREAT SERPL-MCNC: 2.49 MG/DL (ref 0.72–1.25)
CREAT/UREA NIT SERPL: 23
EOSINOPHIL # BLD AUTO: 0.02 X10(3)/MCL (ref 0–0.9)
EOSINOPHIL NFR BLD AUTO: 0.2 %
ERYTHROCYTE [DISTWIDTH] IN BLOOD BY AUTOMATED COUNT: 15 % (ref 11.5–17)
GFR SERPLBLD CREATININE-BSD FMLA CKD-EPI: 25 ML/MIN/1.73/M2
GLOBULIN SER-MCNC: 3.7 GM/DL (ref 2.4–3.5)
GLUCOSE SERPL-MCNC: 175 MG/DL (ref 82–115)
HCT VFR BLD AUTO: 33 % (ref 42–52)
HGB BLD-MCNC: 10.8 G/DL (ref 14–18)
IMM GRANULOCYTES # BLD AUTO: 0.14 X10(3)/MCL (ref 0–0.04)
IMM GRANULOCYTES NFR BLD AUTO: 1.4 %
LYMPHOCYTES # BLD AUTO: 1.02 X10(3)/MCL (ref 0.6–4.6)
LYMPHOCYTES NFR BLD AUTO: 9.8 %
MCH RBC QN AUTO: 30.7 PG (ref 27–31)
MCHC RBC AUTO-ENTMCNC: 32.7 G/DL (ref 33–36)
MCV RBC AUTO: 93.8 FL (ref 80–94)
MONOCYTES # BLD AUTO: 1.04 X10(3)/MCL (ref 0.1–1.3)
MONOCYTES NFR BLD AUTO: 10 %
NEUTROPHILS # BLD AUTO: 8.12 X10(3)/MCL (ref 2.1–9.2)
NEUTROPHILS NFR BLD AUTO: 78.4 %
NRBC BLD AUTO-RTO: 0 %
PLATELET # BLD AUTO: 196 X10(3)/MCL (ref 130–400)
PMV BLD AUTO: 10.1 FL (ref 7.4–10.4)
POCT GLUCOSE: 176 MG/DL (ref 70–110)
POCT GLUCOSE: 207 MG/DL (ref 70–110)
POTASSIUM SERPL-SCNC: 3.7 MMOL/L (ref 3.5–5.1)
PROT SERPL-MCNC: 6.6 GM/DL (ref 5.8–7.6)
RBC # BLD AUTO: 3.52 X10(6)/MCL (ref 4.7–6.1)
SODIUM SERPL-SCNC: 138 MMOL/L (ref 136–145)
WBC # BLD AUTO: 10.36 X10(3)/MCL (ref 4.5–11.5)

## 2024-12-05 PROCEDURE — 85025 COMPLETE CBC W/AUTO DIFF WBC: CPT | Performed by: INTERNAL MEDICINE

## 2024-12-05 PROCEDURE — 25000003 PHARM REV CODE 250

## 2024-12-05 PROCEDURE — 94760 N-INVAS EAR/PLS OXIMETRY 1: CPT

## 2024-12-05 PROCEDURE — 99900031 HC PATIENT EDUCATION (STAT)

## 2024-12-05 PROCEDURE — 99239 HOSP IP/OBS DSCHRG MGMT >30: CPT | Mod: ,,, | Performed by: INTERNAL MEDICINE

## 2024-12-05 PROCEDURE — 99900035 HC TECH TIME PER 15 MIN (STAT)

## 2024-12-05 PROCEDURE — 27000221 HC OXYGEN, UP TO 24 HOURS

## 2024-12-05 PROCEDURE — 63600175 PHARM REV CODE 636 W HCPCS

## 2024-12-05 PROCEDURE — 36415 COLL VENOUS BLD VENIPUNCTURE: CPT | Performed by: INTERNAL MEDICINE

## 2024-12-05 PROCEDURE — 94799 UNLISTED PULMONARY SVC/PX: CPT

## 2024-12-05 PROCEDURE — 63600175 PHARM REV CODE 636 W HCPCS: Performed by: NURSE PRACTITIONER

## 2024-12-05 PROCEDURE — 25000003 PHARM REV CODE 250: Performed by: INTERNAL MEDICINE

## 2024-12-05 PROCEDURE — 80053 COMPREHEN METABOLIC PANEL: CPT

## 2024-12-05 RX ORDER — LEVETIRACETAM 500 MG/1
500 TABLET ORAL 2 TIMES DAILY
Qty: 60 TABLET | Refills: 11 | Status: SHIPPED | OUTPATIENT
Start: 2024-12-05 | End: 2025-12-05

## 2024-12-05 RX ORDER — POLYETHYLENE GLYCOL 3350 17 G/17G
17 POWDER, FOR SOLUTION ORAL 2 TIMES DAILY
Qty: 1 EACH | Refills: 0 | Status: SHIPPED | OUTPATIENT
Start: 2024-12-05

## 2024-12-05 RX ORDER — INSULIN GLARGINE 100 [IU]/ML
8 INJECTION, SOLUTION SUBCUTANEOUS NIGHTLY
Qty: 2.4 ML | Refills: 11 | Status: SHIPPED | OUTPATIENT
Start: 2024-12-05 | End: 2025-12-05

## 2024-12-05 RX ORDER — MORPHINE SULFATE 4 MG/ML
2 INJECTION, SOLUTION INTRAMUSCULAR; INTRAVENOUS
Status: DISCONTINUED | OUTPATIENT
Start: 2024-12-05 | End: 2024-12-05 | Stop reason: HOSPADM

## 2024-12-05 RX ADMIN — OXYCODONE HYDROCHLORIDE 5 MG: 5 TABLET ORAL at 10:12

## 2024-12-05 RX ADMIN — DOCUSATE SODIUM 100 MG: 100 CAPSULE, LIQUID FILLED ORAL at 08:12

## 2024-12-05 RX ADMIN — ATORVASTATIN CALCIUM 80 MG: 40 TABLET, FILM COATED ORAL at 08:12

## 2024-12-05 RX ADMIN — LEVETIRACETAM 500 MG: 500 TABLET, FILM COATED ORAL at 08:12

## 2024-12-05 RX ADMIN — HYDRALAZINE HYDROCHLORIDE 10 MG: 20 INJECTION INTRAMUSCULAR; INTRAVENOUS at 05:12

## 2024-12-05 RX ADMIN — LORAZEPAM 1 MG: 2 INJECTION INTRAMUSCULAR; INTRAVENOUS at 01:12

## 2024-12-05 RX ADMIN — METOPROLOL SUCCINATE 50 MG: 50 TABLET, EXTENDED RELEASE ORAL at 08:12

## 2024-12-05 RX ADMIN — Medication: at 08:12

## 2024-12-05 RX ADMIN — ISOSORBIDE MONONITRATE 30 MG: 30 TABLET, EXTENDED RELEASE ORAL at 08:12

## 2024-12-05 RX ADMIN — MICONAZOLE NITRATE: 20 POWDER TOPICAL at 08:12

## 2024-12-05 RX ADMIN — MUPIROCIN: 20 OINTMENT TOPICAL at 08:12

## 2024-12-05 RX ADMIN — MORPHINE SULFATE 2 MG: 4 INJECTION INTRAVENOUS at 04:12

## 2024-12-05 NOTE — PLAN OF CARE
Problem: Diabetes Comorbidity  Goal: Blood Glucose Level Within Targeted Range  Outcome: Progressing  Intervention: Monitor and Manage Glycemia  Flowsheets (Taken 12/4/2024 2127)  Glycemic Management:   blood glucose monitored   supplemental insulin given     Problem: Skin Injury Risk Increased  Goal: Skin Health and Integrity  Outcome: Progressing  Intervention: Optimize Skin Protection  Flowsheets (Taken 12/4/2024 2127)  Pressure Reduction Techniques:   frequent weight shift encouraged   weight shift assistance provided  Pressure Reduction Devices:   foam padding utilized   positioning supports utilized  Skin Protection:   incontinence pads utilized   silicone foam dressing in place  Activity Management: Rolling - L1  Head of Bed (HOB) Positioning:   HOB at 20-30 degrees   HOB at 30-45 degrees     Problem: Wound  Goal: Optimal Coping  Outcome: Progressing  Intervention: Support Patient and Family Response  Flowsheets (Taken 12/4/2024 2127)  Supportive Measures:   active listening utilized   decision-making supported   relaxation techniques promoted  Family/Support System Care: caregiver stress acknowledged

## 2024-12-05 NOTE — HOSPITAL COURSE
Mr. Moran was admitted to the trauma service in the ICU for close monitoring.  He was evaluated by cardiology for the NSTEMI and by neurosurgery.  Given the patient's age and co-morbidities, the family chose not to intervene and comfort care.  He did seem to stabilize initially -- was following commands, eating, alert.  So initially we decided to send him to rehab for a short time to build up his strength.  But as the days progressed, he became less interactive and had issues with a sinus tachycardia and tachypnea.  He also had issues with his liver enzymes starting to go up.  Given the overall goal of comfort care, the pre-existing CKD, and the recent hemorrhage, further w/u for his decline was deferred.  In further discussion with his daughter and son (via phone), we made the decision to transition straight to hospice care.  He will be discharged to [facility].

## 2024-12-05 NOTE — PLAN OF CARE
updates  to arrange for hospice. Spoke with Daughter at bedside and Jeimy with [facility] is in room now to reassess.     1204  Jeimy with [facility] has updated me that pt is a candidate for inpt hospice prior to returning home. She has their dr approval and can take today . Son and daughter Amrita would like inpt hospice and then work on going home at a later date.   Called Dr Lowery  who will  put in dc order for [facility].   Placed pt in ambulance will call status.  Nursing and SSC aware

## 2024-12-05 NOTE — NURSING
Report given to CASSIDY Johnson, at 1420 at [facility]. Pt taken out of will call and stated transport would be about an hour.

## 2024-12-05 NOTE — PLAN OF CARE
[facility] sent DC information to [facility] via [employer].

## 2024-12-05 NOTE — PT/OT/SLP PROGRESS
Physical Therapy Treatment    Patient Name:  Damon Moran   MRN:  [id number]    Attempted AM treatment in AM but BP out of parameters and in PM Amrita daughter elected hospice and PT to sign off.

## 2024-12-05 NOTE — DISCHARGE SUMMARY
[facility] Hospital Medicine  Discharge Summary      Patient Name: Damon Moran  MRN: [id number]  [facility]: [id number]  Patient Class: IP- Inpatient  Admission Date: 11/30/2024  Hospital Length of Stay: 5 days  Discharge Date and Time:  12/05/2024 1:14 PM  Attending Physician: Renuka Lowery MD   Discharging Provider: Renuka Lowery MD  Primary Care Provider: Renuka Lowery MD    Primary Care Team: Networked reference to record PCT     HPI: Mr. Moran is an 83 yo male with multiple medical co-morbidities includiing CAD, PAD, CKD, DM, HLD, diastolic dysfunction among other diagnoses who presented to the hospital after a fall at home.  Further evaluation revealed SHD and SAH.  He was admitted to the trauma icu for further evaluation and monitoring.  No notes on file    * No surgery found *      Hospital Course:   Mr. Moran was admitted to the trauma service in the ICU for close monitoring.  He was evaluated by cardiology for the NSTEMI and by neurosurgery.  Given the patient's age and co-morbidities, the family chose not to intervene and comfort care.  He did seem to stabilize initially -- was following commands, eating, alert.  So initially we decided to send him to rehab for a short time to build up his strength.  But as the days progressed, he became less interactive and had issues with a sinus tachycardia and tachypnea.  He also had issues with his liver enzymes starting to go up.  Given the overall goal of comfort care, the pre-existing CKD, and the recent hemorrhage, further w/u for his decline was deferred.  In further discussion with his daughter and son (via phone), we made the decision to transition straight to hospice care.  He will be discharged to [facility].     Goals of Care Treatment Preferences:  Code Status: DNR       LaPOST: Yes  What is most important right now is to focus on comfort and QOL .  Accordingly, we have decided that the best plan to meet the patient's goals  includes no further escalation in treatment, enrolling in hospice care, pivot to comfort-focused care.      SDOH Screening:  The patient was screened for utility difficulties, food insecurity, transport difficulties, housing insecurity, and interpersonal safety and there were no concerns identified this admission.     Consults:   Consults (From admission, onward)          Status Ordering Provider     Inpatient consult to Social Work/Case Management  Once        Provider:  (Not yet assigned)    Acknowledged RALPH OTERO     Inpatient consult to Palliative Care  Once        Provider:  Ralph Otero MD    Completed DELMA SUNSHINE     Inpatient consult to Social Work/Case Management  Once        Provider:  (Not yet assigned)    Acknowledged DELMA SUNSHINE     Inpatient consult to Cardiology  Once        Provider:  Branden Watson MD    Completed JACKI MENDEZ     Inpatient consult to Neurosurgery  Once        Provider:  Dotty Sims MD    Completed JACKI MENDEZ            * Traumatic intracranial hemorrhage without loss of consciousness  Patient's hemorrhage is due to trauma/laceration, patient does not have a propensity for bleeding.. Patients most recent Hgb, Hct, platelets, and INR are listed below.  Recent Labs     12/02/24  0346 12/03/24  0322   HGB 11.0* 10.4*   HCT 34.8* 32.7*    198       Plan  Family elected for no surgical intervention.  Plan is for  hospice.    Dysphagia  He passed his swallow study.  And he is eating well.      NSTEMI (non-ST elevated myocardial infarction)  No cardiac intervention planned.  Family prefers palliative care.  Continue supportive care.  He is asymptomatic.  Resume Imdur.      Closed TBI (traumatic brain injury)  He has SDH and SAH.  Family elected for no surgical intervention and hospice care.  PT/OT following patient.  Plan is for hospice.  Continue supportive care.  He is on keppra for seizure prophylaxis.    Chronic diastolic heart failure  I gave him  a dose of iv lasix yesterday.  He normally takes it thrice weekly..    Essential (primary) hypertension  CCM    Type 2 diabetes mellitus with stage 3a chronic kidney disease, without long-term current use of insulin  CBG is a little better.  Continue ssi and lantus.      Final Active Diagnoses:    Diagnosis Date Noted POA    PRINCIPAL PROBLEM:  Traumatic intracranial hemorrhage without loss of consciousness [S06.300A] 12/03/2024 Yes    Closed TBI (traumatic brain injury) [S06.9XAA] 12/02/2024 Yes    NSTEMI (non-ST elevated myocardial infarction) [I21.4] 12/02/2024 Yes    Dysphagia [R13.10] 12/02/2024 Yes    Chronic diastolic heart failure [I50.32] 08/09/2023 Yes    Essential (primary) hypertension [I10] 06/16/2022 Yes    Type 2 diabetes mellitus with stage 3a chronic kidney disease, without long-term current use of insulin [E11.22, N18.31] 06/16/2022 Yes      Problems Resolved During this Admission:       Discharged Condition: poor    Disposition: Hospice/Medical Facility    Follow Up:    Patient Instructions:      Diet diabetic     Activity as tolerated       Significant Diagnostic Studies: Labs: All labs within the past 24 hours have been reviewed    Pending Diagnostic Studies:       None           Medications:  Reconciled Home Medications:      Medication List        START taking these medications      insulin glargine U-100 (Lantus) 100 unit/mL injection  Inject 8 Units into the skin every evening.     levETIRAcetam 500 MG Tab  Commonly known as: KEPPRA  Take 1 tablet (500 mg total) by mouth 2 (two) times daily.     polyethylene glycol 17 gram Pwpk  Commonly known as: GLYCOLAX  Take 17 g by mouth 2 (two) times a day.            CONTINUE taking these medications      furosemide 40 MG tablet  Commonly known as: LASIX  Take 40 mg by mouth every Mon, Wed, Fri.     isosorbide mononitrate 30 MG 24 hr tablet  Commonly known as: IMDUR  Take 15 mg by mouth once daily.     metoprolol succinate 50 MG 24 hr tablet  Commonly  known as: TOPROL-XL  Take 25 mg by mouth once daily.     TRADJENTA 5 mg Tab tablet  Generic drug: linaGLIPtin  Take 1 tablet (5 mg total) by mouth once daily.            STOP taking these medications      allopurinoL 300 MG tablet  Commonly known as: ZYLOPRIM     aspirin 81 MG Chew     atorvastatin 80 MG tablet  Commonly known as: LIPITOR     blood sugar diagnostic Strp     blood-glucose meter kit     docusate sodium 50 MG capsule  Commonly known as: COLACE     empagliflozin 25 mg tablet  Commonly known as: JARDIANCE     ezetimibe 10 mg tablet  Commonly known as: ZETIA     glimepiride 4 MG tablet  Commonly known as: AMARYL     Lactobacillus acidophilus 500 million cell Cap     lancets Misc     lancing device Misc              Indwelling Lines/Drains at time of discharge:   Lines/Drains/Airways       None                   Time spent on the discharge of patient: 35 minutes         Renuka Lowery MD  Department of Hospital Medicine  [facility]

## 2024-12-05 NOTE — PROGRESS NOTES
Advance Care Planning     Palliative Care Follow up assessment completed, patient with noted change in clinical status, increase respirations noted, breaths labored with use of accessory muscles, does not respond to verbal/tactile stimulation. No family at bedside, nurse reports that patient has been accepted to [facility] for inpatient hospice services. Review of MAR spoke to Cece Palliative NP comfort medications  morphine IV and lorazepam IV added to promoted comfort prn dyspnea and pain.     Date: 12/05/2024    Hospice  Patient is accepted and plan to discharge to inpatient hospice services. Comfort focus care order set initiated.

## 2024-12-05 NOTE — PLAN OF CARE
12/05/24 1429   Final Note   Assessment Type Final Discharge Note   Anticipated Discharge Disposition [facility]   Hospital Resources/Appts/Education Provided Post-Acute resouces added to AVS   Post-Acute Status   Post-Acute Authorization Hospice   Hospice Status Set-up Complete/Auth obtained   Discharge Delays None known at this time     Pt to d/c to [facility] for inpt hospice services.     Celeste Benitez LCSW

## 2024-12-06 NOTE — PLAN OF CARE
Problem: Adult Inpatient Plan of Care  Goal: Plan of Care Review  Outcome: Progressing  Goal: Optimal Comfort and Wellbeing  Outcome: Progressing     Problem: Diabetes Comorbidity  Goal: Blood Glucose Level Within Targeted Range  Outcome: Progressing     Problem: Skin Injury Risk Increased  Goal: Skin Health and Integrity  Outcome: Progressing     Problem: Wound  Goal: Optimal Coping  Outcome: Progressing  Goal: Optimal Pain Control and Function  Outcome: Progressing     Problem: Palliative Care  Goal: Enhanced Quality of Life  Outcome: Progressing

## 2024-12-10 ENCOUNTER — TELEPHONE (OUTPATIENT)
Dept: INTERNAL MEDICINE | Facility: CLINIC | Age: 84
End: 2024-12-10
Payer: MEDICARE

## 2024-12-10 NOTE — TELEPHONE ENCOUNTER
----- Message from Chanell sent at 12/10/2024  8:15 AM CST -----  .Type:  Patient Returning Call    Who Called:Amrita  Who Left Message for Patient:daughter  Does the patient know what this is regarding?:Amrita called to inform office that her dad passed  Would the patient rather a call back or a response via [facility]?   Best Call Back Number:[phone]  Additional Information: Amrita called to inform office that her dad passed